# Patient Record
Sex: MALE | Race: WHITE | HISPANIC OR LATINO | Employment: OTHER | URBAN - METROPOLITAN AREA
[De-identification: names, ages, dates, MRNs, and addresses within clinical notes are randomized per-mention and may not be internally consistent; named-entity substitution may affect disease eponyms.]

---

## 2017-01-07 RX ORDER — METFORMIN HYDROCHLORIDE 1000 MG/1
TABLET ORAL
Qty: 180 TABLET | Refills: 2 | Status: SHIPPED | OUTPATIENT
Start: 2017-01-07 | End: 2018-01-16 | Stop reason: SDUPTHER

## 2017-02-01 DIAGNOSIS — Z00.00 ANNUAL PHYSICAL EXAM: ICD-10-CM

## 2017-02-01 DIAGNOSIS — E11.9 TYPE 2 DIABETES MELLITUS WITHOUT COMPLICATION: ICD-10-CM

## 2017-02-01 RX ORDER — LISINOPRIL 2.5 MG/1
TABLET ORAL
Qty: 30 TABLET | Refills: 6 | Status: SHIPPED | OUTPATIENT
Start: 2017-02-01 | End: 2017-10-02 | Stop reason: SDUPTHER

## 2017-06-16 DIAGNOSIS — E11.9 TYPE 2 DIABETES MELLITUS WITHOUT COMPLICATION: ICD-10-CM

## 2017-09-28 ENCOUNTER — TELEPHONE (OUTPATIENT)
Dept: INTERNAL MEDICINE | Facility: CLINIC | Age: 57
End: 2017-09-28

## 2017-09-28 RX ORDER — ATORVASTATIN CALCIUM 10 MG/1
10 TABLET, FILM COATED ORAL NIGHTLY
Qty: 90 TABLET | Refills: 1 | Status: SHIPPED | OUTPATIENT
Start: 2017-09-28 | End: 2018-11-06 | Stop reason: SDUPTHER

## 2017-09-28 RX ORDER — ATORVASTATIN CALCIUM 10 MG/1
10 TABLET, FILM COATED ORAL NIGHTLY
Qty: 30 TABLET | Refills: 1 | Status: SHIPPED | OUTPATIENT
Start: 2017-09-28 | End: 2017-09-28 | Stop reason: SDUPTHER

## 2017-10-02 ENCOUNTER — TELEPHONE (OUTPATIENT)
Dept: INTERNAL MEDICINE | Facility: CLINIC | Age: 57
End: 2017-10-02

## 2017-10-02 DIAGNOSIS — Z00.00 ANNUAL PHYSICAL EXAM: ICD-10-CM

## 2017-10-02 DIAGNOSIS — E11.9 TYPE 2 DIABETES MELLITUS WITHOUT COMPLICATION, UNSPECIFIED LONG TERM INSULIN USE STATUS: ICD-10-CM

## 2017-10-02 RX ORDER — LISINOPRIL 2.5 MG/1
2.5 TABLET ORAL DAILY
Qty: 90 TABLET | Refills: 0 | Status: SHIPPED | OUTPATIENT
Start: 2017-10-02 | End: 2018-01-02 | Stop reason: SDUPTHER

## 2018-01-02 DIAGNOSIS — Z00.00 ANNUAL PHYSICAL EXAM: ICD-10-CM

## 2018-01-02 DIAGNOSIS — E11.9 TYPE 2 DIABETES MELLITUS WITHOUT COMPLICATION, UNSPECIFIED LONG TERM INSULIN USE STATUS: ICD-10-CM

## 2018-01-02 RX ORDER — LISINOPRIL 2.5 MG/1
TABLET ORAL
Qty: 90 TABLET | Refills: 1 | Status: SHIPPED | OUTPATIENT
Start: 2018-01-02 | End: 2018-07-09 | Stop reason: SDUPTHER

## 2018-01-17 RX ORDER — METFORMIN HYDROCHLORIDE 1000 MG/1
TABLET ORAL
Qty: 180 TABLET | Refills: 1 | Status: SHIPPED | OUTPATIENT
Start: 2018-01-17 | End: 2018-07-09 | Stop reason: SDUPTHER

## 2018-01-19 ENCOUNTER — HOSPITAL ENCOUNTER (OUTPATIENT)
Dept: RADIOLOGY | Facility: HOSPITAL | Age: 58
Discharge: HOME | End: 2018-01-19
Attending: INTERNAL MEDICINE
Payer: COMMERCIAL

## 2018-01-19 ENCOUNTER — OFFICE VISIT (OUTPATIENT)
Dept: INTERNAL MEDICINE | Facility: CLINIC | Age: 58
End: 2018-01-19
Payer: COMMERCIAL

## 2018-01-19 VITALS
HEIGHT: 65 IN | SYSTOLIC BLOOD PRESSURE: 129 MMHG | DIASTOLIC BLOOD PRESSURE: 76 MMHG | HEART RATE: 75 BPM | BODY MASS INDEX: 31.19 KG/M2 | WEIGHT: 187.19 LBS | TEMPERATURE: 98 F | RESPIRATION RATE: 18 BRPM

## 2018-01-19 DIAGNOSIS — Z00.00 ANNUAL PHYSICAL EXAM: ICD-10-CM

## 2018-01-19 DIAGNOSIS — M54.12 CERVICAL RADICULOPATHY: ICD-10-CM

## 2018-01-19 DIAGNOSIS — M54.12 CERVICAL RADICULOPATHY: Primary | ICD-10-CM

## 2018-01-19 DIAGNOSIS — R53.83 FATIGUE, UNSPECIFIED TYPE: ICD-10-CM

## 2018-01-19 PROCEDURE — 72040 X-RAY EXAM NECK SPINE 2-3 VW: CPT | Mod: 26,,, | Performed by: RADIOLOGY

## 2018-01-19 PROCEDURE — 72040 X-RAY EXAM NECK SPINE 2-3 VW: CPT | Mod: TC,PO

## 2018-01-19 PROCEDURE — 99999 PR PBB SHADOW E&M-EST. PATIENT-LVL III: CPT | Mod: PBBFAC,,, | Performed by: INTERNAL MEDICINE

## 2018-01-19 PROCEDURE — 99396 PREV VISIT EST AGE 40-64: CPT | Mod: S$GLB,,, | Performed by: INTERNAL MEDICINE

## 2018-01-19 RX ORDER — METHOCARBAMOL 500 MG/1
500 TABLET, FILM COATED ORAL 3 TIMES DAILY PRN
Qty: 30 TABLET | Refills: 0 | Status: SHIPPED | OUTPATIENT
Start: 2018-01-19 | End: 2018-01-29

## 2018-01-19 RX ORDER — LIDOCAINE 50 MG/G
OINTMENT TOPICAL DAILY PRN
Qty: 120 G | Refills: 3 | Status: SHIPPED | OUTPATIENT
Start: 2018-01-19 | End: 2019-09-05

## 2018-01-19 RX ORDER — GLYBURIDE 5 MG/1
TABLET ORAL
COMMUNITY
Start: 2017-12-27 | End: 2019-02-19 | Stop reason: SDUPTHER

## 2018-01-19 RX ORDER — NAPROXEN 500 MG/1
500 TABLET ORAL 2 TIMES DAILY
Qty: 60 TABLET | Refills: 1 | Status: SHIPPED | OUTPATIENT
Start: 2018-01-19 | End: 2018-04-04 | Stop reason: SDUPTHER

## 2018-01-19 NOTE — PROGRESS NOTES
Subjective:       Patient ID: Darrell Hinds Jr. is a 57 y.o. male.    Chief Complaint: Neck Pain (below ear & down to shoulder); Shortness of Breath; and Shoulder Pain (left)    HPI     57-year-old male here for evaluation of neck pain below the ear into the shoulder, shortness of breath, and left shoulder pain.  He reports that for two weeks, he has a pain behind his left ear that goes down the left side of his neck into his upper back.  He felt some numbness of his face and tingling of his left fingers.  He had eaten and felt dizzy the other day.  He felt kind of weak and nauseous.  He feels better this am.  He has had his wife rubbing the pain.  He has tried ibuprofen numbing cream.  It feels like a sharp pain.  The pain is intermittent.  Better after massage.  Better in the am.  He does not feel it when he goes to sleep.  He has had more stress than usual.      Review of Systems    Objective:      Physical Exam   Constitutional: He is oriented to person, place, and time. He appears well-developed and well-nourished.   HENT:   Head: Normocephalic and atraumatic.   Right Ear: Tympanic membrane and ear canal normal.   Left Ear: Tympanic membrane and ear canal normal.   Mouth/Throat: No oropharyngeal exudate.   Eyes: EOM are normal. Pupils are equal, round, and reactive to light. Right eye exhibits no discharge. Left eye exhibits no discharge. No scleral icterus.   Neck: Normal range of motion. Neck supple. No tracheal deviation present. No thyromegaly present.   Cardiovascular: Normal rate, regular rhythm and normal heart sounds.  Exam reveals no gallop and no friction rub.    No murmur heard.  Pulmonary/Chest: Effort normal and breath sounds normal. No respiratory distress. He has no wheezes. He has no rales. He exhibits no tenderness.   Abdominal: Soft. Bowel sounds are normal. He exhibits no distension and no mass. There is no tenderness. There is no rebound and no guarding.   Musculoskeletal: Normal range of  motion. He exhibits no edema or tenderness.   Neurological: He is alert and oriented to person, place, and time.   Skin: Skin is warm and dry. No rash noted. No erythema. No pallor.   Psychiatric: He has a normal mood and affect. His behavior is normal.   Vitals reviewed.      Assessment:       1. Cervical radiculopathy    2. Annual physical exam    3. Fatigue, unspecified type        Plan:       1.  Cervical spine x-ray.  Naproxen 500 mg twice a day.  Lidocaine gel topically remove after 12 hours.  Robaxin 3 times a day as needed.  Advised this can make him drowsy.    2.  Annual - advised to come back for annual exam at his convenience.  Check CBC, CMP, TSH, lipids, A1c, PSA, testosterone.  Urine microalbumin.  3.  Testosterone

## 2018-01-25 ENCOUNTER — TELEPHONE (OUTPATIENT)
Dept: INTERNAL MEDICINE | Facility: CLINIC | Age: 58
End: 2018-01-25

## 2018-01-25 NOTE — TELEPHONE ENCOUNTER
----- Message from Teresa Kulkarni sent at 1/19/2018  4:19 PM CST -----  Contact: Self  Doctor appointment and lab have been scheduled.  Please link lab orders to the lab appointment.  Date of doctor appointment:  02/28  Physical or EP:  physical  Date of lab appointment:  02/21  Comments:

## 2018-04-05 RX ORDER — NAPROXEN 500 MG/1
TABLET ORAL
Qty: 60 TABLET | Refills: 1 | Status: SHIPPED | OUTPATIENT
Start: 2018-04-05 | End: 2018-06-26 | Stop reason: SDUPTHER

## 2018-06-26 RX ORDER — NAPROXEN 500 MG/1
TABLET ORAL
Qty: 60 TABLET | Refills: 1 | Status: SHIPPED | OUTPATIENT
Start: 2018-06-26 | End: 2018-08-19 | Stop reason: SDUPTHER

## 2018-07-09 DIAGNOSIS — E11.9 TYPE 2 DIABETES MELLITUS WITHOUT COMPLICATION: ICD-10-CM

## 2018-07-09 DIAGNOSIS — Z00.00 ANNUAL PHYSICAL EXAM: ICD-10-CM

## 2018-07-09 RX ORDER — LISINOPRIL 2.5 MG/1
TABLET ORAL
Qty: 90 TABLET | Refills: 1 | Status: SHIPPED | OUTPATIENT
Start: 2018-07-09 | End: 2019-01-08 | Stop reason: SDUPTHER

## 2018-07-09 RX ORDER — METFORMIN HYDROCHLORIDE 1000 MG/1
TABLET ORAL
Qty: 180 TABLET | Refills: 1 | Status: SHIPPED | OUTPATIENT
Start: 2018-07-09 | End: 2019-01-08 | Stop reason: SDUPTHER

## 2018-08-20 RX ORDER — NAPROXEN 500 MG/1
TABLET ORAL
Qty: 60 TABLET | Refills: 1 | Status: SHIPPED | OUTPATIENT
Start: 2018-08-20 | End: 2019-09-05

## 2018-09-10 ENCOUNTER — TELEPHONE (OUTPATIENT)
Dept: INTERNAL MEDICINE | Facility: CLINIC | Age: 58
End: 2018-09-10

## 2018-09-10 DIAGNOSIS — Z00.00 ANNUAL PHYSICAL EXAM: Primary | ICD-10-CM

## 2018-09-10 NOTE — TELEPHONE ENCOUNTER
----- Message from Soheila Figueroa sent at 9/10/2018  2:58 PM CDT -----  Doctor appointment and lab have been scheduled.  Please link lab orders to the lab appointment.  Date of doctor appointment:  12/07/18  Physical or EP:  physical  Date of lab appointment:  12/03/17  Comments:

## 2018-11-06 RX ORDER — ATORVASTATIN CALCIUM 10 MG/1
10 TABLET, FILM COATED ORAL NIGHTLY
Qty: 90 TABLET | Refills: 1 | Status: SHIPPED | OUTPATIENT
Start: 2018-11-06 | End: 2019-05-02 | Stop reason: SDUPTHER

## 2018-11-23 NOTE — PROGRESS NOTES
Subjective:       Patient ID: Darrell Hinds Jr. is a 58 y.o. male.    Chief Complaint: Annual Exam    Patient is a 58 y.o.male who presents today for annual    Cholesterol: reviewed  Vaccines: Influenza (yearly); Tetanus (2015) ; Pneumovax (done); Zoster (done)  Eye exam: due now  Colonoscopy: 2015; due in five years    Stopped propecia 2-3 weeks ago.    Diabetes - Patient is on metformin and glyburide. he does not check his BG at home His last eye exam was over one year ago.  He does check their feet on a regular basis.  He does not  have numbness/tingling of his feet.  His last A1c's were   Hemoglobin A1C   Date Value Ref Range Status   12/03/2018 10.6 (H) 4.0 - 5.6 % Final     Comment:     ADA Screening Guidelines:  5.7-6.4%  Consistent with prediabetes  >or=6.5%  Consistent with diabetes  High levels of fetal hemoglobin interfere with the HbA1C  assay. Heterozygous hemoglobin variants (HbS, HgC, etc)do  not significantly interfere with this assay.   However, presence of multiple variants may affect accuracy.     12/05/2016 10.5 (H) 4.5 - 6.2 % Final     Comment:     According to ADA guidelines, hemoglobin A1C <7.0% represents  optimal control in non-pregnant diabetic patients.  Different  metrics may apply to specific populations.   Standards of Medical Care in Diabetes - 2016.  For the purpose of screening for the presence of diabetes:  <5.7%     Consistent with the absence of diabetes  5.7-6.4%  Consistent with increasing risk for diabetes   (prediabetes)  >or=6.5%  Consistent with diabetes  Currently no consensus exists for use of hemoglobin A1C  for diagnosis of diabetes for children.     09/01/2016 11.3 (H) 4.5 - 6.2 % Final     Comment:     According to ADA guidelines, hemoglobin A1C <7.0% represents  optimal control in non-pregnant diabetic patients.  Different  metrics may apply to specific populations.   Standards of Medical Care in Diabetes - 2016.  For the purpose of screening for the presence of  diabetes:  <5.7%     Consistent with the absence of diabetes  5.7-6.4%  Consistent with increasing risk for diabetes   (prediabetes)  >or=6.5%  Consistent with diabetes  Currently no consensus exists for use of hemoglobin A1C  for diagnosis of diabetes for children.     .    Review of Systems   Constitutional: Negative for appetite change, chills, diaphoresis, fatigue and fever.   HENT: Negative for congestion, dental problem, ear discharge, ear pain, hearing loss, postnasal drip, sinus pressure and sore throat.    Eyes: Negative for discharge, redness and itching.   Respiratory: Negative for chest tightness, shortness of breath and wheezing.    Cardiovascular: Negative for chest pain, palpitations and leg swelling.   Gastrointestinal: Negative for abdominal pain, constipation, diarrhea, nausea and vomiting.   Endocrine: Negative for cold intolerance and heat intolerance.   Genitourinary: Negative for difficulty urinating, frequency, hematuria and urgency.   Musculoskeletal: Negative for arthralgias, back pain, gait problem, myalgias and neck pain.   Skin: Negative for color change and rash.   Allergic/Immunologic: Negative for environmental allergies.   Neurological: Negative for dizziness, syncope and headaches.   Hematological: Negative for adenopathy.   Psychiatric/Behavioral: Negative for behavioral problems and sleep disturbance. The patient is not nervous/anxious.        Objective:      Physical Exam   Constitutional: He is oriented to person, place, and time. He appears well-developed and well-nourished. No distress.   HENT:   Head: Normocephalic and atraumatic.   Right Ear: External ear normal.   Left Ear: External ear normal.   Nose: Nose normal.   Mouth/Throat: Oropharynx is clear and moist. No oropharyngeal exudate.   Eyes: Conjunctivae and EOM are normal. Pupils are equal, round, and reactive to light. Right eye exhibits no discharge. Left eye exhibits no discharge. No scleral icterus.   Neck: Normal  range of motion. Neck supple. No JVD present. No tracheal deviation present. No thyromegaly present.   Cardiovascular: Normal rate, regular rhythm, normal heart sounds and intact distal pulses. Exam reveals no gallop and no friction rub.   No murmur heard.  Pulses:       Dorsalis pedis pulses are 2+ on the right side, and 2+ on the left side.        Posterior tibial pulses are 2+ on the right side, and 2+ on the left side.   Pulmonary/Chest: Effort normal and breath sounds normal. No stridor. No respiratory distress. He has no wheezes. He has no rales. He exhibits no tenderness.   Abdominal: Soft. Bowel sounds are normal. He exhibits no distension. There is no tenderness. There is no rebound.   Musculoskeletal: Normal range of motion. He exhibits no edema or tenderness.        Right foot: There is normal range of motion and no deformity.        Left foot: There is normal range of motion and no deformity.   Feet:   Right Foot:   Protective Sensation: 3 sites tested. 3 sites sensed.   Skin Integrity: Negative for ulcer, blister, skin breakdown, erythema, warmth, callus or dry skin.   Left Foot:   Protective Sensation: 3 sites tested. 3 sites sensed.   Skin Integrity: Negative for ulcer, blister, skin breakdown, erythema, warmth, callus or dry skin.   Lymphadenopathy:     He has no cervical adenopathy.   Neurological: He is alert and oriented to person, place, and time. He has normal reflexes. No cranial nerve deficit.   Skin: Skin is warm and dry. No rash noted. He is not diaphoretic. No erythema.   Psychiatric: He has a normal mood and affect. His behavior is normal.   Nursing note and vitals reviewed.      Assessment and Plan:       1. Annual physical exam  - labs reviewed  - varicella-zoster gE-AS01B, PF, (SHINGRIX, PF,) 50 mcg/0.5 mL injection; Inject 0.5 mLs into the muscle once. for 1 dose  Dispense: 0.5 mL; Refill: 1  - Pneumococcal Polysaccharide Vaccine (23 Valent) (SQ/IM)    2. Type 2 diabetes mellitus with  hyperglycemia, without long-term current use of insulin  - on max of metformin and glyburide; add januvia; declines injectables  - varicella-zoster gE-AS01B, PF, (SHINGRIX, PF,) 50 mcg/0.5 mL injection; Inject 0.5 mLs into the muscle once. for 1 dose  Dispense: 0.5 mL; Refill: 1  - Pneumococcal Polysaccharide Vaccine (23 Valent) (SQ/IM)  - Ambulatory Referral to Optometry    3. Other hyperlipidemia  - statin causing muscle pain; will try zetia  - varicella-zoster gE-AS01B, PF, (SHINGRIX, PF,) 50 mcg/0.5 mL injection; Inject 0.5 mLs into the muscle once. for 1 dose  Dispense: 0.5 mL; Refill: 1  - Pneumococcal Polysaccharide Vaccine (23 Valent) (SQ/IM)    4. Essential hypertension  - stable  - varicella-zoster gE-AS01B, PF, (SHINGRIX, PF,) 50 mcg/0.5 mL injection; Inject 0.5 mLs into the muscle once. for 1 dose  Dispense: 0.5 mL; Refill: 1  - Pneumococcal Polysaccharide Vaccine (23 Valent) (SQ/IM)    5. Vitamin D deficiency  - start otc  - varicella-zoster gE-AS01B, PF, (SHINGRIX, PF,) 50 mcg/0.5 mL injection; Inject 0.5 mLs into the muscle once. for 1 dose  Dispense: 0.5 mL; Refill: 1  - Pneumococcal Polysaccharide Vaccine (23 Valent) (SQ/IM)    6. Testosterone deficiency    - Ambulatory Referral to Urology  -        No Follow-up on file.

## 2018-12-02 ENCOUNTER — PATIENT MESSAGE (OUTPATIENT)
Dept: INTERNAL MEDICINE | Facility: CLINIC | Age: 58
End: 2018-12-02

## 2018-12-02 DIAGNOSIS — Z00.00 ANNUAL PHYSICAL EXAM: Primary | ICD-10-CM

## 2018-12-03 ENCOUNTER — LAB VISIT (OUTPATIENT)
Dept: LAB | Facility: HOSPITAL | Age: 58
End: 2018-12-03
Attending: INTERNAL MEDICINE
Payer: COMMERCIAL

## 2018-12-03 DIAGNOSIS — Z00.00 ANNUAL PHYSICAL EXAM: ICD-10-CM

## 2018-12-03 LAB
25(OH)D3+25(OH)D2 SERPL-MCNC: 24 NG/ML
ALBUMIN SERPL BCP-MCNC: 4.1 G/DL
ALP SERPL-CCNC: 67 U/L
ALT SERPL W/O P-5'-P-CCNC: 23 U/L
ANION GAP SERPL CALC-SCNC: 5 MMOL/L
AST SERPL-CCNC: 18 U/L
BASOPHILS # BLD AUTO: 0.02 K/UL
BASOPHILS NFR BLD: 0.4 %
BILIRUB SERPL-MCNC: 0.8 MG/DL
BUN SERPL-MCNC: 15 MG/DL
CALCIUM SERPL-MCNC: 9.3 MG/DL
CHLORIDE SERPL-SCNC: 105 MMOL/L
CHOLEST SERPL-MCNC: 158 MG/DL
CHOLEST/HDLC SERPL: 4.1 {RATIO}
CO2 SERPL-SCNC: 27 MMOL/L
COMPLEXED PSA SERPL-MCNC: 0.49 NG/ML
CREAT SERPL-MCNC: 0.9 MG/DL
DIFFERENTIAL METHOD: ABNORMAL
EOSINOPHIL # BLD AUTO: 0.1 K/UL
EOSINOPHIL NFR BLD: 1.6 %
ERYTHROCYTE [DISTWIDTH] IN BLOOD BY AUTOMATED COUNT: 12.8 %
EST. GFR  (AFRICAN AMERICAN): >60 ML/MIN/1.73 M^2
EST. GFR  (NON AFRICAN AMERICAN): >60 ML/MIN/1.73 M^2
ESTIMATED AVG GLUCOSE: 258 MG/DL
GLUCOSE SERPL-MCNC: 234 MG/DL
HBA1C MFR BLD HPLC: 10.6 %
HCT VFR BLD AUTO: 44.2 %
HDLC SERPL-MCNC: 39 MG/DL
HDLC SERPL: 24.7 %
HGB BLD-MCNC: 15.2 G/DL
IMM GRANULOCYTES # BLD AUTO: 0.03 K/UL
IMM GRANULOCYTES NFR BLD AUTO: 0.6 %
LDLC SERPL CALC-MCNC: 97.6 MG/DL
LYMPHOCYTES # BLD AUTO: 1.4 K/UL
LYMPHOCYTES NFR BLD: 28.7 %
MCH RBC QN AUTO: 31 PG
MCHC RBC AUTO-ENTMCNC: 34.4 G/DL
MCV RBC AUTO: 90 FL
MONOCYTES # BLD AUTO: 0.4 K/UL
MONOCYTES NFR BLD: 8 %
NEUTROPHILS # BLD AUTO: 2.9 K/UL
NEUTROPHILS NFR BLD: 60.7 %
NONHDLC SERPL-MCNC: 119 MG/DL
NRBC BLD-RTO: 0 /100 WBC
PLATELET # BLD AUTO: 196 K/UL
PMV BLD AUTO: 10.9 FL
POTASSIUM SERPL-SCNC: 4.8 MMOL/L
PROT SERPL-MCNC: 6.9 G/DL
RBC # BLD AUTO: 4.9 M/UL
SODIUM SERPL-SCNC: 137 MMOL/L
TESTOST SERPL-MCNC: 455 NG/DL
TRIGL SERPL-MCNC: 107 MG/DL
TSH SERPL DL<=0.005 MIU/L-ACNC: 1.07 UIU/ML
WBC # BLD AUTO: 4.85 K/UL

## 2018-12-03 PROCEDURE — 82306 VITAMIN D 25 HYDROXY: CPT

## 2018-12-03 PROCEDURE — 84153 ASSAY OF PSA TOTAL: CPT

## 2018-12-03 PROCEDURE — 80061 LIPID PANEL: CPT

## 2018-12-03 PROCEDURE — 83036 HEMOGLOBIN GLYCOSYLATED A1C: CPT

## 2018-12-03 PROCEDURE — 85025 COMPLETE CBC W/AUTO DIFF WBC: CPT

## 2018-12-03 PROCEDURE — 84443 ASSAY THYROID STIM HORMONE: CPT

## 2018-12-03 PROCEDURE — 36415 COLL VENOUS BLD VENIPUNCTURE: CPT | Mod: PO

## 2018-12-03 PROCEDURE — 84403 ASSAY OF TOTAL TESTOSTERONE: CPT

## 2018-12-03 PROCEDURE — 80053 COMPREHEN METABOLIC PANEL: CPT

## 2018-12-05 PROBLEM — I10 ESSENTIAL HYPERTENSION: Status: ACTIVE | Noted: 2018-12-05

## 2018-12-05 PROBLEM — E78.49 OTHER HYPERLIPIDEMIA: Status: ACTIVE | Noted: 2018-12-05

## 2018-12-07 ENCOUNTER — OFFICE VISIT (OUTPATIENT)
Dept: INTERNAL MEDICINE | Facility: CLINIC | Age: 58
End: 2018-12-07
Payer: COMMERCIAL

## 2018-12-07 VITALS
TEMPERATURE: 98 F | SYSTOLIC BLOOD PRESSURE: 132 MMHG | HEART RATE: 64 BPM | BODY MASS INDEX: 31.18 KG/M2 | RESPIRATION RATE: 14 BRPM | WEIGHT: 187.38 LBS | DIASTOLIC BLOOD PRESSURE: 80 MMHG

## 2018-12-07 DIAGNOSIS — E78.49 OTHER HYPERLIPIDEMIA: ICD-10-CM

## 2018-12-07 DIAGNOSIS — E34.9 TESTOSTERONE DEFICIENCY: ICD-10-CM

## 2018-12-07 DIAGNOSIS — Z00.00 ANNUAL PHYSICAL EXAM: Primary | ICD-10-CM

## 2018-12-07 DIAGNOSIS — E55.9 VITAMIN D DEFICIENCY: ICD-10-CM

## 2018-12-07 DIAGNOSIS — E11.65 TYPE 2 DIABETES MELLITUS WITH HYPERGLYCEMIA, WITHOUT LONG-TERM CURRENT USE OF INSULIN: Chronic | ICD-10-CM

## 2018-12-07 DIAGNOSIS — I10 ESSENTIAL HYPERTENSION: ICD-10-CM

## 2018-12-07 PROCEDURE — 90732 PPSV23 VACC 2 YRS+ SUBQ/IM: CPT | Mod: S$GLB,,, | Performed by: INTERNAL MEDICINE

## 2018-12-07 PROCEDURE — 3046F HEMOGLOBIN A1C LEVEL >9.0%: CPT | Mod: CPTII,S$GLB,, | Performed by: INTERNAL MEDICINE

## 2018-12-07 PROCEDURE — 90471 IMMUNIZATION ADMIN: CPT | Mod: S$GLB,,, | Performed by: INTERNAL MEDICINE

## 2018-12-07 PROCEDURE — 99999 PR PBB SHADOW E&M-EST. PATIENT-LVL III: CPT | Mod: PBBFAC,,, | Performed by: INTERNAL MEDICINE

## 2018-12-07 PROCEDURE — 3079F DIAST BP 80-89 MM HG: CPT | Mod: CPTII,S$GLB,, | Performed by: INTERNAL MEDICINE

## 2018-12-07 PROCEDURE — 99396 PREV VISIT EST AGE 40-64: CPT | Mod: 25,S$GLB,, | Performed by: INTERNAL MEDICINE

## 2018-12-07 PROCEDURE — 3075F SYST BP GE 130 - 139MM HG: CPT | Mod: CPTII,S$GLB,, | Performed by: INTERNAL MEDICINE

## 2018-12-07 RX ORDER — EZETIMIBE 10 MG/1
10 TABLET ORAL DAILY
Qty: 30 TABLET | Refills: 6 | Status: SHIPPED | OUTPATIENT
Start: 2018-12-07 | End: 2019-08-12 | Stop reason: SDUPTHER

## 2018-12-10 ENCOUNTER — OFFICE VISIT (OUTPATIENT)
Dept: UROLOGY | Facility: CLINIC | Age: 58
End: 2018-12-10
Payer: COMMERCIAL

## 2018-12-10 VITALS
HEIGHT: 65 IN | SYSTOLIC BLOOD PRESSURE: 166 MMHG | HEART RATE: 69 BPM | WEIGHT: 187 LBS | TEMPERATURE: 98 F | DIASTOLIC BLOOD PRESSURE: 89 MMHG | BODY MASS INDEX: 31.16 KG/M2

## 2018-12-10 DIAGNOSIS — I10 ESSENTIAL HYPERTENSION: ICD-10-CM

## 2018-12-10 DIAGNOSIS — E11.65 TYPE 2 DIABETES MELLITUS WITH HYPERGLYCEMIA, WITHOUT LONG-TERM CURRENT USE OF INSULIN: ICD-10-CM

## 2018-12-10 DIAGNOSIS — N52.8 OTHER MALE ERECTILE DYSFUNCTION: Primary | ICD-10-CM

## 2018-12-10 DIAGNOSIS — N20.0 NEPHROLITHIASIS: ICD-10-CM

## 2018-12-10 LAB
BILIRUB SERPL-MCNC: ABNORMAL MG/DL
BLOOD URINE, POC: ABNORMAL
COLOR, POC UA: YELLOW
GLUCOSE UR QL STRIP: 250
KETONES UR QL STRIP: ABNORMAL
LEUKOCYTE ESTERASE URINE, POC: ABNORMAL
NITRITE, POC UA: ABNORMAL
PH, POC UA: 5
PROTEIN, POC: ABNORMAL
SPECIFIC GRAVITY, POC UA: 1.02
UROBILINOGEN, POC UA: ABNORMAL

## 2018-12-10 PROCEDURE — 99999 PR PBB SHADOW E&M-EST. PATIENT-LVL III: CPT | Mod: PBBFAC,,, | Performed by: UROLOGY

## 2018-12-10 PROCEDURE — 3046F HEMOGLOBIN A1C LEVEL >9.0%: CPT | Mod: CPTII,S$GLB,, | Performed by: UROLOGY

## 2018-12-10 PROCEDURE — 3079F DIAST BP 80-89 MM HG: CPT | Mod: CPTII,S$GLB,, | Performed by: UROLOGY

## 2018-12-10 PROCEDURE — 99204 OFFICE O/P NEW MOD 45 MIN: CPT | Mod: 25,S$GLB,, | Performed by: UROLOGY

## 2018-12-10 PROCEDURE — 3008F BODY MASS INDEX DOCD: CPT | Mod: CPTII,S$GLB,, | Performed by: UROLOGY

## 2018-12-10 PROCEDURE — 81002 URINALYSIS NONAUTO W/O SCOPE: CPT | Mod: S$GLB,,, | Performed by: UROLOGY

## 2018-12-10 PROCEDURE — 3077F SYST BP >= 140 MM HG: CPT | Mod: CPTII,S$GLB,, | Performed by: UROLOGY

## 2018-12-10 RX ORDER — SILDENAFIL CITRATE 20 MG/1
TABLET ORAL
Qty: 20 TABLET | Refills: 11 | Status: SHIPPED | OUTPATIENT
Start: 2018-12-10 | End: 2020-01-20

## 2018-12-10 NOTE — LETTER
December 10, 2018      Elissa Navarro,   [address] [facility] [place] [address]           [facility]  [address] [facility] [address]  Phone: [phone]          Patient: Darrell Hinds Jr.   MR Number: [id number]   YOB: 1960   Date of Visit: 12/10/2018       Dear Dr. Elissa Navarro:    Thank you for referring Darrell Hinds to me for evaluation. Attached you will find relevant portions of my assessment and plan of care.    If you have questions, please do not hesitate to call me. I look forward to following Darrell Hinds along with you.    Sincerely,    Samanta Fine MA    Enclosure  CC:  No Recipients    If you would like to receive this communication electronically, please contact [email or web address] or [phone] to request more information on [employer] Link access.    For providers and/or their staff who would like to refer a patient to [facility], please contact us through our one-stop-shop provider referral line, [facility], at [phone].    If you feel you have received this communication in error or would no longer like to receive these types of communications, please e-mail [email or web address]

## 2018-12-10 NOTE — PROGRESS NOTES
HPI:  Darrell Hinds Jr. is a 58 y.o. year old male that  presents with   Chief Complaint   Patient presents with    Low Testosterone   .  This patient referred with concerns about his testosterone.    Patient's chief complaint is rectal dysfunction that has been going on for year.    He states that previously he had tried Viagra but I gave him a headache.  He also has tried Cialis and he cannot remember if it worked.    Patient recent serum testosterone which came back normal at 455.    Patient has baseline somewhat decreased force of stream with occasional nocturia when he drinks lots of fluids.  He has no dysuria and no straining to void    There is no family history of kidney bladder prostate cancer the patient has never had urologic surgery    Chart review shows progress no from PCP from this month being a yearly exam.  No made of diabetes and hypertension.  This month PSA was 0.4 and GFR greater than 60.  In December 2012 patient stone protocol CT showing a small distal left ureteral stone which patient states he spontaneously passed.  Note also made of punctate right upper pole stone.  Images reviewed by me in agree with these findings.  Patient reports no interval problems with stones      Past Medical History:   Diagnosis Date    Diabetes mellitus, type 2     Encounter for cosmetic surgery     GERD (gastroesophageal reflux disease)     Hyperlipidemia     Hypertension     Hypertrophy of nasal turbinates     Kidney stone 2009    Nasal obstruction     Nasal septal deformity     Unspecified disorder of kidney and ureter      Social History     Socioeconomic History    Marital status:      Spouse name: Not on file    Number of children: Not on file    Years of education: Not on file    Highest education level: Not on file   Social Needs    Financial resource strain: Not on file    Food insecurity - worry: Not on file    Food insecurity - inability: Not on file    Transportation needs  - medical: Not on file    Transportation needs - non-medical: Not on file   Occupational History    Occupation: contractor   Tobacco Use    Smoking status: Former Smoker   Substance and Sexual Activity    Alcohol use: Yes     Comment: seldom..maybe once a month    Drug use: No    Sexual activity: Yes     Partners: Female   Other Topics Concern    Not on file   Social History Narrative    Not on file     Past Surgical History:   Procedure Laterality Date    COLONOSCOPY N/A 7/7/2015    Performed by Paddy Lopez MD at [facility] ENDO (4TH FLR)    COSMETIC SURGERY      Rhinoplasty    EXCISION TURBINATE, SUBMUCOUS      HERNIA REPAIR      Umbilical    NASAL SEPTUM SURGERY      REPAIR-HERNIA-UMBILICAL, with ventralex mesh N/A 3/4/2015    [facility]    RESECTION-TURBINATES (SMR) N/A 5/21/2015    Performed by Ranjith Connor III, MD at [facility] OR 2ND FLR    RHINOPLASTY - COMSETIC! NO INSURANCE APPROVAL REQUIRED! N/A 5/21/2015    Performed by Ranjith Connor III, MD at [facility] OR 2ND FLR    SEPTOPLASTY N/A 5/21/2015    Performed by Ranjith Connor III, MD at [facility] OR 2ND FLR     Family History   Problem Relation Age of Onset    Hypertension Mother     Cataracts Mother     Heart disease Father     Diabetes Father     Cataracts Father     Diabetes Brother     Hypertension Brother     Cancer Daughter         leukemia at 11yo    Kidney disease Maternal Grandmother     Heart disease Paternal Grandmother     Heart disease Paternal Grandfather     No Known Problems Maternal Grandfather     No Known Problems Sister     No Known Problems Maternal Aunt     No Known Problems Maternal Uncle     No Known Problems Paternal Aunt     No Known Problems Paternal Uncle     Blindness Neg Hx     Glaucoma Neg Hx     Macular degeneration Neg Hx     Retinal detachment Neg Hx     Acne Neg Hx     Eczema Neg Hx     Lupus Neg Hx     Psoriasis Neg Hx     Melanoma Neg Hx     Anesthesia  "problems Neg Hx     Amblyopia Neg Hx     Strabismus Neg Hx     Stroke Neg Hx     Thyroid disease Neg Hx            Review of Systems  The patient has no chest pains.  The patient has no shortness of breath  Patient wears        glasses.  All other review of systems are negative.      Physical Exam:  BP (!) 166/89 (BP Location: Left arm, Patient Position: Sitting, BP Method: Large (Automatic))   Pulse 69   Temp 98 °F (36.7 °C) (Oral)   Ht 5' 5" (1.651 m)   Wt 84.8 kg (187 lb)   BMI 31.12 kg/m²   General appearance: alert, cooperative, no distress  Constitutional:Oriented to person, place, and time.appears well-developed and well-nourished.   HEENT: Normocephalic, atraumatic, neck symmetrical, no nasal discharge   Eyes: conjunctivae/corneas clear, PERRL, EOM's intact  Lungs: clear to auscultation bilaterally, no dullness to percussion bilaterally  Heart: regular rate and rhythm without rub; no displacement of the PMI   Abdomen: soft, non-tender; bowel sounds normoactive; no organomegaly  :  Penis/perineum without lesions, scrotum without rash/cysts, epididymis nontender bilaterally, urethral meatus in normal location normal size, no penile plaques palpated, prostate:     Smooth minimally enlarged and benign-feeling                  seminal vesicles not palpated.  No rectal masses, sphincter tone normal.  Testes equal in size without masses  Extremities: extremities symmetric; no clubbing, cyanosis, or edema  Integument: Skin color, texture, turgor normal; no rashes; hair distrubution normal  Neurologic: Alert and oriented X 3, normal strength, normal coordination and gait  Psychiatric: no pressured speech; normal affect; no evidence of impaired cognition     LABS:    Complete Blood Count  Lab Results   Component Value Date    RBC 4.90 12/03/2018    HGB 15.2 12/03/2018    HCT 44.2 12/03/2018    MCV 90 12/03/2018    MCH 31.0 12/03/2018    MCHC 34.4 12/03/2018    RDW 12.8 12/03/2018     12/03/2018    " MPV 10.9 12/03/2018    GRAN 2.9 12/03/2018    GRAN 60.7 12/03/2018    LYMPH 1.4 12/03/2018    LYMPH 28.7 12/03/2018    MONO 0.4 12/03/2018    MONO 8.0 12/03/2018    EOS 0.1 12/03/2018    BASO 0.02 12/03/2018    EOSINOPHIL 1.6 12/03/2018    BASOPHIL 0.4 12/03/2018    DIFFMETHOD Automated 12/03/2018       Comprehensive Metabolic Panel  Lab Results   Component Value Date     (H) 12/03/2018    BUN 15 12/03/2018    CREATININE 0.9 12/03/2018     12/03/2018    K 4.8 12/03/2018     12/03/2018    PROT 6.9 12/03/2018    ALBUMIN 4.1 12/03/2018    BILITOT 0.8 12/03/2018    AST 18 12/03/2018    ALKPHOS 67 12/03/2018    CO2 27 12/03/2018    ALT 23 12/03/2018    ANIONGAP 5 (L) 12/03/2018    EGFRNONAA >60.0 12/03/2018    ESTGFRAFRICA >60.0 12/03/2018       PSA  Lab Results   Component Value Date    PSA 0.49 12/03/2018         Assessment:    ICD-10-CM ICD-9-CM    1. Other male erectile dysfunction N52.8 607.84 POCT URINE DIPSTICK WITHOUT MICROSCOPE   2. Nephrolithiasis N20.0 592.0    3. Essential hypertension I10 401.9    4. Type 2 diabetes mellitus with hyperglycemia, without long-term current use of insulin E11.65 250.00      790.29      The primary encounter diagnosis was Other male erectile dysfunction. Diagnoses of Nephrolithiasis, Essential hypertension, and Type 2 diabetes mellitus with hyperglycemia, without long-term current use of insulin were also pertinent to this visit.      Plan:  1.  Erectile dysfunction.  Plan.  I reassured patient that his testosterone level is within normal limits and that current AUA guidelines do not recommend testosterone replacement in men with normal testosterone levels.  I discussed options of management of erectile dysfunction including retrial of oral agents, penile injection, intraurethral pellets, external vacuum device, and penile implantation.  Patient wants to give generic Viagra I retry.  Prescription therefore written for sildenafil 20 mg.    The patient instructed  to start with 1 by mouth 1 hour before sex and increase in a stepwise fashion to maximum 5 by mouth 1 hour before sex.   Risks  of sildenafil including headache, upset stomach, change in the way that colors look, and prolonged erection discussed with the patient.  The need to go to emergency room if he has an erection lasting more than 4 hours discussed.  If the patient is on an alpha-blocker, the need for  a 4 hour time difference between taking the sildenafil and the alpha-blocker discussed.  Patient voices understanding.  Prescription for sildenafil 20 mg given.    2.  Nephrolithiasis, history of.  No current symptoms and no further testing needed    3.   Elevated blood pressure.  Plan.  This finding pointed out to the patient.  I recommend that the patient follow up with the patient's PCP for this.    4.  Diabetes.  I discussed with the patient that his erectile dysfunction related to his diabetes and hypertension    This point follow up with me on an as-needed basis    Orders Placed This Encounter   Procedures    POCT URINE DIPSTICK WITHOUT MICROSCOPE           Benji Griffith MD    PLEASE NOTE:  Please be advised that portions of this note were dictated using voice recognition software and may contain dictation related errors in spelling/grammar/appropriate pronouns/syntax or other errors that might have not been found and or corrected on text review.

## 2018-12-18 ENCOUNTER — TELEPHONE (OUTPATIENT)
Dept: INTERNAL MEDICINE | Facility: CLINIC | Age: 58
End: 2018-12-18

## 2018-12-18 NOTE — TELEPHONE ENCOUNTER
Spoke to pt's friend who stated pt is taking all meds and doesn't feel well. Advised that pt isn't to take lipitor per o/v and start zetia. Pt was also to to add januvia to diabetic medication. Pt was not supposed to start both medications at once, one this week and one next week. She verbalized understanding.

## 2018-12-18 NOTE — TELEPHONE ENCOUNTER
----- Message from Otilia Ohara sent at 12/18/2018  1:57 PM CST -----  Contact: Cristy (Friend) [phone]  Cristy would like a call back with Darrell current medication list. Cristy states Darrell was given new medication but he is still feeling sick and they are think it could be cause from Darrell over medicating himself form not discontinuing his old medications . Please call to advise.

## 2019-01-08 DIAGNOSIS — Z00.00 ANNUAL PHYSICAL EXAM: ICD-10-CM

## 2019-01-08 DIAGNOSIS — E11.9 TYPE 2 DIABETES MELLITUS WITHOUT COMPLICATION: ICD-10-CM

## 2019-01-08 RX ORDER — LISINOPRIL 2.5 MG/1
TABLET ORAL
Qty: 90 TABLET | Refills: 1 | Status: SHIPPED | OUTPATIENT
Start: 2019-01-08 | End: 2019-07-07 | Stop reason: SDUPTHER

## 2019-01-08 RX ORDER — METFORMIN HYDROCHLORIDE 1000 MG/1
TABLET ORAL
Qty: 180 TABLET | Refills: 1 | Status: SHIPPED | OUTPATIENT
Start: 2019-01-08 | End: 2019-07-07 | Stop reason: SDUPTHER

## 2019-02-19 RX ORDER — GLYBURIDE 5 MG/1
10 TABLET ORAL 2 TIMES DAILY WITH MEALS
Qty: 360 TABLET | Refills: 1 | Status: SHIPPED | OUTPATIENT
Start: 2019-02-19 | End: 2019-09-02 | Stop reason: SDUPTHER

## 2019-02-19 NOTE — TELEPHONE ENCOUNTER
"----- Message from Claudia Kulkarni sent at 2/19/2019  4:48 PM CST -----  Contact: PT randi Muniz [phone]  RX request - refill or new RX.  Is this a refill or new RX:  New  RX name and strength: glyBURIDE (DIABETA) 5 MG tablet  Directions:   Is this a 30 day or 90 day RX:    Local pharmacy or mail order pharmacy:  Local  Pharmacy name and phone # (DON'T enter "on file" or "in chart"): [facility]/pharmacy #[id number] - [place], [place] - [address] [phone] (Phone)  [phone] (Fax)  Comments:        "

## 2019-04-22 ENCOUNTER — PATIENT MESSAGE (OUTPATIENT)
Dept: INTERNAL MEDICINE | Facility: CLINIC | Age: 59
End: 2019-04-22

## 2019-04-23 RX ORDER — SCOLOPAMINE TRANSDERMAL SYSTEM 1 MG/1
1 PATCH, EXTENDED RELEASE TRANSDERMAL
Qty: 6 PATCH | Refills: 0 | Status: SHIPPED | OUTPATIENT
Start: 2019-04-23 | End: 2019-05-23

## 2019-04-24 ENCOUNTER — PATIENT MESSAGE (OUTPATIENT)
Dept: INTERNAL MEDICINE | Facility: CLINIC | Age: 59
End: 2019-04-24

## 2019-05-02 RX ORDER — ATORVASTATIN CALCIUM 10 MG/1
10 TABLET, FILM COATED ORAL NIGHTLY
Qty: 90 TABLET | Refills: 1 | Status: SHIPPED | OUTPATIENT
Start: 2019-05-02 | End: 2019-08-12 | Stop reason: SINTOL

## 2019-06-14 DIAGNOSIS — E11.9 TYPE 2 DIABETES MELLITUS WITHOUT COMPLICATION: ICD-10-CM

## 2019-07-07 DIAGNOSIS — E11.9 TYPE 2 DIABETES MELLITUS WITHOUT COMPLICATION: ICD-10-CM

## 2019-07-07 DIAGNOSIS — Z00.00 ANNUAL PHYSICAL EXAM: ICD-10-CM

## 2019-07-07 RX ORDER — LISINOPRIL 2.5 MG/1
TABLET ORAL
Qty: 90 TABLET | Refills: 1 | Status: SHIPPED | OUTPATIENT
Start: 2019-07-07 | End: 2019-08-12

## 2019-07-07 RX ORDER — METFORMIN HYDROCHLORIDE 1000 MG/1
TABLET ORAL
Qty: 180 TABLET | Refills: 1 | Status: SHIPPED | OUTPATIENT
Start: 2019-07-07 | End: 2020-09-14 | Stop reason: SDUPTHER

## 2019-08-08 ENCOUNTER — TELEPHONE (OUTPATIENT)
Dept: INTERNAL MEDICINE | Facility: CLINIC | Age: 59
End: 2019-08-08

## 2019-08-08 ENCOUNTER — PATIENT MESSAGE (OUTPATIENT)
Dept: INTERNAL MEDICINE | Facility: CLINIC | Age: 59
End: 2019-08-08

## 2019-08-08 DIAGNOSIS — E11.65 TYPE 2 DIABETES MELLITUS WITH HYPERGLYCEMIA, WITHOUT LONG-TERM CURRENT USE OF INSULIN: Primary | ICD-10-CM

## 2019-08-08 NOTE — TELEPHONE ENCOUNTER
----- Message from Zeina Helms sent at 8/8/2019  4:30 PM CDT -----  Contact: patient  Called to ask about lab work before next appointment.     He can be reached at [phone]    Thanks  KB

## 2019-08-09 ENCOUNTER — TELEPHONE (OUTPATIENT)
Dept: INTERNAL MEDICINE | Facility: CLINIC | Age: 59
End: 2019-08-09

## 2019-08-09 NOTE — PROGRESS NOTES
Subjective:       Patient ID: Darrell Hinds Jr. is a 59 y.o. male.    Chief Complaint: Annual Exam    Patient is a 59 y.o.male who presents today for follow up.    Cholesterol: reviewed  Vaccines: Influenza (yearly); Tetanus (2015) ; Pneumovax (done); Zoster (done)  Eye exam: due now  Colonoscopy: 2015; due in five years    He hasn't been taking the januvia due to cost. He stopped zetia as well.       Diabetes - Patient is on januvia and metformin and diabeta. He does not check his BG at home. His last eye exam was over a year ago.  He does check their feet on a regular basis.  He does not have numbness/tingling of his feet.  His last A1c's were   Hemoglobin A1C   Date Value Ref Range Status   08/10/2019 11.2 (H) 4.0 - 5.6 % Final     Comment:     ADA Screening Guidelines:  5.7-6.4%  Consistent with prediabetes  >or=6.5%  Consistent with diabetes  High levels of fetal hemoglobin interfere with the HbA1C  assay. Heterozygous hemoglobin variants (HbS, HgC, etc)do  not significantly interfere with this assay.   However, presence of multiple variants may affect accuracy.     12/03/2018 10.6 (H) 4.0 - 5.6 % Final     Comment:     ADA Screening Guidelines:  5.7-6.4%  Consistent with prediabetes  >or=6.5%  Consistent with diabetes  High levels of fetal hemoglobin interfere with the HbA1C  assay. Heterozygous hemoglobin variants (HbS, HgC, etc)do  not significantly interfere with this assay.   However, presence of multiple variants may affect accuracy.     12/05/2016 10.5 (H) 4.5 - 6.2 % Final     Comment:     According to ADA guidelines, hemoglobin A1C <7.0% represents  optimal control in non-pregnant diabetic patients.  Different  metrics may apply to specific populations.   Standards of Medical Care in Diabetes - 2016.  For the purpose of screening for the presence of diabetes:  <5.7%     Consistent with the absence of diabetes  5.7-6.4%  Consistent with increasing risk for diabetes   (prediabetes)  >or=6.5%   Consistent with diabetes  Currently no consensus exists for use of hemoglobin A1C  for diagnosis of diabetes for children.     .  Review of Systems   Constitutional: Negative for appetite change, chills and diaphoresis.   HENT: Negative for congestion, dental problem, drooling, ear discharge, ear pain, hearing loss, mouth sores and tinnitus.    Eyes: Negative for discharge, redness and itching.   Respiratory: Negative for chest tightness, shortness of breath and wheezing.    Cardiovascular: Negative for chest pain, palpitations and leg swelling.   Gastrointestinal: Negative for abdominal pain, constipation, diarrhea, nausea and vomiting.   Endocrine: Negative for cold intolerance and heat intolerance.   Genitourinary: Negative for decreased urine volume, difficulty urinating, flank pain and hematuria.   Musculoskeletal: Negative for arthralgias, gait problem and myalgias.   Skin: Negative for color change and rash.   Allergic/Immunologic: Negative for environmental allergies.   Neurological: Negative for dizziness, syncope and headaches.   Hematological: Negative for adenopathy.   Psychiatric/Behavioral: Negative for agitation, behavioral problems, confusion and sleep disturbance.       Objective:      Physical Exam   Constitutional: He is oriented to person, place, and time. He appears well-developed and well-nourished. No distress.   HENT:   Head: Normocephalic and atraumatic.   Right Ear: External ear normal.   Left Ear: External ear normal.   Nose: Nose normal.   Mouth/Throat: Oropharynx is clear and moist. No oropharyngeal exudate.   Eyes: Pupils are equal, round, and reactive to light. Conjunctivae and EOM are normal. Right eye exhibits no discharge. Left eye exhibits no discharge. No scleral icterus.   Neck: Neck supple. No JVD present. No tracheal deviation present. No thyromegaly present.   Cardiovascular: Normal rate, regular rhythm, normal heart sounds and intact distal pulses. Exam reveals no gallop and  no friction rub.   No murmur heard.  Pulmonary/Chest: Effort normal and breath sounds normal. No stridor. No respiratory distress. He has no wheezes. He has no rales. He exhibits no tenderness.   Abdominal: Soft. Bowel sounds are normal. He exhibits no distension. There is no tenderness. There is no rebound.   Musculoskeletal: He exhibits no edema or tenderness.   Lymphadenopathy:     He has no cervical adenopathy.   Neurological: He is alert and oriented to person, place, and time. He has normal reflexes. No cranial nerve deficit.   Skin: Skin is warm and dry. No rash noted. He is not diaphoretic. No erythema.   Psychiatric: He has a normal mood and affect. His behavior is normal.   Nursing note and vitals reviewed.      Assessment and Plan:       1. Essential hypertension  - stop acei; start losartan; call with bp readings in one week    2. Other hyperlipidemia  - restart zetia    3. Type 2 diabetes mellitus with hyperglycemia, without long-term current use of insulin  - not at goal  - discussed importance of compliance  - again pt refuses injectables at this time  - trial of januvia, glyburide and metformin  - rtc in 3 months  - Ambulatory Referral to Optometry  - Ambulatory referral to Endocrinology          No follow-ups on file.

## 2019-08-09 NOTE — TELEPHONE ENCOUNTER
He booked an annual for Monday and is not due till December.  I called and spoke to wife and moved him to fol up spot Monday because diabets not doing well and doesn't look good per wife.    We set up labs for tomorrow.  We also set up annual for phys and [facility]

## 2019-08-10 ENCOUNTER — LAB VISIT (OUTPATIENT)
Dept: LAB | Facility: HOSPITAL | Age: 59
End: 2019-08-10
Attending: INTERNAL MEDICINE
Payer: COMMERCIAL

## 2019-08-10 DIAGNOSIS — E11.65 TYPE 2 DIABETES MELLITUS WITH HYPERGLYCEMIA, WITHOUT LONG-TERM CURRENT USE OF INSULIN: ICD-10-CM

## 2019-08-10 LAB
ALBUMIN SERPL BCP-MCNC: 4.2 G/DL (ref 3.5–5.2)
ALP SERPL-CCNC: 81 U/L (ref 55–135)
ALT SERPL W/O P-5'-P-CCNC: 18 U/L (ref 10–44)
ANION GAP SERPL CALC-SCNC: 10 MMOL/L (ref 8–16)
AST SERPL-CCNC: 17 U/L (ref 10–40)
BILIRUB SERPL-MCNC: 0.6 MG/DL (ref 0.1–1)
BUN SERPL-MCNC: 12 MG/DL (ref 8–23)
CALCIUM SERPL-MCNC: 9.2 MG/DL (ref 8.7–10.5)
CHLORIDE SERPL-SCNC: 102 MMOL/L (ref 95–110)
CHOLEST SERPL-MCNC: 184 MG/DL (ref 120–199)
CHOLEST/HDLC SERPL: 5.4 {RATIO} (ref 2–5)
CO2 SERPL-SCNC: 24 MMOL/L (ref 23–29)
CREAT SERPL-MCNC: 1 MG/DL (ref 0.5–1.4)
EST. GFR  (AFRICAN AMERICAN): >60 ML/MIN/1.73 M^2
EST. GFR  (NON AFRICAN AMERICAN): >60 ML/MIN/1.73 M^2
ESTIMATED AVG GLUCOSE: 275 MG/DL (ref 68–131)
GLUCOSE SERPL-MCNC: 293 MG/DL (ref 70–110)
HBA1C MFR BLD HPLC: 11.2 % (ref 4–5.6)
HDLC SERPL-MCNC: 34 MG/DL (ref 40–75)
HDLC SERPL: 18.5 % (ref 20–50)
LDLC SERPL CALC-MCNC: 98 MG/DL (ref 63–159)
NONHDLC SERPL-MCNC: 150 MG/DL
POTASSIUM SERPL-SCNC: 5.1 MMOL/L (ref 3.5–5.1)
PROT SERPL-MCNC: 6.9 G/DL (ref 6–8.4)
SODIUM SERPL-SCNC: 136 MMOL/L (ref 136–145)
TRIGL SERPL-MCNC: 260 MG/DL (ref 30–150)

## 2019-08-10 PROCEDURE — 80053 COMPREHEN METABOLIC PANEL: CPT

## 2019-08-10 PROCEDURE — 80061 LIPID PANEL: CPT

## 2019-08-10 PROCEDURE — 36415 COLL VENOUS BLD VENIPUNCTURE: CPT

## 2019-08-10 PROCEDURE — 83036 HEMOGLOBIN GLYCOSYLATED A1C: CPT

## 2019-08-12 ENCOUNTER — OFFICE VISIT (OUTPATIENT)
Dept: INTERNAL MEDICINE | Facility: CLINIC | Age: 59
End: 2019-08-12
Payer: COMMERCIAL

## 2019-08-12 ENCOUNTER — TELEPHONE (OUTPATIENT)
Dept: INTERNAL MEDICINE | Facility: CLINIC | Age: 59
End: 2019-08-12

## 2019-08-12 VITALS
RESPIRATION RATE: 16 BRPM | DIASTOLIC BLOOD PRESSURE: 84 MMHG | HEIGHT: 69 IN | WEIGHT: 182.13 LBS | BODY MASS INDEX: 26.97 KG/M2 | TEMPERATURE: 97 F | SYSTOLIC BLOOD PRESSURE: 136 MMHG | HEART RATE: 64 BPM

## 2019-08-12 DIAGNOSIS — Z12.5 PROSTATE CANCER SCREENING: ICD-10-CM

## 2019-08-12 DIAGNOSIS — E11.65 TYPE 2 DIABETES MELLITUS WITH HYPERGLYCEMIA, WITHOUT LONG-TERM CURRENT USE OF INSULIN: ICD-10-CM

## 2019-08-12 DIAGNOSIS — I10 ESSENTIAL HYPERTENSION: Primary | ICD-10-CM

## 2019-08-12 DIAGNOSIS — E78.49 OTHER HYPERLIPIDEMIA: ICD-10-CM

## 2019-08-12 DIAGNOSIS — E55.9 VITAMIN D DEFICIENCY: ICD-10-CM

## 2019-08-12 DIAGNOSIS — E11.65 TYPE 2 DIABETES MELLITUS WITH HYPERGLYCEMIA, WITHOUT LONG-TERM CURRENT USE OF INSULIN: Chronic | ICD-10-CM

## 2019-08-12 DIAGNOSIS — Z00.00 ANNUAL PHYSICAL EXAM: ICD-10-CM

## 2019-08-12 PROCEDURE — 99214 OFFICE O/P EST MOD 30 MIN: CPT | Mod: S$GLB,,, | Performed by: INTERNAL MEDICINE

## 2019-08-12 PROCEDURE — 99999 PR PBB SHADOW E&M-EST. PATIENT-LVL IV: CPT | Mod: PBBFAC,,, | Performed by: INTERNAL MEDICINE

## 2019-08-12 PROCEDURE — 99214 PR OFFICE/OUTPT VISIT, EST, LEVL IV, 30-39 MIN: ICD-10-PCS | Mod: S$GLB,,, | Performed by: INTERNAL MEDICINE

## 2019-08-12 PROCEDURE — 99999 PR PBB SHADOW E&M-EST. PATIENT-LVL IV: ICD-10-PCS | Mod: PBBFAC,,, | Performed by: INTERNAL MEDICINE

## 2019-08-12 RX ORDER — EZETIMIBE 10 MG/1
10 TABLET ORAL DAILY
Qty: 90 TABLET | Refills: 3 | Status: SHIPPED | OUTPATIENT
Start: 2019-08-12 | End: 2020-01-20 | Stop reason: SDUPTHER

## 2019-08-12 RX ORDER — LANCETS
EACH MISCELLANEOUS
Qty: 100 EACH | Refills: 3 | Status: SHIPPED | OUTPATIENT
Start: 2019-08-12 | End: 2021-09-09

## 2019-08-12 RX ORDER — DEXTROSE 4 G
TABLET,CHEWABLE ORAL
Qty: 1 EACH | Refills: 0 | Status: SHIPPED | OUTPATIENT
Start: 2019-08-12 | End: 2021-09-09

## 2019-08-12 RX ORDER — LOSARTAN POTASSIUM 25 MG/1
12.5 TABLET ORAL DAILY
Qty: 45 TABLET | Refills: 3 | Status: SHIPPED | OUTPATIENT
Start: 2019-08-12 | End: 2020-01-20 | Stop reason: SDUPTHER

## 2019-08-12 RX ORDER — DEXTROSE 4 G
TABLET,CHEWABLE ORAL
Qty: 1 EACH | Refills: 0 | Status: SHIPPED | OUTPATIENT
Start: 2019-08-12 | End: 2019-08-12 | Stop reason: SDUPTHER

## 2019-08-12 RX ORDER — LANCETS
EACH MISCELLANEOUS
Qty: 100 EACH | Refills: 3 | Status: SHIPPED | OUTPATIENT
Start: 2019-08-12 | End: 2019-08-12 | Stop reason: SDUPTHER

## 2019-08-12 NOTE — TELEPHONE ENCOUNTER
----- Message from Stefanie Maldonado sent at 8/12/2019 12:01 PM CDT -----  Doctor appointment and lab have been scheduled.  Please link lab orders to the lab appointment.  Date of doctor appointment:  12/30  Date of lab appointment:  12/20  Physical or EP: Physical  Comments:

## 2019-08-27 ENCOUNTER — OFFICE VISIT (OUTPATIENT)
Dept: OPTOMETRY | Facility: CLINIC | Age: 59
End: 2019-08-27
Payer: COMMERCIAL

## 2019-08-27 DIAGNOSIS — E11.3393 MODERATE NONPROLIFERATIVE DIABETIC RETINOPATHY OF BOTH EYES WITHOUT MACULAR EDEMA ASSOCIATED WITH TYPE 2 DIABETES MELLITUS: Primary | ICD-10-CM

## 2019-08-27 PROCEDURE — 92014 COMPRE OPH EXAM EST PT 1/>: CPT | Mod: S$GLB,,, | Performed by: OPTOMETRIST

## 2019-08-27 PROCEDURE — 99999 PR PBB SHADOW E&M-EST. PATIENT-LVL II: ICD-10-PCS | Mod: PBBFAC,,, | Performed by: OPTOMETRIST

## 2019-08-27 PROCEDURE — 99999 PR PBB SHADOW E&M-EST. PATIENT-LVL II: CPT | Mod: PBBFAC,,, | Performed by: OPTOMETRIST

## 2019-08-27 PROCEDURE — 92014 PR EYE EXAM, EST PATIENT,COMPREHESV: ICD-10-PCS | Mod: S$GLB,,, | Performed by: OPTOMETRIST

## 2019-08-27 NOTE — PROGRESS NOTES
HPI     NITISH 09/2016 Diabetic  this afternoon.   Wears OTC +1.50 readers,   seems to work fine.  Distance seems fine without glasses.  Patient hasn't   noticed any vision changes. Not using any drops.    Hemoglobin A1C       Date                     Value               Ref Range             Status                08/10/2019               11.2 (H)            4.0 - 5.6 %           Final              12/03/2018               10.6 (H)            4.0 - 5.6 %           Final                 12/05/2016               10.5 (H)            4.5 - 6.2 %           Final         Last edited by Cris Krueger on 8/27/2019  2:00 PM. (History)            Assessment /Plan     For exam results, see Encounter Report.    Moderate nonproliferative diabetic retinopathy of both eyes without macular edema associated with type 2 diabetes mellitus      1. A1C elevated, moderate ret with some exudates right eye near macula, educated pt to importance of blood sugar control to prevent vision loss. RTc with Dilan gaspar.

## 2019-08-27 NOTE — Clinical Note
Saw pt for diabetic eye exam. I am concerned about level of retinopathy and A1C. I referred to retina for long term monitoring. Educated pt to importance of blood sugar control to prevent vision loss. Thanks for sending Flakito

## 2019-08-27 NOTE — LETTER
August 27, 2019      Elissa Navarro DO  [address]  [place] [address]           [place] [facility]  [address]  [place] [address]  Phone: [phone]  Fax: [phone]          Patient: Darrell Hinds Jr.   MR Number: [id number]   YOB: 1960   Date of Visit: 8/27/2019       Dear Dr. Elissa Navarro:    Thank you for referring Darrell Hinds to me for evaluation. Attached you will find relevant portions of my assessment and plan of care.    If you have questions, please do not hesitate to call me. I look forward to following Darrell Hinds along with you.    Sincerely,    Jagjit Pickett, OD    Enclosure  CC:  No Recipients    If you would like to receive this communication electronically, please contact [email or web address] or [phone] to request more information on [employer] Link access.    For providers and/or their staff who would like to refer a patient to [facility], please contact us through our one-stop-shop provider referral line, [facility] [place], at [phone].    If you feel you have received this communication in error or would no longer like to receive these types of communications, please e-mail [email or web address]

## 2019-09-02 RX ORDER — GLYBURIDE 5 MG/1
10 TABLET ORAL 2 TIMES DAILY WITH MEALS
Qty: 360 TABLET | Refills: 1 | Status: SHIPPED | OUTPATIENT
Start: 2019-09-02 | End: 2020-01-20 | Stop reason: SDUPTHER

## 2019-09-05 ENCOUNTER — OFFICE VISIT (OUTPATIENT)
Dept: OPHTHALMOLOGY | Facility: CLINIC | Age: 59
End: 2019-09-05
Payer: COMMERCIAL

## 2019-09-05 VITALS — SYSTOLIC BLOOD PRESSURE: 135 MMHG | DIASTOLIC BLOOD PRESSURE: 87 MMHG | HEART RATE: 76 BPM

## 2019-09-05 DIAGNOSIS — H35.033 HYPERTENSIVE RETINOPATHY, BILATERAL: ICD-10-CM

## 2019-09-05 DIAGNOSIS — H25.13 NS (NUCLEAR SCLEROSIS), BILATERAL: ICD-10-CM

## 2019-09-05 PROCEDURE — 92225 PR SPECIAL EYE EXAM, INITIAL: ICD-10-PCS | Mod: RT,S$GLB,, | Performed by: OPHTHALMOLOGY

## 2019-09-05 PROCEDURE — 99999 PR PBB SHADOW E&M-EST. PATIENT-LVL III: ICD-10-PCS | Mod: PBBFAC,,, | Performed by: OPHTHALMOLOGY

## 2019-09-05 PROCEDURE — 99999 PR PBB SHADOW E&M-EST. PATIENT-LVL III: CPT | Mod: PBBFAC,,, | Performed by: OPHTHALMOLOGY

## 2019-09-05 PROCEDURE — 92014 PR EYE EXAM, EST PATIENT,COMPREHESV: ICD-10-PCS | Mod: S$GLB,,, | Performed by: OPHTHALMOLOGY

## 2019-09-05 PROCEDURE — 92225 PR SPECIAL EYE EXAM, INITIAL: CPT | Mod: RT,S$GLB,, | Performed by: OPHTHALMOLOGY

## 2019-09-05 PROCEDURE — 92014 COMPRE OPH EXAM EST PT 1/>: CPT | Mod: S$GLB,,, | Performed by: OPHTHALMOLOGY

## 2019-09-05 RX ORDER — FLUORESCEIN 500 MG/ML
5 INJECTION INTRAVENOUS ONCE
Status: COMPLETED | OUTPATIENT
Start: 2019-09-05 | End: 2019-12-05

## 2019-09-05 NOTE — LETTER
September 5, 2019      Jagjit Pickett, OD  [address]  [place] [address]           [place] [facility]  [address] [facility].  [place] [address]  Phone: [phone]  Fax: [phone]          Patient: Darrell Hinds Jr.   MR Number: [id number]   YOB: 1960   Date of Visit: 9/5/2019       Dear Dr. Jagjit Pickett:    Thank you for referring Darrell Hinds to me for evaluation. Attached you will find relevant portions of my assessment and plan of care.    If you have questions, please do not hesitate to call me. I look forward to following Darrell Hinds along with you.    Sincerely,    GABRIELLE Kong MD    Enclosure  CC:  No Recipients    If you would like to receive this communication electronically, please contact [email or web address] or [phone] to request more information on [employer] Link access.    For providers and/or their staff who would like to refer a patient to [facility], please contact us through our one-stop-shop provider referral line, [facility], at [phone].    If you feel you have received this communication in error or would no longer like to receive these types of communications, please e-mail [email or web address]

## 2019-09-05 NOTE — PROGRESS NOTES
HPI     Diabetic Retinopathy Consult Per Dr. Pickett DLS-08/27/2019 Dr. Pickett     Pt sts the only change noticed in vision is reading vision and needing OTC readers more often.  Denies pain     (-)Flashes (-)Floaters  (-)Photophobia  (-)Glare    No gtts     Last edited by Anabell Almaguer on 9/5/2019  9:12 AM. (History)        OCT - non central DME OU      A/P    1. Mod NPDR OU  T2 uncontrolled without insulin    2. DME OU  Not foveal involving today    3. HTN Ret OU  BS/BP/chol control    4. Early NS OU      3 months OCT/FA OS

## 2019-11-30 ENCOUNTER — PATIENT OUTREACH (OUTPATIENT)
Dept: ADMINISTRATIVE | Facility: OTHER | Age: 59
End: 2019-11-30

## 2019-12-05 ENCOUNTER — OFFICE VISIT (OUTPATIENT)
Dept: OPHTHALMOLOGY | Facility: CLINIC | Age: 59
End: 2019-12-05
Payer: COMMERCIAL

## 2019-12-05 VITALS — HEART RATE: 77 BPM | SYSTOLIC BLOOD PRESSURE: 138 MMHG | DIASTOLIC BLOOD PRESSURE: 77 MMHG

## 2019-12-05 DIAGNOSIS — H35.033 HYPERTENSIVE RETINOPATHY, BILATERAL: ICD-10-CM

## 2019-12-05 DIAGNOSIS — H25.13 NS (NUCLEAR SCLEROSIS), BILATERAL: ICD-10-CM

## 2019-12-05 PROCEDURE — 92014 PR EYE EXAM, EST PATIENT,COMPREHESV: ICD-10-PCS | Mod: S$GLB,,, | Performed by: OPHTHALMOLOGY

## 2019-12-05 PROCEDURE — 92226 PR SPECIAL EYE EXAM, SUBSEQUENT: CPT | Mod: RT,S$GLB,, | Performed by: OPHTHALMOLOGY

## 2019-12-05 PROCEDURE — 92134 CPTRZ OPH DX IMG PST SGM RTA: CPT | Mod: S$GLB,,, | Performed by: OPHTHALMOLOGY

## 2019-12-05 PROCEDURE — 99999 PR PBB SHADOW E&M-EST. PATIENT-LVL III: CPT | Mod: PBBFAC,,, | Performed by: OPHTHALMOLOGY

## 2019-12-05 PROCEDURE — 92235 FLUORESCEIN ANGIOGRAPHY - OU - BOTH EYES: ICD-10-PCS | Mod: S$GLB,,, | Performed by: OPHTHALMOLOGY

## 2019-12-05 PROCEDURE — 92226 PR SPECIAL EYE EXAM, SUBSEQUENT: ICD-10-PCS | Mod: LT,S$GLB,, | Performed by: OPHTHALMOLOGY

## 2019-12-05 PROCEDURE — 92014 COMPRE OPH EXAM EST PT 1/>: CPT | Mod: S$GLB,,, | Performed by: OPHTHALMOLOGY

## 2019-12-05 PROCEDURE — 92235 FLUORESCEIN ANGRPH MLTIFRAME: CPT | Mod: S$GLB,,, | Performed by: OPHTHALMOLOGY

## 2019-12-05 PROCEDURE — 92134 POSTERIOR SEGMENT OCT RETINA (OCULAR COHERENCE TOMOGRAPHY)-BOTH EYES: ICD-10-PCS | Mod: S$GLB,,, | Performed by: OPHTHALMOLOGY

## 2019-12-05 PROCEDURE — 99999 PR PBB SHADOW E&M-EST. PATIENT-LVL III: ICD-10-PCS | Mod: PBBFAC,,, | Performed by: OPHTHALMOLOGY

## 2019-12-05 RX ADMIN — FLUORESCEIN 500 MG: 500 INJECTION INTRAVENOUS at 09:12

## 2019-12-05 NOTE — PROGRESS NOTES
HPI     DLS: 09/05/2019 Dr. Kong    Patient states his vision has been stable since his last visit.     No gtts      HPI     Diabetic Retinopathy Consult Per Dr. Pickett DLS-08/27/2019 Dr. Pickett     Pt sts the only change noticed in vision is reading vision and needing OTC readers more often.  Denies pain     (-)Flashes (-)Floaters  (-)Photophobia  (-)Glare    No gtts     Last edited by Anabell Almaguer on 9/5/2019  9:12 AM. (History)        OCT - non central DME OU    FA - MA's , minimal late leakage, peripheral NP without  NV, early SHERWIN      A/P    1. Severe NPDR OU  T2 uncontrolled without insulin    2. DME OU  Not foveal involving today    3. HTN Ret OU  BS/BP/chol control    4. Early NS OU      4 months OCT/repeat FA OS

## 2019-12-05 NOTE — PATIENT INSTRUCTIONS
Fluorescein Angiography     A fluorescein angiogram of the retina   Fluorescein angiography is an eye test. It is done to look at the back of your eye, including:  · The blood vessels in your eye  · The layer of tissue at the back of your eye (the retina)  · The center of your retina (the macula)  · The optic nerve  This test can diagnose diseases found in these areas. It can also diagnose other conditions that affect these areas. To do this test, a dye called fluorescein is shot (injected) into your arm. The dye goes into your bloodstream and up into the blood vessels in your eyes. A special camera is then used to take images (angiograms) of your eyes.  Getting ready for your test  Tell your healthcare provider if you:  · Are pregnant or think you may be pregnant  · Are breastfeeding  · Have a history of severe allergic reactions, including to X-ray dye or other medicines  · Have kidney problems  Tell your provider about any medicines you are taking. You may need to stop taking all or some of these before the test. This includes:  · All prescription medicines  · Over-the-counter medicines such as aspirin or ibuprofen  · Street drugs  · Herbs, vitamins, and other supplements  You should arrange for an adult family member or friend to drive you home after your test. Your vision will be blurry for up to 12 hours.  Follow any other instructions from your healthcare provider.  During your test  · You are given eye drops to enlarge (dilate) your pupils.  · You then sit in front of a special camera. You place your chin on the chin rest and look into the camera.  · Images are taken of your eyes, one eye at a time.  · Fluorescein dye is then injected into your arm. The lights in the room are turned off. You may have mild nausea. You may have a warm feeling in your arm or upper body. Tell your provider if your skin feels itchy or if you are having trouble breathing. If so, you could be having an allergic reaction to the  dye.  · More pictures of your eyes are taken over 15 to 30 minutes. The camera shines a bright light into your eyes. Try to keep your head still and your eyes open.  · When enough images have been taken, the test is over.  After your test  Your vision will be blurry for up to 4 to 12 hours. This is because of your dilated pupils. Your eye will be more sensitive to light for up to 12 hours. You may want to wear sunglasses during this time. Do not drive if your vision is very blurry. You may also find it uncomfortable to read. Your skin may look yellow for a few hours. This is from the dye. Your urine will be bright yellow or orange for 24 to 48 hours after the test.     Risks and possible complications  All procedures have some risks. Possible risks of fluorescein angiography include:  · Upset stomach (nausea) and vomiting  · Leaking dye around the injection site that causes pain and swelling  · Metallic taste in your mouth  · Infection at injection site  · Allergic reaction to the dye  · Dry mouth or too much saliva  · Faster heart rate  · Sweating  · Lower back pain   Date Last Reviewed: 5/30/2015  © 6342-6181 [employer]. [address] [place] [address], [place] [address]. All rights reserved. This information is not intended as a substitute for professional medical care. Always follow your healthcare professional's instructions.

## 2019-12-16 NOTE — PROGRESS NOTES
Subjective:       Patient ID: Darrell Hinds Jr. is a 59 y.o. male.    Chief Complaint: Annual Exam    Patient is a 59 y.o.male who presents today for annual    SOB UPON EXERTION:  - does have some pain in his arm as well.   - ongoing for a few months  - pain does shoot down his left arm at times.      He had a lifeline screening; he was told there was a blockage in his left lower extremity       He thinks he tore his left bicep tendon; when he flexes, the muscle moves strangely; wants to see ortho    Cholesterol: due now' pending  Vaccines: Influenza (yearly); Tetanus (2015) ; Pneumovax (done); Zoster (done)  Eye exam: done  Colonoscopy: 2015; due in five years  Review of Systems   Constitutional: Negative for appetite change, chills and diaphoresis.   HENT: Negative for congestion, dental problem, drooling, ear discharge, ear pain, hearing loss, mouth sores and tinnitus.    Eyes: Negative for discharge, redness and itching.   Respiratory: Positive for shortness of breath. Negative for chest tightness and wheezing.    Cardiovascular: Negative for chest pain, palpitations and leg swelling.   Gastrointestinal: Negative for abdominal pain, constipation, diarrhea, nausea and vomiting.   Endocrine: Negative for cold intolerance and heat intolerance.   Genitourinary: Negative for decreased urine volume, difficulty urinating, flank pain and hematuria.   Musculoskeletal: Negative for arthralgias, gait problem and myalgias.   Skin: Negative for color change and rash.   Allergic/Immunologic: Negative for environmental allergies.   Neurological: Negative for dizziness, syncope and headaches.   Hematological: Negative for adenopathy.   Psychiatric/Behavioral: Negative for agitation, behavioral problems, confusion and sleep disturbance.       Objective:      Physical Exam   Constitutional: He is oriented to person, place, and time. He appears well-developed and well-nourished. No distress.   HENT:   Head: Normocephalic and  atraumatic.   Right Ear: External ear normal.   Left Ear: External ear normal.   Nose: Nose normal.   Mouth/Throat: Oropharynx is clear and moist. No oropharyngeal exudate.   Eyes: Pupils are equal, round, and reactive to light. Conjunctivae and EOM are normal. Right eye exhibits no discharge. Left eye exhibits no discharge. No scleral icterus.   Neck: Neck supple. No JVD present. No tracheal deviation present. No thyromegaly present.   Cardiovascular: Normal rate, regular rhythm, normal heart sounds and intact distal pulses. Exam reveals no gallop and no friction rub.   No murmur heard.  Pulses:       Dorsalis pedis pulses are 2+ on the right side, and 2+ on the left side.        Posterior tibial pulses are 2+ on the right side, and 2+ on the left side.   Pulmonary/Chest: Effort normal and breath sounds normal. No stridor. No respiratory distress. He has no wheezes. He has no rales. He exhibits no tenderness.   Abdominal: Soft. Bowel sounds are normal. He exhibits no distension. There is no tenderness. There is no rebound.   Musculoskeletal: He exhibits no edema or tenderness.        Right foot: There is normal range of motion and no deformity.        Left foot: There is normal range of motion and no deformity.   Feet:   Right Foot:   Protective Sensation: 3 sites tested. 3 sites sensed.   Skin Integrity: Negative for ulcer, blister, skin breakdown, erythema, warmth, callus or dry skin.   Left Foot:   Protective Sensation: 3 sites tested. 3 sites sensed.   Skin Integrity: Negative for ulcer, blister, skin breakdown, erythema, warmth, callus or dry skin.   Lymphadenopathy:     He has no cervical adenopathy.   Neurological: He is alert and oriented to person, place, and time. He has normal reflexes. No cranial nerve deficit.   Skin: Skin is warm and dry. No rash noted. He is not diaphoretic. No erythema.   Psychiatric: He has a normal mood and affect. His behavior is normal.   Nursing note and vitals reviewed.       Assessment and Plan:       1. Annual physical exam      2. Type 2 diabetes mellitus with hyperglycemia, without long-term current use of insulin      3. Essential hypertension      4. Other hyperlipidemia      5. Uncontrolled type 2 diabetes mellitus with both eyes affected by moderate nonproliferative retinopathy and macular edema, without long-term current use of insulin  - labs pending    6. CESPEDES (dyspnea on exertion)    - CV Ultrasound Bilateral Doppler Carotid; Future  - CV Ultrasound doppler arterial legs bilat; Future  - CV US Lower Extremity Veins Bilateral Insufficiency; Future  - EKG 12-lead  - Stress Echo Which stress agent will be used? Treadmill Exercise; Color Flow Doppler? No; Future    7. Rupture of left biceps tendon, initial encounter    - Ambulatory referral/consult to Orthopedics; Future          No follow-ups on file.

## 2019-12-30 ENCOUNTER — CLINICAL SUPPORT (OUTPATIENT)
Dept: CARDIOLOGY | Facility: CLINIC | Age: 59
End: 2019-12-30
Attending: INTERNAL MEDICINE

## 2019-12-30 ENCOUNTER — LAB VISIT (OUTPATIENT)
Dept: LAB | Facility: HOSPITAL | Age: 59
End: 2019-12-30
Attending: INTERNAL MEDICINE

## 2019-12-30 ENCOUNTER — TELEPHONE (OUTPATIENT)
Dept: INTERNAL MEDICINE | Facility: CLINIC | Age: 59
End: 2019-12-30

## 2019-12-30 ENCOUNTER — OFFICE VISIT (OUTPATIENT)
Dept: INTERNAL MEDICINE | Facility: CLINIC | Age: 59
End: 2019-12-30

## 2019-12-30 VITALS
BODY MASS INDEX: 28.11 KG/M2 | HEIGHT: 69 IN | DIASTOLIC BLOOD PRESSURE: 62 MMHG | SYSTOLIC BLOOD PRESSURE: 134 MMHG | WEIGHT: 189.81 LBS | RESPIRATION RATE: 16 BRPM | HEART RATE: 78 BPM | TEMPERATURE: 98 F

## 2019-12-30 DIAGNOSIS — S46.212A RUPTURE OF LEFT BICEPS TENDON, INITIAL ENCOUNTER: ICD-10-CM

## 2019-12-30 DIAGNOSIS — E11.9 TYPE 2 DIABETES MELLITUS WITHOUT COMPLICATION: ICD-10-CM

## 2019-12-30 DIAGNOSIS — R06.09 DOE (DYSPNEA ON EXERTION): ICD-10-CM

## 2019-12-30 DIAGNOSIS — E78.49 OTHER HYPERLIPIDEMIA: ICD-10-CM

## 2019-12-30 DIAGNOSIS — I10 ESSENTIAL HYPERTENSION: ICD-10-CM

## 2019-12-30 DIAGNOSIS — Z12.5 PROSTATE CANCER SCREENING: ICD-10-CM

## 2019-12-30 DIAGNOSIS — Z78.9 STATIN INTOLERANCE: ICD-10-CM

## 2019-12-30 DIAGNOSIS — E11.65 TYPE 2 DIABETES MELLITUS WITH HYPERGLYCEMIA, WITHOUT LONG-TERM CURRENT USE OF INSULIN: ICD-10-CM

## 2019-12-30 DIAGNOSIS — E55.9 VITAMIN D DEFICIENCY: ICD-10-CM

## 2019-12-30 DIAGNOSIS — Z00.00 ANNUAL PHYSICAL EXAM: Primary | ICD-10-CM

## 2019-12-30 DIAGNOSIS — E11.65 TYPE 2 DIABETES MELLITUS WITH HYPERGLYCEMIA, WITHOUT LONG-TERM CURRENT USE OF INSULIN: Chronic | ICD-10-CM

## 2019-12-30 DIAGNOSIS — Z00.00 ANNUAL PHYSICAL EXAM: ICD-10-CM

## 2019-12-30 LAB
25(OH)D3+25(OH)D2 SERPL-MCNC: 23 NG/ML (ref 30–96)
ALBUMIN SERPL BCP-MCNC: 4.3 G/DL (ref 3.5–5.2)
ALP SERPL-CCNC: 69 U/L (ref 55–135)
ALT SERPL W/O P-5'-P-CCNC: 21 U/L (ref 10–44)
ANION GAP SERPL CALC-SCNC: 10 MMOL/L (ref 8–16)
AST SERPL-CCNC: 17 U/L (ref 10–40)
BASOPHILS # BLD AUTO: 0.02 K/UL (ref 0–0.2)
BASOPHILS NFR BLD: 0.3 % (ref 0–1.9)
BILIRUB SERPL-MCNC: 0.7 MG/DL (ref 0.1–1)
BUN SERPL-MCNC: 13 MG/DL (ref 8–23)
CALCIUM SERPL-MCNC: 9.3 MG/DL (ref 8.7–10.5)
CHLORIDE SERPL-SCNC: 105 MMOL/L (ref 95–110)
CHOLEST SERPL-MCNC: 171 MG/DL (ref 120–199)
CHOLEST/HDLC SERPL: 4.2 {RATIO} (ref 2–5)
CO2 SERPL-SCNC: 23 MMOL/L (ref 23–29)
COMPLEXED PSA SERPL-MCNC: 0.51 NG/ML (ref 0–4)
CREAT SERPL-MCNC: 0.9 MG/DL (ref 0.5–1.4)
DIFFERENTIAL METHOD: ABNORMAL
EOSINOPHIL # BLD AUTO: 0 K/UL (ref 0–0.5)
EOSINOPHIL NFR BLD: 0.6 % (ref 0–8)
ERYTHROCYTE [DISTWIDTH] IN BLOOD BY AUTOMATED COUNT: 12.6 % (ref 11.5–14.5)
EST. GFR  (AFRICAN AMERICAN): >60 ML/MIN/1.73 M^2
EST. GFR  (NON AFRICAN AMERICAN): >60 ML/MIN/1.73 M^2
ESTIMATED AVG GLUCOSE: 235 MG/DL (ref 68–131)
ESTIMATED AVG GLUCOSE: 235 MG/DL (ref 68–131)
GLUCOSE SERPL-MCNC: 216 MG/DL (ref 70–110)
HBA1C MFR BLD HPLC: 9.8 % (ref 4–5.6)
HBA1C MFR BLD HPLC: 9.8 % (ref 4–5.6)
HCT VFR BLD AUTO: 46.4 % (ref 40–54)
HDLC SERPL-MCNC: 41 MG/DL (ref 40–75)
HDLC SERPL: 24 % (ref 20–50)
HGB BLD-MCNC: 15.3 G/DL (ref 14–18)
IMM GRANULOCYTES # BLD AUTO: 0.05 K/UL (ref 0–0.04)
IMM GRANULOCYTES NFR BLD AUTO: 0.8 % (ref 0–0.5)
LDLC SERPL CALC-MCNC: 97.8 MG/DL (ref 63–159)
LEFT ANT TIBIAL SYS PSV: 84 CM/S
LEFT CFA PSV: 78 CM/S
LEFT EXTERNAL ILIAC PSV: 78 CM/S
LEFT PERONEAL SYS PSV: 38 CM/S
LEFT POPLITEAL PSV: 107 CM/S
LEFT POST TIBIAL SYS PSV: 68 CM/S
LEFT PROFUNDA SYS PSV: 68 CM/S
LEFT SUPER FEMORAL DIST SYS PSV: 98 CM/S
LEFT SUPER FEMORAL MID SYS PSV: 104 CM/S
LEFT SUPER FEMORAL OSTIAL SYS PSV: 80 CM/S
LEFT SUPER FEMORAL PROX SYS PSV: 80 CM/S
LEFT TIB/PER TRUNK SYS PSV: 111 CM/S
LYMPHOCYTES # BLD AUTO: 1.8 K/UL (ref 1–4.8)
LYMPHOCYTES NFR BLD: 27.6 % (ref 18–48)
MCH RBC QN AUTO: 30.7 PG (ref 27–31)
MCHC RBC AUTO-ENTMCNC: 33 G/DL (ref 32–36)
MCV RBC AUTO: 93 FL (ref 82–98)
MONOCYTES # BLD AUTO: 0.4 K/UL (ref 0.3–1)
MONOCYTES NFR BLD: 5.5 % (ref 4–15)
NEUTROPHILS # BLD AUTO: 4.1 K/UL (ref 1.8–7.7)
NEUTROPHILS NFR BLD: 65.2 % (ref 38–73)
NONHDLC SERPL-MCNC: 130 MG/DL
NRBC BLD-RTO: 0 /100 WBC
OHS CV LEFT COMMON ILIAC ARTERY PSV: 109 CM/S
OHS CV LEFT LOWER EXTREMITY ABI (NO CALC): 1.43
OHS CV RIGHT ABI LOWER EXTREMITY (NO CALC): 1.21
OHS CV US RIGHT COMMON ILIAC PSV: 128 CM/S
PLATELET # BLD AUTO: 219 K/UL (ref 150–350)
PMV BLD AUTO: 10.4 FL (ref 9.2–12.9)
POTASSIUM SERPL-SCNC: 4.6 MMOL/L (ref 3.5–5.1)
PROT SERPL-MCNC: 7 G/DL (ref 6–8.4)
RBC # BLD AUTO: 4.98 M/UL (ref 4.6–6.2)
RIGHT ANT TIBIAL SYS PSV: 46 CM/S
RIGHT CFA PSV: 85 CM/S
RIGHT EXTERNAL ILLIAC PSV: 115 CM/S
RIGHT PERONEAL SYS PSV: 56 CM/S
RIGHT POPLITEAL PSV: 79 CM/S
RIGHT POST TIBIAL SYS PSV: 85 CM/S
RIGHT PROFUNDA SYS PSV: 80 CM/S
RIGHT SUPER FEMORAL DIST SYS PSV: 103 CM/S
RIGHT SUPER FEMORAL MID SYS PSV: 130 CM/S
RIGHT SUPER FEMORAL OSTIAL SYS PSV: 77 CM/S
RIGHT SUPER FEMORAL PROX SYS PSV: 107 CM/S
RIGHT TIB/PER TRUNK SYS PSV: 105 CM/S
SODIUM SERPL-SCNC: 138 MMOL/L (ref 136–145)
TESTOST SERPL-MCNC: 453 NG/DL (ref 304–1227)
TRIGL SERPL-MCNC: 161 MG/DL (ref 30–150)
TSH SERPL DL<=0.005 MIU/L-ACNC: 0.77 UIU/ML (ref 0.4–4)
WBC # BLD AUTO: 6.35 K/UL (ref 3.9–12.7)

## 2019-12-30 PROCEDURE — 93925 LOWER EXTREMITY STUDY: CPT | Mod: PBBFAC,PO | Performed by: INTERNAL MEDICINE

## 2019-12-30 PROCEDURE — 84153 ASSAY OF PSA TOTAL: CPT

## 2019-12-30 PROCEDURE — 99999 PR PBB SHADOW E&M-EST. PATIENT-LVL III: CPT | Mod: PBBFAC,,, | Performed by: INTERNAL MEDICINE

## 2019-12-30 PROCEDURE — 93925 CV US DOPPLER ARTERIAL LEGS BILATERAL (CUPID ONLY): ICD-10-PCS | Mod: 26,S$PBB,, | Performed by: INTERNAL MEDICINE

## 2019-12-30 PROCEDURE — 99213 OFFICE O/P EST LOW 20 MIN: CPT | Mod: PBBFAC,25,PO | Performed by: INTERNAL MEDICINE

## 2019-12-30 PROCEDURE — 85025 COMPLETE CBC W/AUTO DIFF WBC: CPT

## 2019-12-30 PROCEDURE — 80053 COMPREHEN METABOLIC PANEL: CPT

## 2019-12-30 PROCEDURE — 83036 HEMOGLOBIN GLYCOSYLATED A1C: CPT

## 2019-12-30 PROCEDURE — 93010 EKG 12-LEAD: ICD-10-PCS | Mod: S$PBB,,, | Performed by: INTERNAL MEDICINE

## 2019-12-30 PROCEDURE — 82306 VITAMIN D 25 HYDROXY: CPT

## 2019-12-30 PROCEDURE — 93005 ELECTROCARDIOGRAM TRACING: CPT | Mod: PBBFAC,PO | Performed by: INTERNAL MEDICINE

## 2019-12-30 PROCEDURE — 99396 PR PREVENTIVE VISIT,EST,40-64: ICD-10-PCS | Mod: S$PBB,,, | Performed by: INTERNAL MEDICINE

## 2019-12-30 PROCEDURE — 99999 PR PBB SHADOW E&M-EST. PATIENT-LVL III: ICD-10-PCS | Mod: PBBFAC,,, | Performed by: INTERNAL MEDICINE

## 2019-12-30 PROCEDURE — 84403 ASSAY OF TOTAL TESTOSTERONE: CPT

## 2019-12-30 PROCEDURE — 80061 LIPID PANEL: CPT

## 2019-12-30 PROCEDURE — 93010 ELECTROCARDIOGRAM REPORT: CPT | Mod: S$PBB,,, | Performed by: INTERNAL MEDICINE

## 2019-12-30 PROCEDURE — 99396 PREV VISIT EST AGE 40-64: CPT | Mod: S$PBB,,, | Performed by: INTERNAL MEDICINE

## 2019-12-30 PROCEDURE — 84443 ASSAY THYROID STIM HORMONE: CPT

## 2019-12-30 PROCEDURE — 36415 COLL VENOUS BLD VENIPUNCTURE: CPT | Mod: PO

## 2019-12-31 NOTE — TELEPHONE ENCOUNTER
Notify pt:    Arterial US negative for significant blockage to the legs      -Blood counts are normal  - prostate is normal  - vitamin D is low; start taking 2000 units over the counter daily  -Electrolytes, kidney and liver function are normal  -Thyroid function is normal  -Cholesterol panel is not at goal for a diabetic; recommend lcontinue zetia and strict low cholesterol diet  - diabetic marker improved to 9.8 but still not at goal; would recommend increase of januvia from 50 to 100 mg daily and continue glburide and metformin.

## 2020-01-02 NOTE — TELEPHONE ENCOUNTER
Pt returned call. Explained US and lab results. Pt verbalized understanding. He is requesting new rx for januvia with increased dosage to be sent to [facility] on [address].     Diets mailed to pt.

## 2020-01-20 ENCOUNTER — OFFICE VISIT (OUTPATIENT)
Dept: ORTHOPEDICS | Facility: CLINIC | Age: 60
End: 2020-01-20
Payer: COMMERCIAL

## 2020-01-20 ENCOUNTER — HOSPITAL ENCOUNTER (OUTPATIENT)
Dept: RADIOLOGY | Facility: HOSPITAL | Age: 60
Discharge: HOME | End: 2020-01-20
Attending: ORTHOPAEDIC SURGERY
Payer: COMMERCIAL

## 2020-01-20 VITALS — HEIGHT: 69 IN | BODY MASS INDEX: 27.99 KG/M2 | WEIGHT: 189 LBS

## 2020-01-20 DIAGNOSIS — S46.212A RUPTURE OF LEFT BICEPS TENDON, INITIAL ENCOUNTER: ICD-10-CM

## 2020-01-20 DIAGNOSIS — G89.29 CHRONIC LEFT SHOULDER PAIN: ICD-10-CM

## 2020-01-20 DIAGNOSIS — M25.512 CHRONIC LEFT SHOULDER PAIN: ICD-10-CM

## 2020-01-20 PROCEDURE — 99203 OFFICE O/P NEW LOW 30 MIN: CPT | Mod: S$GLB,,, | Performed by: ORTHOPAEDIC SURGERY

## 2020-01-20 PROCEDURE — 99203 PR OFFICE/OUTPT VISIT, NEW, LEVL III, 30-44 MIN: ICD-10-PCS | Mod: S$GLB,,, | Performed by: ORTHOPAEDIC SURGERY

## 2020-01-20 PROCEDURE — 73030 X-RAY EXAM OF SHOULDER: CPT | Mod: 26,LT,, | Performed by: RADIOLOGY

## 2020-01-20 PROCEDURE — 99999 PR PBB SHADOW E&M-EST. PATIENT-LVL III: ICD-10-PCS | Mod: PBBFAC,,, | Performed by: ORTHOPAEDIC SURGERY

## 2020-01-20 PROCEDURE — 99999 PR PBB SHADOW E&M-EST. PATIENT-LVL III: CPT | Mod: PBBFAC,,, | Performed by: ORTHOPAEDIC SURGERY

## 2020-01-20 PROCEDURE — 73030 X-RAY EXAM OF SHOULDER: CPT | Mod: TC,PN,LT

## 2020-01-20 PROCEDURE — 73030 XR SHOULDER COMPLETE 2 OR MORE VIEWS LEFT: ICD-10-PCS | Mod: 26,LT,, | Performed by: RADIOLOGY

## 2020-01-20 RX ORDER — DICLOFENAC SODIUM 10 MG/G
2 GEL TOPICAL 3 TIMES DAILY
Qty: 1 TUBE | Refills: 3 | Status: SHIPPED | OUTPATIENT
Start: 2020-01-20 | End: 2021-05-31

## 2020-01-20 RX ORDER — LOSARTAN POTASSIUM 25 MG/1
12.5 TABLET ORAL DAILY
Qty: 45 TABLET | Refills: 3 | Status: SHIPPED | OUTPATIENT
Start: 2020-01-20 | End: 2021-05-14 | Stop reason: SDUPTHER

## 2020-01-20 RX ORDER — GLYBURIDE 5 MG/1
10 TABLET ORAL 2 TIMES DAILY WITH MEALS
Qty: 360 TABLET | Refills: 1 | Status: SHIPPED | OUTPATIENT
Start: 2020-01-20 | End: 2020-03-30

## 2020-01-20 RX ORDER — EZETIMIBE 10 MG/1
10 TABLET ORAL DAILY
Qty: 90 TABLET | Refills: 3 | Status: SHIPPED | OUTPATIENT
Start: 2020-01-20 | End: 2021-07-29 | Stop reason: SDUPTHER

## 2020-01-20 RX ORDER — DICLOFENAC SODIUM 10 MG/G
2 GEL TOPICAL 3 TIMES DAILY
Qty: 1 TUBE | Refills: 3 | Status: SHIPPED | OUTPATIENT
Start: 2020-01-20 | End: 2022-06-13

## 2020-01-20 NOTE — TELEPHONE ENCOUNTER
----- Message from Kari Melgar sent at 1/20/2020  2:17 PM CST -----  Contact: self   Patient is calling for an RX refill or new RX.  Is this a refill or new RX:  new  RX name and strength: glyBURIDE (DIABETA) 5 MG tablet  Directions (copy/paste from chart):  TAKE 2 TABLETS (10 MG TOTAL) BY MOUTH 2 (TWO) TIMES DAILY WITH MEALS  Is this a 30 day or 90 day RX:    Local pharmacy or mail order pharmacy:  local  Pharmacy name and phone # (copy/paste from chart):   [facility] [address] [place], [place] [address] [facility] [phone] (Phone)  [phone] (Fax)  Comments:      Patient is calling for an RX refill or new RX.  Is this a refill or new RX:  new  RX name and strength: SITagliptin (JANUVIA) 100 MG Tab  Directions (copy/paste from chart):   Take 1 tablet (100 mg total) by mouth once daily  Is this a 30 day or 90 day RX:    Local pharmacy or mail order pharmacy:  local  Pharmacy name and phone # (copy/paste from chart):   [facility] [address] [place], [place] [address] [facility] [phone] (Phone)  [phone] (Fax)  Comments:      Patient is calling for an RX refill or new RX.  Is this a refill or new RX:  new  RX name and strength: ezetimibe (ZETIA) 10 mg tablet  Directions (copy/paste from chart):  Take 1 tablet (10 mg total) by mouth once daily  Is this a 30 day or 90 day RX:    Local pharmacy or mail order pharmacy:  local  Pharmacy name and phone # (copy/paste from chart):   [facility] [address] [place] [address] [facility] [phone] (Phone)  [phone] (Fax)    Patient is calling for an RX refill or new RX.  Is this a refill or new RX:  new  RX name and strength: losartan (COZAAR) 25 MG tablet  Directions (copy/paste from chart): Take 0.5 tablets (12.5 mg total) by mouth once daily    Is this a 30 day or 90 day RX:    Local pharmacy or mail order pharmacy:  local  Pharmacy name and phone # (copy/paste from chart):       [id number] -  [facility] FAJARDO - [address] [facility] [phone] (Phone)  [phone] (Fax)  Comments:

## 2020-01-20 NOTE — PROGRESS NOTES
Subjective:      Patient ID: Darrell Hinds Jr. is a 59 y.o. male.    Chief Complaint: Pain of the Left Arm      HPI  Darrell Hinds Jr. is a  59 y.o. male presenting today for left shoulder and elbow symptoms.  There was not a history of trauma.  Onset of symptoms began several years ago when he sustained a rupture of the long head of the biceps in the left shoulder  At that time he was treated non operatively and seemed to do well for a few years but recently his shoulder symptoms have gotten a little bit worse  He is reporting some pain and muscle spasms in the biceps muscle from time to time  He also has some weakness in the left arm which is got a little bit worse over the past year to no numbness or tingling is reported.      Review of patient's allergies indicates:   Allergen Reactions    Lipitor [atorvastatin]      Muscle pain         Current Outpatient Medications   Medication Sig Dispense Refill    blood sugar diagnostic Strp Check fasting glucose daily 100 strip 3    blood-glucose meter Misc Check glucose daily 1 each 0    lancets Misc Check glucose daily 100 each 3    metFORMIN (GLUCOPHAGE) 1000 MG tablet TAKE 1 TABLET BY MOUTH TWICE A  tablet 1    diclofenac sodium (VOLTAREN) 1 % Gel Apply 2 g topically 3 (three) times daily. 1 Tube 3    ezetimibe (ZETIA) 10 mg tablet Take 1 tablet (10 mg total) by mouth once daily. 90 tablet 3    glyBURIDE (DIABETA) 5 MG tablet Take 2 tablets (10 mg total) by mouth 2 (two) times daily with meals. 360 tablet 1    losartan (COZAAR) 25 MG tablet Take 0.5 tablets (12.5 mg total) by mouth once daily. 45 tablet 3    SITagliptin (JANUVIA) 100 MG Tab Take 1 tablet (100 mg total) by mouth once daily. 30 tablet 6     No current facility-administered medications for this visit.        Past Medical History:   Diagnosis Date    Cataract     Diabetes mellitus, type 2     Diabetic retinopathy     Encounter for cosmetic surgery     GERD (gastroesophageal reflux  "disease)     Hyperlipidemia     Hypertension     Hypertrophy of nasal turbinates     Kidney stone 2009    Nasal obstruction     Nasal septal deformity     Unspecified disorder of kidney and ureter        Past Surgical History:   Procedure Laterality Date    COSMETIC SURGERY      Rhinoplasty    EXCISION TURBINATE, SUBMUCOUS      HERNIA REPAIR      Umbilical    NASAL SEPTUM SURGERY         Review of Systems:  ROS    OBJECTIVE:     PHYSICAL EXAM:  Height: 5' 9" (175.3 cm) Weight: 85.7 kg (189 lb)  Vitals:    01/20/20 1542 01/20/20 1546   Weight: 85.7 kg (189 lb)    Height: 5' 9" (1.753 m)    PainSc:   9   9     Well developed, well nourished male in no acute distress  Alert and oriented x 3  HEENT- Normal exam  Lungs- Clear to auscultation  Heart- Regular rate and rhythm  Abdomen- Soft nontender  Extremity exam- examination left shoulder no tenderness no swelling he does have a little bit of tenderness over the AC joint left shoulder range of motion left shoulder full no impingement sign mildly positive  Left biceps demonstrates distal migration which is slightly tender although he has pretty good strength in the left biceps no tenderness around the distal insertion on the elbow    RADIOGRAPHS:  AP lateral x-ray left shoulder demonstrates some degenerative changes AC joint left shoulder  Comments: I have personally reviewed the imaging and I agree with the above radiologist's report.    ASSESSMENT/PLAN:     IMPRESSION:  1.  Left arm pain related to chronic biceps tear long head.  2.  Left shoulder AC arthritis 3.  Possible underlying rotator cuff pathology left shoulder    PLAN:  I explained the nature of the problem to the patient I also explained that there was really not possible to repair the biceps tendon this many years out from injury  However I think physical therapy could help in terms of symptoms and even strength in the left arm and shoulder  I prescribed some Voltaren gel for the AC joint and " biceps muscle and Advil or Motrin by mouth  If symptoms do not improve we may need to get an MRI scan of the left shoulder  Follow-up 4-6 weeks and physical therapy ordered in [facility]       - We talked at length about the anatomy and pathophysiology of   Encounter Diagnosis   Name Primary?    Rupture of left biceps tendon, initial encounter            Disclaimer: This note has been generated using voice-recognition software. There may be typographical errors that have been missed during proof-reading.

## 2020-01-20 NOTE — TELEPHONE ENCOUNTER
----- Message from Rubi Montes De Oca sent at 2020  1:31 PM CST -----  Contact: Patient [phone]  Insurance will not fill Rx SITagliptin (JANUVIA) 100 MG Tab.Please call insurance at [phone] to justify why the the patient is on this Rx. Insurance has  as 1960    Please call and advise.    Thank You

## 2020-02-28 ENCOUNTER — PATIENT MESSAGE (OUTPATIENT)
Dept: INTERNAL MEDICINE | Facility: CLINIC | Age: 60
End: 2020-02-28

## 2020-03-04 ENCOUNTER — PATIENT MESSAGE (OUTPATIENT)
Dept: ADMINISTRATIVE | Facility: OTHER | Age: 60
End: 2020-03-04

## 2020-03-10 ENCOUNTER — CLINICAL SUPPORT (OUTPATIENT)
Dept: CARDIOLOGY | Facility: CLINIC | Age: 60
End: 2020-03-10
Attending: INTERNAL MEDICINE
Payer: COMMERCIAL

## 2020-03-10 DIAGNOSIS — R06.09 DOE (DYSPNEA ON EXERTION): ICD-10-CM

## 2020-03-10 PROCEDURE — 93880 CV US DOPPLER CAROTID (CUPID ONLY): ICD-10-PCS | Mod: S$GLB,,, | Performed by: INTERNAL MEDICINE

## 2020-03-10 PROCEDURE — 93880 EXTRACRANIAL BILAT STUDY: CPT | Mod: S$GLB,,, | Performed by: INTERNAL MEDICINE

## 2020-03-11 LAB
LEFT ARM DIASTOLIC BLOOD PRESSURE: 80 MMHG
LEFT ARM SYSTOLIC BLOOD PRESSURE: 140 MMHG
LEFT CBA DIAS: 8 CM/S
LEFT CBA SYS: 41 CM/S
LEFT CCA DIST DIAS: 9 CM/S
LEFT CCA DIST SYS: 77 CM/S
LEFT CCA MID DIAS: 12 CM/S
LEFT CCA MID SYS: 86 CM/S
LEFT CCA PROX DIAS: 8 CM/S
LEFT CCA PROX SYS: 118 CM/S
LEFT ECA DIAS: 6 CM/S
LEFT ECA SYS: 177 CM/S
LEFT ICA DIST DIAS: 21 CM/S
LEFT ICA DIST SYS: 91 CM/S
LEFT ICA MID DIAS: 21 CM/S
LEFT ICA MID SYS: 76 CM/S
LEFT ICA PROX DIAS: 14 CM/S
LEFT ICA PROX SYS: 63 CM/S
LEFT VERTEBRAL DIAS: 11 CM/S
LEFT VERTEBRAL SYS: 47 CM/S
OHS CV CAROTID RIGHT ICA EDV HIGHEST: 26
OHS CV CAROTID ULTRASOUND LEFT ICA/CCA RATIO: 0.77
OHS CV CAROTID ULTRASOUND RIGHT ICA/CCA RATIO: 0.87
OHS CV PV CAROTID LEFT HIGHEST CCA: 118
OHS CV PV CAROTID LEFT HIGHEST ICA: 91
OHS CV PV CAROTID RIGHT HIGHEST CCA: 113
OHS CV PV CAROTID RIGHT HIGHEST ICA: 98
OHS CV US CAROTID LEFT HIGHEST EDV: 21
RIGHT ARM DIASTOLIC BLOOD PRESSURE: 80 MMHG
RIGHT ARM SYSTOLIC BLOOD PRESSURE: 140 MMHG
RIGHT CBA DIAS: 7 CM/S
RIGHT CBA SYS: 69 CM/S
RIGHT CCA DIST DIAS: 12 CM/S
RIGHT CCA DIST SYS: 97 CM/S
RIGHT CCA MID DIAS: 12 CM/S
RIGHT CCA MID SYS: 113 CM/S
RIGHT CCA PROX DIAS: 7 CM/S
RIGHT CCA PROX SYS: 104 CM/S
RIGHT ECA DIAS: 4 CM/S
RIGHT ECA SYS: 167 CM/S
RIGHT ICA DIST DIAS: 26 CM/S
RIGHT ICA DIST SYS: 98 CM/S
RIGHT ICA MID DIAS: 18 CM/S
RIGHT ICA MID SYS: 70 CM/S
RIGHT ICA PROX DIAS: 10 CM/S
RIGHT ICA PROX SYS: 47 CM/S
RIGHT VERTEBRAL DIAS: 11 CM/S
RIGHT VERTEBRAL SYS: 56 CM/S

## 2020-03-18 ENCOUNTER — TELEPHONE (OUTPATIENT)
Dept: INTERNAL MEDICINE | Facility: CLINIC | Age: 60
End: 2020-03-18

## 2020-03-18 DIAGNOSIS — E11.65 TYPE 2 DIABETES MELLITUS WITH HYPERGLYCEMIA, WITHOUT LONG-TERM CURRENT USE OF INSULIN: Primary | ICD-10-CM

## 2020-03-18 NOTE — TELEPHONE ENCOUNTER
----- Message from Franky Bee sent at 3/18/2020  1:08 PM CDT -----  Contact: Self [phone]  Patient has an upcoming lab appointment at the [facility] on 03/23    Please enter lab orders and link them to this appointment.    Thanks,  [facility] 5th floor reception desk

## 2020-03-23 ENCOUNTER — LAB VISIT (OUTPATIENT)
Dept: LAB | Facility: HOSPITAL | Age: 60
End: 2020-03-23
Attending: INTERNAL MEDICINE

## 2020-03-23 DIAGNOSIS — E11.65 TYPE 2 DIABETES MELLITUS WITH HYPERGLYCEMIA, WITHOUT LONG-TERM CURRENT USE OF INSULIN: ICD-10-CM

## 2020-03-23 LAB
ALBUMIN SERPL BCP-MCNC: 4.1 G/DL (ref 3.5–5.2)
ALP SERPL-CCNC: 68 U/L (ref 55–135)
ALT SERPL W/O P-5'-P-CCNC: 18 U/L (ref 10–44)
ANION GAP SERPL CALC-SCNC: 7 MMOL/L (ref 8–16)
AST SERPL-CCNC: 14 U/L (ref 10–40)
BILIRUB SERPL-MCNC: 0.4 MG/DL (ref 0.1–1)
BUN SERPL-MCNC: 13 MG/DL (ref 8–23)
CALCIUM SERPL-MCNC: 8.5 MG/DL (ref 8.7–10.5)
CHLORIDE SERPL-SCNC: 104 MMOL/L (ref 95–110)
CHOLEST SERPL-MCNC: 157 MG/DL (ref 120–199)
CHOLEST/HDLC SERPL: 5.1 {RATIO} (ref 2–5)
CO2 SERPL-SCNC: 25 MMOL/L (ref 23–29)
CREAT SERPL-MCNC: 0.9 MG/DL (ref 0.5–1.4)
EST. GFR  (AFRICAN AMERICAN): >60 ML/MIN/1.73 M^2
EST. GFR  (NON AFRICAN AMERICAN): >60 ML/MIN/1.73 M^2
ESTIMATED AVG GLUCOSE: 232 MG/DL (ref 68–131)
GLUCOSE SERPL-MCNC: 258 MG/DL (ref 70–110)
HBA1C MFR BLD HPLC: 9.7 % (ref 4–5.6)
HDLC SERPL-MCNC: 31 MG/DL (ref 40–75)
HDLC SERPL: 19.7 % (ref 20–50)
LDLC SERPL CALC-MCNC: 61.8 MG/DL (ref 63–159)
NONHDLC SERPL-MCNC: 126 MG/DL
POTASSIUM SERPL-SCNC: 4.4 MMOL/L (ref 3.5–5.1)
PROT SERPL-MCNC: 6.6 G/DL (ref 6–8.4)
SODIUM SERPL-SCNC: 136 MMOL/L (ref 136–145)
TRIGL SERPL-MCNC: 321 MG/DL (ref 30–150)

## 2020-03-23 PROCEDURE — 80053 COMPREHEN METABOLIC PANEL: CPT

## 2020-03-23 PROCEDURE — 36415 COLL VENOUS BLD VENIPUNCTURE: CPT | Mod: PO

## 2020-03-23 PROCEDURE — 83036 HEMOGLOBIN GLYCOSYLATED A1C: CPT

## 2020-03-23 PROCEDURE — 80061 LIPID PANEL: CPT

## 2020-03-27 NOTE — PROGRESS NOTES
Subjective:       Patient ID: Darrell Hinds Jr. is a 59 y.o. male.    Chief Complaint: No chief complaint on file.    Patient is a 59 y.o.male who presents today for follow up    The patient location is: home in [place]  The chief complaint leading to consultation is: lab review and med fill  Visit type: Virtual visit with synchronous audio and video  Total time spent with patient: 15 minutes  Each patient to whom he or she provides medical services by telemedicine is:  (1) informed of the relationship between the physician and patient and the respective role of any other health care provider with respect to management of the patient; and (2) notified that he or she may decline to receive medical services by telemedicine and may withdraw from such care at any time.    Notes:       Cholesterol: reviewed  Vaccines: Influenza (yearly); Tetanus (2015) ; Pneumovax (done); Zoster (done)  Eye exam: done  Colonoscopy: 2015; due in five years      Not following a strict low fat or diabetic diet  Review of Systems   Constitutional: Negative for appetite change, chills and diaphoresis.   HENT: Negative for congestion, dental problem, drooling, ear discharge, ear pain, hearing loss, mouth sores and tinnitus.    Eyes: Negative for discharge, redness and itching.   Respiratory: Negative for chest tightness, shortness of breath and wheezing.    Cardiovascular: Negative for chest pain, palpitations and leg swelling.   Gastrointestinal: Negative for abdominal pain, constipation, diarrhea, nausea and vomiting.   Endocrine: Negative for cold intolerance and heat intolerance.   Genitourinary: Negative for decreased urine volume, difficulty urinating, flank pain and hematuria.   Musculoskeletal: Negative for arthralgias, gait problem and myalgias.   Skin: Negative for color change and rash.   Allergic/Immunologic: Negative for environmental allergies.   Neurological: Negative for dizziness, syncope and headaches.   Hematological: Negative  for adenopathy.   Psychiatric/Behavioral: Negative for agitation, behavioral problems, confusion and sleep disturbance.       Objective:      Physical Exam   Constitutional: He is oriented to person, place, and time. He appears well-developed and well-nourished. No distress.   HENT:   Head: Normocephalic and atraumatic.   Right Ear: External ear normal.   Left Ear: External ear normal.   Eyes: Pupils are equal, round, and reactive to light. Conjunctivae are normal. Right eye exhibits no discharge. Left eye exhibits no discharge. No scleral icterus.   Neck: Normal range of motion. Neck supple.   Pulmonary/Chest: Effort normal. No stridor.   Neurological: He is alert and oriented to person, place, and time.   Skin: He is not diaphoretic.   Psychiatric: He has a normal mood and affect. His behavior is normal. Judgment and thought content normal.       Assessment and Plan:       1. Type 2 diabetes mellitus with hyperglycemia, without long-term current use of insulin  - no improvement in last three months  - stop glyburide  - start jardiance, continue metformin and januvia  - declines endo referral and injectable meds for now  - strict diabetic diet  - f/u in three months for DM    2. Statin intolerance      3. Essential hypertension  - stable at home on meds    4. Vitamin D deficiency  - otc supplements    5. Other hyperlipidemia  - strict low fat diet; declines fenofibrate for now; start exercising; continue zetia; statin intolerance          No follow-ups on file.

## 2020-03-30 ENCOUNTER — OFFICE VISIT (OUTPATIENT)
Dept: INTERNAL MEDICINE | Facility: CLINIC | Age: 60
End: 2020-03-30
Payer: COMMERCIAL

## 2020-03-30 DIAGNOSIS — E78.49 OTHER HYPERLIPIDEMIA: ICD-10-CM

## 2020-03-30 DIAGNOSIS — Z78.9 STATIN INTOLERANCE: ICD-10-CM

## 2020-03-30 DIAGNOSIS — I10 ESSENTIAL HYPERTENSION: ICD-10-CM

## 2020-03-30 DIAGNOSIS — E11.65 TYPE 2 DIABETES MELLITUS WITH HYPERGLYCEMIA, WITHOUT LONG-TERM CURRENT USE OF INSULIN: Primary | ICD-10-CM

## 2020-03-30 DIAGNOSIS — E55.9 VITAMIN D DEFICIENCY: ICD-10-CM

## 2020-03-30 PROCEDURE — 99214 PR OFFICE/OUTPT VISIT, EST, LEVL IV, 30-39 MIN: ICD-10-PCS | Mod: 95,,, | Performed by: INTERNAL MEDICINE

## 2020-03-30 PROCEDURE — 99214 OFFICE O/P EST MOD 30 MIN: CPT | Mod: 95,,, | Performed by: INTERNAL MEDICINE

## 2020-07-02 DIAGNOSIS — E11.9 TYPE 2 DIABETES MELLITUS WITHOUT COMPLICATION: ICD-10-CM

## 2020-07-06 RX ORDER — FLUORESCEIN 500 MG/ML
5 INJECTION INTRAVENOUS ONCE
Status: DISCONTINUED | OUTPATIENT
Start: 2020-07-06 | End: 2021-08-25

## 2020-07-08 ENCOUNTER — PATIENT OUTREACH (OUTPATIENT)
Dept: ADMINISTRATIVE | Facility: OTHER | Age: 60
End: 2020-07-08

## 2020-07-08 DIAGNOSIS — Z12.11 ENCOUNTER FOR FIT (FECAL IMMUNOCHEMICAL TEST) SCREENING: Primary | ICD-10-CM

## 2020-09-14 ENCOUNTER — PATIENT MESSAGE (OUTPATIENT)
Dept: INTERNAL MEDICINE | Facility: CLINIC | Age: 60
End: 2020-09-14

## 2020-09-14 RX ORDER — METFORMIN HYDROCHLORIDE 1000 MG/1
1000 TABLET ORAL 2 TIMES DAILY
Qty: 180 TABLET | Refills: 1 | Status: SHIPPED | OUTPATIENT
Start: 2020-09-14 | End: 2021-03-08

## 2020-10-05 ENCOUNTER — PATIENT MESSAGE (OUTPATIENT)
Dept: ADMINISTRATIVE | Facility: HOSPITAL | Age: 60
End: 2020-10-05

## 2020-10-14 ENCOUNTER — PATIENT MESSAGE (OUTPATIENT)
Dept: INTERNAL MEDICINE | Facility: CLINIC | Age: 60
End: 2020-10-14

## 2020-12-31 ENCOUNTER — PATIENT OUTREACH (OUTPATIENT)
Dept: ADMINISTRATIVE | Facility: HOSPITAL | Age: 60
End: 2020-12-31

## 2021-01-04 ENCOUNTER — PATIENT MESSAGE (OUTPATIENT)
Dept: ADMINISTRATIVE | Facility: HOSPITAL | Age: 61
End: 2021-01-04

## 2021-02-25 ENCOUNTER — TELEPHONE (OUTPATIENT)
Dept: INTERNAL MEDICINE | Facility: CLINIC | Age: 61
End: 2021-02-25

## 2021-02-25 DIAGNOSIS — E55.9 VITAMIN D DEFICIENCY: ICD-10-CM

## 2021-02-25 DIAGNOSIS — E78.49 OTHER HYPERLIPIDEMIA: ICD-10-CM

## 2021-02-25 DIAGNOSIS — E11.65 TYPE 2 DIABETES MELLITUS WITH HYPERGLYCEMIA, WITHOUT LONG-TERM CURRENT USE OF INSULIN: Primary | ICD-10-CM

## 2021-02-25 DIAGNOSIS — I10 ESSENTIAL HYPERTENSION: ICD-10-CM

## 2021-03-04 ENCOUNTER — PATIENT MESSAGE (OUTPATIENT)
Dept: INTERNAL MEDICINE | Facility: CLINIC | Age: 61
End: 2021-03-04

## 2021-03-16 ENCOUNTER — PATIENT MESSAGE (OUTPATIENT)
Dept: INTERNAL MEDICINE | Facility: CLINIC | Age: 61
End: 2021-03-16

## 2021-03-28 ENCOUNTER — PATIENT OUTREACH (OUTPATIENT)
Dept: ADMINISTRATIVE | Facility: OTHER | Age: 61
End: 2021-03-28

## 2021-03-29 ENCOUNTER — OFFICE VISIT (OUTPATIENT)
Dept: OPTOMETRY | Facility: CLINIC | Age: 61
End: 2021-03-29
Payer: COMMERCIAL

## 2021-03-29 DIAGNOSIS — H52.7 REFRACTIVE ERROR: ICD-10-CM

## 2021-03-29 DIAGNOSIS — E11.3393 MODERATE NONPROLIFERATIVE DIABETIC RETINOPATHY OF BOTH EYES WITHOUT MACULAR EDEMA ASSOCIATED WITH TYPE 2 DIABETES MELLITUS: Primary | ICD-10-CM

## 2021-03-29 PROCEDURE — 99999 PR PBB SHADOW E&M-EST. PATIENT-LVL III: ICD-10-PCS | Mod: PBBFAC,,, | Performed by: OPTOMETRIST

## 2021-03-29 PROCEDURE — 99999 PR PBB SHADOW E&M-EST. PATIENT-LVL III: CPT | Mod: PBBFAC,,, | Performed by: OPTOMETRIST

## 2021-03-29 PROCEDURE — 92015 DETERMINE REFRACTIVE STATE: CPT | Mod: S$GLB,,, | Performed by: OPTOMETRIST

## 2021-03-29 PROCEDURE — 92012 INTRM OPH EXAM EST PATIENT: CPT | Mod: S$GLB,,, | Performed by: OPTOMETRIST

## 2021-03-29 PROCEDURE — 92012 PR EYE EXAM, EST PATIENT,INTERMED: ICD-10-PCS | Mod: S$GLB,,, | Performed by: OPTOMETRIST

## 2021-03-29 PROCEDURE — 92015 PR REFRACTION: ICD-10-PCS | Mod: S$GLB,,, | Performed by: OPTOMETRIST

## 2021-04-05 ENCOUNTER — PATIENT MESSAGE (OUTPATIENT)
Dept: ADMINISTRATIVE | Facility: HOSPITAL | Age: 61
End: 2021-04-05

## 2021-04-21 RX ORDER — FLUORESCEIN 500 MG/ML
5 INJECTION INTRAVENOUS ONCE
Status: DISCONTINUED | OUTPATIENT
Start: 2021-04-21 | End: 2021-08-25

## 2021-05-04 ENCOUNTER — PATIENT OUTREACH (OUTPATIENT)
Dept: ADMINISTRATIVE | Facility: HOSPITAL | Age: 61
End: 2021-05-04

## 2021-05-04 ENCOUNTER — PATIENT MESSAGE (OUTPATIENT)
Dept: ADMINISTRATIVE | Facility: HOSPITAL | Age: 61
End: 2021-05-04

## 2021-05-04 ENCOUNTER — PATIENT MESSAGE (OUTPATIENT)
Dept: RESEARCH | Facility: HOSPITAL | Age: 61
End: 2021-05-04

## 2021-05-11 RX ORDER — EMPAGLIFLOZIN 25 MG/1
TABLET, FILM COATED ORAL
Qty: 90 TABLET | Refills: 0 | Status: SHIPPED | OUTPATIENT
Start: 2021-05-11 | End: 2021-06-14 | Stop reason: SDUPTHER

## 2021-05-14 RX ORDER — LOSARTAN POTASSIUM 25 MG/1
12.5 TABLET ORAL DAILY
Qty: 45 TABLET | Refills: 0 | Status: SHIPPED | OUTPATIENT
Start: 2021-05-14 | End: 2021-06-14 | Stop reason: SDUPTHER

## 2021-05-30 ENCOUNTER — PATIENT OUTREACH (OUTPATIENT)
Dept: ADMINISTRATIVE | Facility: OTHER | Age: 61
End: 2021-05-30

## 2021-05-31 ENCOUNTER — OFFICE VISIT (OUTPATIENT)
Dept: OPTOMETRY | Facility: CLINIC | Age: 61
End: 2021-05-31
Payer: COMMERCIAL

## 2021-05-31 DIAGNOSIS — H52.7 REFRACTIVE ERROR: Primary | ICD-10-CM

## 2021-05-31 PROCEDURE — 99999 PR PBB SHADOW E&M-EST. PATIENT-LVL III: ICD-10-PCS | Mod: PBBFAC,,, | Performed by: OPTOMETRIST

## 2021-05-31 PROCEDURE — 99499 UNLISTED E&M SERVICE: CPT | Mod: S$GLB,,, | Performed by: OPTOMETRIST

## 2021-05-31 PROCEDURE — 99499 NO LOS: ICD-10-PCS | Mod: S$GLB,,, | Performed by: OPTOMETRIST

## 2021-05-31 PROCEDURE — 99999 PR PBB SHADOW E&M-EST. PATIENT-LVL III: CPT | Mod: PBBFAC,,, | Performed by: OPTOMETRIST

## 2021-06-14 ENCOUNTER — PATIENT MESSAGE (OUTPATIENT)
Dept: INTERNAL MEDICINE | Facility: CLINIC | Age: 61
End: 2021-06-14

## 2021-06-14 DIAGNOSIS — E11.65 TYPE 2 DIABETES MELLITUS WITH HYPERGLYCEMIA, WITHOUT LONG-TERM CURRENT USE OF INSULIN: Primary | ICD-10-CM

## 2021-06-14 RX ORDER — EMPAGLIFLOZIN 25 MG/1
25 TABLET, FILM COATED ORAL DAILY
Qty: 90 TABLET | Refills: 0 | Status: SHIPPED | OUTPATIENT
Start: 2021-06-14 | End: 2021-07-29 | Stop reason: SDUPTHER

## 2021-06-14 RX ORDER — LOSARTAN POTASSIUM 25 MG/1
12.5 TABLET ORAL DAILY
Qty: 45 TABLET | Refills: 0 | Status: SHIPPED | OUTPATIENT
Start: 2021-06-14 | End: 2021-07-29 | Stop reason: SDUPTHER

## 2021-07-22 ENCOUNTER — LAB VISIT (OUTPATIENT)
Dept: LAB | Facility: HOSPITAL | Age: 61
End: 2021-07-22
Attending: INTERNAL MEDICINE
Payer: COMMERCIAL

## 2021-07-22 DIAGNOSIS — E11.9 TYPE 2 DIABETES MELLITUS WITHOUT COMPLICATION: ICD-10-CM

## 2021-07-22 DIAGNOSIS — I10 ESSENTIAL HYPERTENSION: ICD-10-CM

## 2021-07-22 DIAGNOSIS — E55.9 VITAMIN D DEFICIENCY: ICD-10-CM

## 2021-07-22 DIAGNOSIS — E78.49 OTHER HYPERLIPIDEMIA: ICD-10-CM

## 2021-07-22 DIAGNOSIS — E11.65 TYPE 2 DIABETES MELLITUS WITH HYPERGLYCEMIA, WITHOUT LONG-TERM CURRENT USE OF INSULIN: ICD-10-CM

## 2021-07-22 LAB
25(OH)D3+25(OH)D2 SERPL-MCNC: 23 NG/ML (ref 30–96)
ALBUMIN SERPL BCP-MCNC: 4.1 G/DL (ref 3.5–5.2)
ALP SERPL-CCNC: 67 U/L (ref 55–135)
ALT SERPL W/O P-5'-P-CCNC: 13 U/L (ref 10–44)
ANION GAP SERPL CALC-SCNC: 11 MMOL/L (ref 8–16)
AST SERPL-CCNC: 14 U/L (ref 10–40)
BASOPHILS # BLD AUTO: 0.03 K/UL (ref 0–0.2)
BASOPHILS NFR BLD: 0.6 % (ref 0–1.9)
BILIRUB SERPL-MCNC: 0.7 MG/DL (ref 0.1–1)
BUN SERPL-MCNC: 15 MG/DL (ref 8–23)
CALCIUM SERPL-MCNC: 9 MG/DL (ref 8.7–10.5)
CHLORIDE SERPL-SCNC: 108 MMOL/L (ref 95–110)
CHOLEST SERPL-MCNC: 184 MG/DL (ref 120–199)
CHOLEST/HDLC SERPL: 4.8 {RATIO} (ref 2–5)
CO2 SERPL-SCNC: 20 MMOL/L (ref 23–29)
COMPLEXED PSA SERPL-MCNC: 0.45 NG/ML (ref 0–4)
CREAT SERPL-MCNC: 0.9 MG/DL (ref 0.5–1.4)
DIFFERENTIAL METHOD: ABNORMAL
EOSINOPHIL # BLD AUTO: 0.1 K/UL (ref 0–0.5)
EOSINOPHIL NFR BLD: 1.4 % (ref 0–8)
ERYTHROCYTE [DISTWIDTH] IN BLOOD BY AUTOMATED COUNT: 13.2 % (ref 11.5–14.5)
EST. GFR  (AFRICAN AMERICAN): >60 ML/MIN/1.73 M^2
EST. GFR  (NON AFRICAN AMERICAN): >60 ML/MIN/1.73 M^2
GLUCOSE SERPL-MCNC: 172 MG/DL (ref 70–110)
HCT VFR BLD AUTO: 45.6 % (ref 40–54)
HDLC SERPL-MCNC: 38 MG/DL (ref 40–75)
HDLC SERPL: 20.7 % (ref 20–50)
HGB BLD-MCNC: 15 G/DL (ref 14–18)
IMM GRANULOCYTES # BLD AUTO: 0.02 K/UL (ref 0–0.04)
IMM GRANULOCYTES NFR BLD AUTO: 0.4 % (ref 0–0.5)
LDLC SERPL CALC-MCNC: 109.8 MG/DL (ref 63–159)
LYMPHOCYTES # BLD AUTO: 1.7 K/UL (ref 1–4.8)
LYMPHOCYTES NFR BLD: 32.7 % (ref 18–48)
MCH RBC QN AUTO: 31.6 PG (ref 27–31)
MCHC RBC AUTO-ENTMCNC: 32.9 G/DL (ref 32–36)
MCV RBC AUTO: 96 FL (ref 82–98)
MONOCYTES # BLD AUTO: 0.3 K/UL (ref 0.3–1)
MONOCYTES NFR BLD: 6 % (ref 4–15)
NEUTROPHILS # BLD AUTO: 3.1 K/UL (ref 1.8–7.7)
NEUTROPHILS NFR BLD: 58.9 % (ref 38–73)
NONHDLC SERPL-MCNC: 146 MG/DL
NRBC BLD-RTO: 0 /100 WBC
PLATELET # BLD AUTO: 208 K/UL (ref 150–450)
PMV BLD AUTO: 10 FL (ref 9.2–12.9)
POTASSIUM SERPL-SCNC: 4.5 MMOL/L (ref 3.5–5.1)
PROT SERPL-MCNC: 6.7 G/DL (ref 6–8.4)
RBC # BLD AUTO: 4.75 M/UL (ref 4.6–6.2)
SODIUM SERPL-SCNC: 139 MMOL/L (ref 136–145)
TRIGL SERPL-MCNC: 181 MG/DL (ref 30–150)
TSH SERPL DL<=0.005 MIU/L-ACNC: 1.31 UIU/ML (ref 0.4–4)
WBC # BLD AUTO: 5.17 K/UL (ref 3.9–12.7)

## 2021-07-22 PROCEDURE — 85025 COMPLETE CBC W/AUTO DIFF WBC: CPT | Performed by: INTERNAL MEDICINE

## 2021-07-22 PROCEDURE — 80053 COMPREHEN METABOLIC PANEL: CPT | Performed by: INTERNAL MEDICINE

## 2021-07-22 PROCEDURE — 84443 ASSAY THYROID STIM HORMONE: CPT | Performed by: INTERNAL MEDICINE

## 2021-07-22 PROCEDURE — 80061 LIPID PANEL: CPT | Performed by: INTERNAL MEDICINE

## 2021-07-22 PROCEDURE — 36415 COLL VENOUS BLD VENIPUNCTURE: CPT | Mod: PO | Performed by: INTERNAL MEDICINE

## 2021-07-22 PROCEDURE — 84153 ASSAY OF PSA TOTAL: CPT | Performed by: INTERNAL MEDICINE

## 2021-07-22 PROCEDURE — 83036 HEMOGLOBIN GLYCOSYLATED A1C: CPT | Performed by: INTERNAL MEDICINE

## 2021-07-22 PROCEDURE — 82306 VITAMIN D 25 HYDROXY: CPT | Performed by: INTERNAL MEDICINE

## 2021-07-23 LAB
ESTIMATED AVG GLUCOSE: 249 MG/DL (ref 68–131)
HBA1C MFR BLD: 10.3 % (ref 4–5.6)

## 2021-07-27 ENCOUNTER — LAB VISIT (OUTPATIENT)
Dept: LAB | Facility: HOSPITAL | Age: 61
End: 2021-07-27
Attending: INTERNAL MEDICINE
Payer: COMMERCIAL

## 2021-07-27 ENCOUNTER — OFFICE VISIT (OUTPATIENT)
Dept: INTERNAL MEDICINE | Facility: CLINIC | Age: 61
End: 2021-07-27
Payer: COMMERCIAL

## 2021-07-27 VITALS
TEMPERATURE: 97 F | BODY MASS INDEX: 24.88 KG/M2 | RESPIRATION RATE: 16 BRPM | WEIGHT: 168 LBS | HEIGHT: 69 IN | OXYGEN SATURATION: 98 % | DIASTOLIC BLOOD PRESSURE: 80 MMHG | HEART RATE: 78 BPM | SYSTOLIC BLOOD PRESSURE: 130 MMHG

## 2021-07-27 DIAGNOSIS — E11.65 TYPE 2 DIABETES MELLITUS WITH HYPERGLYCEMIA, WITHOUT LONG-TERM CURRENT USE OF INSULIN: ICD-10-CM

## 2021-07-27 DIAGNOSIS — I10 ESSENTIAL HYPERTENSION: ICD-10-CM

## 2021-07-27 DIAGNOSIS — E55.9 VITAMIN D DEFICIENCY: ICD-10-CM

## 2021-07-27 DIAGNOSIS — N52.9 ERECTILE DYSFUNCTION, UNSPECIFIED ERECTILE DYSFUNCTION TYPE: ICD-10-CM

## 2021-07-27 DIAGNOSIS — E78.49 OTHER HYPERLIPIDEMIA: ICD-10-CM

## 2021-07-27 DIAGNOSIS — Z00.00 ANNUAL PHYSICAL EXAM: Primary | ICD-10-CM

## 2021-07-27 DIAGNOSIS — Z12.11 SCREEN FOR COLON CANCER: ICD-10-CM

## 2021-07-27 DIAGNOSIS — R63.4 WEIGHT LOSS: ICD-10-CM

## 2021-07-27 DIAGNOSIS — R10.9 ABDOMINAL PAIN, UNSPECIFIED ABDOMINAL LOCATION: ICD-10-CM

## 2021-07-27 PROCEDURE — 36415 COLL VENOUS BLD VENIPUNCTURE: CPT | Mod: PO | Performed by: INTERNAL MEDICINE

## 2021-07-27 PROCEDURE — 99396 PR PREVENTIVE VISIT,EST,40-64: ICD-10-PCS | Mod: S$GLB,,, | Performed by: INTERNAL MEDICINE

## 2021-07-27 PROCEDURE — 84402 ASSAY OF FREE TESTOSTERONE: CPT | Performed by: INTERNAL MEDICINE

## 2021-07-27 PROCEDURE — 99999 PR PBB SHADOW E&M-EST. PATIENT-LVL V: CPT | Mod: PBBFAC,,, | Performed by: INTERNAL MEDICINE

## 2021-07-27 PROCEDURE — 99396 PREV VISIT EST AGE 40-64: CPT | Mod: S$GLB,,, | Performed by: INTERNAL MEDICINE

## 2021-07-27 PROCEDURE — 99999 PR PBB SHADOW E&M-EST. PATIENT-LVL V: ICD-10-PCS | Mod: PBBFAC,,, | Performed by: INTERNAL MEDICINE

## 2021-07-27 RX ORDER — ORAL SEMAGLUTIDE 3 MG/1
3 TABLET ORAL DAILY
Qty: 30 TABLET | Refills: 11 | Status: SHIPPED | OUTPATIENT
Start: 2021-07-27 | End: 2021-07-29 | Stop reason: SDUPTHER

## 2021-07-28 LAB
TESTOST FREE SERPL-MCNC: 5.3 PG/ML
TESTOST FREE SERPL-MCNC: 5.3 PG/ML

## 2021-07-29 ENCOUNTER — TELEPHONE (OUTPATIENT)
Dept: INTERNAL MEDICINE | Facility: CLINIC | Age: 61
End: 2021-07-29

## 2021-07-29 ENCOUNTER — OFFICE VISIT (OUTPATIENT)
Dept: INTERNAL MEDICINE | Facility: CLINIC | Age: 61
End: 2021-07-29
Payer: COMMERCIAL

## 2021-07-29 DIAGNOSIS — Z78.9 STATIN INTOLERANCE: ICD-10-CM

## 2021-07-29 DIAGNOSIS — I10 ESSENTIAL HYPERTENSION: ICD-10-CM

## 2021-07-29 DIAGNOSIS — R53.83 FATIGUE, UNSPECIFIED TYPE: Primary | ICD-10-CM

## 2021-07-29 DIAGNOSIS — E11.65 TYPE 2 DIABETES MELLITUS WITH HYPERGLYCEMIA, WITHOUT LONG-TERM CURRENT USE OF INSULIN: Primary | ICD-10-CM

## 2021-07-29 DIAGNOSIS — E78.49 OTHER HYPERLIPIDEMIA: ICD-10-CM

## 2021-07-29 DIAGNOSIS — H35.033 HYPERTENSIVE RETINOPATHY, BILATERAL: ICD-10-CM

## 2021-07-29 LAB — GLUCOSE SERPL-MCNC: 316 MG/DL (ref 70–110)

## 2021-07-29 PROCEDURE — 99999 PR PBB SHADOW E&M-EST. PATIENT-LVL V: ICD-10-PCS | Mod: PBBFAC,,, | Performed by: NURSE PRACTITIONER

## 2021-07-29 PROCEDURE — 99999 PR PBB SHADOW E&M-EST. PATIENT-LVL V: CPT | Mod: PBBFAC,,, | Performed by: NURSE PRACTITIONER

## 2021-07-29 PROCEDURE — 82962 GLUCOSE BLOOD TEST: CPT | Mod: S$GLB,,, | Performed by: NURSE PRACTITIONER

## 2021-07-29 PROCEDURE — 99215 PR OFFICE/OUTPT VISIT, EST, LEVL V, 40-54 MIN: ICD-10-PCS | Mod: S$GLB,,, | Performed by: NURSE PRACTITIONER

## 2021-07-29 PROCEDURE — 99215 OFFICE O/P EST HI 40 MIN: CPT | Mod: S$GLB,,, | Performed by: NURSE PRACTITIONER

## 2021-07-29 PROCEDURE — 82962 POCT GLUCOSE, HAND-HELD DEVICE: ICD-10-PCS | Mod: S$GLB,,, | Performed by: NURSE PRACTITIONER

## 2021-07-29 RX ORDER — ORAL SEMAGLUTIDE 3 MG/1
3 TABLET ORAL DAILY
Qty: 30 TABLET | Refills: 0 | Status: SHIPPED | OUTPATIENT
Start: 2021-07-29 | End: 2021-09-02 | Stop reason: SDUPTHER

## 2021-07-29 RX ORDER — FLASH GLUCOSE SENSOR
1 KIT MISCELLANEOUS
Qty: 2 KIT | Refills: 11 | Status: SHIPPED | OUTPATIENT
Start: 2021-07-29 | End: 2021-08-26

## 2021-07-29 RX ORDER — EMPAGLIFLOZIN 25 MG/1
25 TABLET, FILM COATED ORAL DAILY
Qty: 90 TABLET | Refills: 1 | Status: SHIPPED | OUTPATIENT
Start: 2021-07-29 | End: 2021-10-02

## 2021-07-29 RX ORDER — FLASH GLUCOSE SENSOR
1 KIT MISCELLANEOUS
Qty: 2 KIT | Refills: 11 | Status: SHIPPED | OUTPATIENT
Start: 2021-07-29 | End: 2021-07-29 | Stop reason: SDUPTHER

## 2021-07-29 RX ORDER — LOSARTAN POTASSIUM 25 MG/1
12.5 TABLET ORAL DAILY
Qty: 45 TABLET | Refills: 0 | Status: SHIPPED | OUTPATIENT
Start: 2021-07-29 | End: 2021-08-25 | Stop reason: DRUGHIGH

## 2021-07-29 RX ORDER — EZETIMIBE 10 MG/1
10 TABLET ORAL DAILY
Qty: 90 TABLET | Refills: 3 | Status: ON HOLD | OUTPATIENT
Start: 2021-07-29 | End: 2022-02-04 | Stop reason: CLARIF

## 2021-07-29 NOTE — TELEPHONE ENCOUNTER
Notify pt that only his free testosterone was checked; we also need his total testosterone; lab may have forgotten to add it. Can we reschedule this lab . Ok to do next time he does lab unless he wants to go ahead and do it now

## 2021-07-30 ENCOUNTER — HOSPITAL ENCOUNTER (OUTPATIENT)
Dept: RADIOLOGY | Facility: HOSPITAL | Age: 61
Discharge: HOME | End: 2021-07-30
Attending: INTERNAL MEDICINE
Payer: COMMERCIAL

## 2021-07-30 ENCOUNTER — TELEPHONE (OUTPATIENT)
Dept: INTERNAL MEDICINE | Facility: CLINIC | Age: 61
End: 2021-07-30

## 2021-07-30 ENCOUNTER — TELEPHONE (OUTPATIENT)
Dept: PHARMACY | Facility: CLINIC | Age: 61
End: 2021-07-30

## 2021-07-30 VITALS
HEIGHT: 69 IN | TEMPERATURE: 98 F | HEART RATE: 90 BPM | RESPIRATION RATE: 16 BRPM | OXYGEN SATURATION: 97 % | SYSTOLIC BLOOD PRESSURE: 132 MMHG | BODY MASS INDEX: 25.27 KG/M2 | WEIGHT: 170.63 LBS | DIASTOLIC BLOOD PRESSURE: 78 MMHG

## 2021-07-30 DIAGNOSIS — I70.0 AORTIC ATHEROSCLEROSIS: Primary | ICD-10-CM

## 2021-07-30 DIAGNOSIS — R10.9 ABDOMINAL PAIN, UNSPECIFIED ABDOMINAL LOCATION: ICD-10-CM

## 2021-07-30 PROCEDURE — 74176 CT ABD & PELVIS W/O CONTRAST: CPT | Mod: 26,,, | Performed by: RADIOLOGY

## 2021-07-30 PROCEDURE — 25500020 PHARM REV CODE 255: Performed by: INTERNAL MEDICINE

## 2021-07-30 PROCEDURE — 71250 CT THORAX DX C-: CPT | Mod: 26,,, | Performed by: RADIOLOGY

## 2021-07-30 PROCEDURE — 74176 CT ABDOMEN PELVIS WITHOUT CONTRAST: ICD-10-PCS | Mod: 26,,, | Performed by: RADIOLOGY

## 2021-07-30 PROCEDURE — A9698 NON-RAD CONTRAST MATERIALNOC: HCPCS | Performed by: INTERNAL MEDICINE

## 2021-07-30 PROCEDURE — 71250 CT THORAX DX C-: CPT | Mod: TC

## 2021-07-30 PROCEDURE — 71250 CT CHEST WITHOUT CONTRAST: ICD-10-PCS | Mod: 26,,, | Performed by: RADIOLOGY

## 2021-07-30 PROCEDURE — 74176 CT ABD & PELVIS W/O CONTRAST: CPT | Mod: TC

## 2021-07-30 RX ADMIN — IOHEXOL 1000 ML: 9 SOLUTION ORAL at 07:07

## 2021-08-02 ENCOUNTER — PATIENT OUTREACH (OUTPATIENT)
Dept: ADMINISTRATIVE | Facility: OTHER | Age: 61
End: 2021-08-02

## 2021-08-02 ENCOUNTER — TELEPHONE (OUTPATIENT)
Dept: PHARMACY | Facility: CLINIC | Age: 61
End: 2021-08-02

## 2021-08-04 ENCOUNTER — PATIENT OUTREACH (OUTPATIENT)
Dept: DIABETES | Facility: CLINIC | Age: 61
End: 2021-08-04

## 2021-08-04 ENCOUNTER — PATIENT MESSAGE (OUTPATIENT)
Dept: INTERNAL MEDICINE | Facility: CLINIC | Age: 61
End: 2021-08-04

## 2021-08-04 ENCOUNTER — TELEPHONE (OUTPATIENT)
Dept: INTERNAL MEDICINE | Facility: CLINIC | Age: 61
End: 2021-08-04

## 2021-08-16 ENCOUNTER — OFFICE VISIT (OUTPATIENT)
Dept: GASTROENTEROLOGY | Facility: CLINIC | Age: 61
End: 2021-08-16
Payer: COMMERCIAL

## 2021-08-16 VITALS — BODY MASS INDEX: 24.23 KG/M2 | HEIGHT: 69 IN | WEIGHT: 163.56 LBS

## 2021-08-16 DIAGNOSIS — Z12.11 SCREEN FOR COLON CANCER: ICD-10-CM

## 2021-08-16 DIAGNOSIS — R63.4 WEIGHT LOSS: Primary | ICD-10-CM

## 2021-08-16 DIAGNOSIS — Z01.812 PRE-PROCEDURE LAB EXAM: ICD-10-CM

## 2021-08-16 PROCEDURE — 99999 PR PBB SHADOW E&M-EST. PATIENT-LVL III: ICD-10-PCS | Mod: PBBFAC,,, | Performed by: INTERNAL MEDICINE

## 2021-08-16 PROCEDURE — 99204 OFFICE O/P NEW MOD 45 MIN: CPT | Mod: S$GLB,,, | Performed by: INTERNAL MEDICINE

## 2021-08-16 PROCEDURE — 99999 PR PBB SHADOW E&M-EST. PATIENT-LVL III: CPT | Mod: PBBFAC,,, | Performed by: INTERNAL MEDICINE

## 2021-08-16 PROCEDURE — 99204 PR OFFICE/OUTPT VISIT, NEW, LEVL IV, 45-59 MIN: ICD-10-PCS | Mod: S$GLB,,, | Performed by: INTERNAL MEDICINE

## 2021-08-16 RX ORDER — POLYETHYLENE GLYCOL 3350, SODIUM SULFATE ANHYDROUS, SODIUM BICARBONATE, SODIUM CHLORIDE, POTASSIUM CHLORIDE 236; 22.74; 6.74; 5.86; 2.97 G/4L; G/4L; G/4L; G/4L; G/4L
4 POWDER, FOR SOLUTION ORAL ONCE
Qty: 1 BOTTLE | Refills: 0 | Status: SHIPPED | OUTPATIENT
Start: 2021-08-16 | End: 2021-08-16

## 2021-08-16 RX ORDER — SODIUM, POTASSIUM,MAG SULFATES 17.5-3.13G
1 SOLUTION, RECONSTITUTED, ORAL ORAL DAILY
Qty: 1 KIT | Refills: 0 | Status: SHIPPED | OUTPATIENT
Start: 2021-08-16 | End: 2021-08-18

## 2021-08-23 ENCOUNTER — PATIENT MESSAGE (OUTPATIENT)
Dept: ENDOSCOPY | Facility: HOSPITAL | Age: 61
End: 2021-08-23

## 2021-08-23 ENCOUNTER — TELEPHONE (OUTPATIENT)
Dept: ENDOSCOPY | Facility: HOSPITAL | Age: 61
End: 2021-08-23

## 2021-08-25 ENCOUNTER — OFFICE VISIT (OUTPATIENT)
Dept: CARDIOLOGY | Facility: CLINIC | Age: 61
End: 2021-08-25
Payer: COMMERCIAL

## 2021-08-25 ENCOUNTER — TELEPHONE (OUTPATIENT)
Dept: CARDIOLOGY | Facility: CLINIC | Age: 61
End: 2021-08-25

## 2021-08-25 VITALS
HEART RATE: 85 BPM | BODY MASS INDEX: 24.54 KG/M2 | WEIGHT: 165.69 LBS | DIASTOLIC BLOOD PRESSURE: 74 MMHG | SYSTOLIC BLOOD PRESSURE: 141 MMHG | HEIGHT: 69 IN

## 2021-08-25 DIAGNOSIS — I70.0 AORTIC ATHEROSCLEROSIS: ICD-10-CM

## 2021-08-25 DIAGNOSIS — E78.49 OTHER HYPERLIPIDEMIA: Primary | ICD-10-CM

## 2021-08-25 DIAGNOSIS — I10 ESSENTIAL HYPERTENSION: Primary | ICD-10-CM

## 2021-08-25 DIAGNOSIS — E11.65 TYPE 2 DIABETES MELLITUS WITH HYPERGLYCEMIA, WITHOUT LONG-TERM CURRENT USE OF INSULIN: Chronic | ICD-10-CM

## 2021-08-25 DIAGNOSIS — Z78.9 STATIN INTOLERANCE: ICD-10-CM

## 2021-08-25 DIAGNOSIS — I10 ESSENTIAL HYPERTENSION: ICD-10-CM

## 2021-08-25 DIAGNOSIS — E78.49 OTHER HYPERLIPIDEMIA: ICD-10-CM

## 2021-08-25 PROCEDURE — 99999 PR PBB SHADOW E&M-EST. PATIENT-LVL IV: ICD-10-PCS | Mod: PBBFAC,,, | Performed by: INTERNAL MEDICINE

## 2021-08-25 PROCEDURE — 93000 ELECTROCARDIOGRAM COMPLETE: CPT | Mod: S$GLB,,, | Performed by: INTERNAL MEDICINE

## 2021-08-25 PROCEDURE — 99999 PR PBB SHADOW E&M-EST. PATIENT-LVL IV: CPT | Mod: PBBFAC,,, | Performed by: INTERNAL MEDICINE

## 2021-08-25 PROCEDURE — 93000 EKG 12-LEAD: ICD-10-PCS | Mod: S$GLB,,, | Performed by: INTERNAL MEDICINE

## 2021-08-25 PROCEDURE — 99204 OFFICE O/P NEW MOD 45 MIN: CPT | Mod: S$GLB,,, | Performed by: INTERNAL MEDICINE

## 2021-08-25 PROCEDURE — 99204 PR OFFICE/OUTPT VISIT, NEW, LEVL IV, 45-59 MIN: ICD-10-PCS | Mod: S$GLB,,, | Performed by: INTERNAL MEDICINE

## 2021-08-25 RX ORDER — LOSARTAN POTASSIUM 25 MG/1
25 TABLET ORAL DAILY
COMMUNITY
End: 2021-08-25 | Stop reason: SDUPTHER

## 2021-08-25 RX ORDER — EVOLOCUMAB 140 MG/ML
140 INJECTION, SOLUTION SUBCUTANEOUS
Qty: 6 SYRINGE | Refills: 3 | OUTPATIENT
Start: 2021-08-25 | End: 2021-09-02

## 2021-08-25 RX ORDER — EVOLOCUMAB 140 MG/ML
140 INJECTION, SOLUTION SUBCUTANEOUS
COMMUNITY
End: 2021-08-25 | Stop reason: SDUPTHER

## 2021-08-25 RX ORDER — ASPIRIN 81 MG/1
81 TABLET ORAL DAILY
COMMUNITY

## 2021-08-25 RX ORDER — LOSARTAN POTASSIUM 25 MG/1
25 TABLET ORAL DAILY
Qty: 90 TABLET | Refills: 3 | Status: SHIPPED | OUTPATIENT
Start: 2021-08-25 | End: 2022-07-26

## 2021-08-26 ENCOUNTER — SPECIALTY PHARMACY (OUTPATIENT)
Dept: PHARMACY | Facility: CLINIC | Age: 61
End: 2021-08-26

## 2021-09-02 RX ORDER — ALIROCUMAB 150 MG/ML
150 INJECTION, SOLUTION SUBCUTANEOUS
Qty: 8 ML | Refills: 6 | Status: SHIPPED | OUTPATIENT
Start: 2021-09-02 | End: 2022-02-15 | Stop reason: ALTCHOICE

## 2021-09-03 ENCOUNTER — TELEPHONE (OUTPATIENT)
Dept: ENDOSCOPY | Facility: HOSPITAL | Age: 61
End: 2021-09-03

## 2021-09-03 ENCOUNTER — SPECIALTY PHARMACY (OUTPATIENT)
Dept: PHARMACY | Facility: CLINIC | Age: 61
End: 2021-09-03

## 2021-09-03 RX ORDER — ORAL SEMAGLUTIDE 3 MG/1
3 TABLET ORAL DAILY
Qty: 30 TABLET | Refills: 0 | Status: SHIPPED | OUTPATIENT
Start: 2021-09-03 | End: 2021-09-09

## 2021-09-09 ENCOUNTER — OFFICE VISIT (OUTPATIENT)
Dept: INTERNAL MEDICINE | Facility: CLINIC | Age: 61
End: 2021-09-09
Payer: COMMERCIAL

## 2021-09-09 DIAGNOSIS — I10 ESSENTIAL HYPERTENSION: ICD-10-CM

## 2021-09-09 DIAGNOSIS — Z78.9 STATIN INTOLERANCE: ICD-10-CM

## 2021-09-09 DIAGNOSIS — E78.5 HYPERLIPIDEMIA ASSOCIATED WITH TYPE 2 DIABETES MELLITUS: ICD-10-CM

## 2021-09-09 DIAGNOSIS — E11.69 HYPERLIPIDEMIA ASSOCIATED WITH TYPE 2 DIABETES MELLITUS: ICD-10-CM

## 2021-09-09 DIAGNOSIS — E11.65 TYPE 2 DIABETES MELLITUS WITH HYPERGLYCEMIA, WITHOUT LONG-TERM CURRENT USE OF INSULIN: Primary | ICD-10-CM

## 2021-09-09 PROBLEM — E78.49 OTHER HYPERLIPIDEMIA: Status: RESOLVED | Noted: 2018-12-05 | Resolved: 2021-09-09

## 2021-09-09 PROCEDURE — 99214 OFFICE O/P EST MOD 30 MIN: CPT | Mod: 95,,, | Performed by: NURSE PRACTITIONER

## 2021-09-09 PROCEDURE — 99214 PR OFFICE/OUTPT VISIT, EST, LEVL IV, 30-39 MIN: ICD-10-PCS | Mod: 95,,, | Performed by: NURSE PRACTITIONER

## 2021-09-09 RX ORDER — ORAL SEMAGLUTIDE 7 MG/1
7 TABLET ORAL DAILY
Qty: 30 TABLET | Refills: 3 | Status: SHIPPED | OUTPATIENT
Start: 2021-09-09 | End: 2022-02-01 | Stop reason: SDUPTHER

## 2021-09-10 ENCOUNTER — TELEPHONE (OUTPATIENT)
Dept: PRIMARY CARE CLINIC | Facility: CLINIC | Age: 61
End: 2021-09-10

## 2021-09-10 ENCOUNTER — TELEPHONE (OUTPATIENT)
Dept: ENDOSCOPY | Facility: HOSPITAL | Age: 61
End: 2021-09-10

## 2021-09-15 ENCOUNTER — HOSPITAL ENCOUNTER (OUTPATIENT)
Dept: CARDIOLOGY | Facility: HOSPITAL | Age: 61
Discharge: HOME | End: 2021-09-15
Attending: INTERNAL MEDICINE
Payer: COMMERCIAL

## 2021-09-15 VITALS — WEIGHT: 165 LBS | HEIGHT: 69 IN | BODY MASS INDEX: 24.44 KG/M2

## 2021-09-15 DIAGNOSIS — E78.49 OTHER HYPERLIPIDEMIA: ICD-10-CM

## 2021-09-15 DIAGNOSIS — Z78.9 STATIN INTOLERANCE: ICD-10-CM

## 2021-09-15 DIAGNOSIS — I70.0 AORTIC ATHEROSCLEROSIS: ICD-10-CM

## 2021-09-15 DIAGNOSIS — I10 ESSENTIAL HYPERTENSION: ICD-10-CM

## 2021-09-15 LAB
ASCENDING AORTA: 3.72 CM
BSA FOR ECHO PROCEDURE: 1.91 M2
CV ECHO LV RWT: 0.4 CM
CV STRESS BASE HR: 78 BPM
DIASTOLIC BLOOD PRESSURE: 66 MMHG
DOP CALC LVOT AREA: 5.4 CM2
DOP CALC LVOT DIAMETER: 2.62 CM
DOP CALC LVOT PEAK VEL: 0.92 M/S
DOP CALC LVOT STROKE VOLUME: 99.26 CM3
DOP CALCLVOT PEAK VEL VTI: 18.42 CM
E WAVE DECELERATION TIME: 348.68 MSEC
E/A RATIO: 0.91
E/E' RATIO: 7.38 M/S
ECHO LV POSTERIOR WALL: 0.9 CM (ref 0.6–1.1)
EJECTION FRACTION: 60 %
FRACTIONAL SHORTENING: 29 % (ref 28–44)
INTERVENTRICULAR SEPTUM: 1.14 CM (ref 0.6–1.1)
IVRT: 114.18 MSEC
LA MAJOR: 5.29 CM
LA MINOR: 5.42 CM
LA WIDTH: 3.37 CM
LEFT ATRIUM SIZE: 3.92 CM
LEFT ATRIUM VOLUME INDEX: 31.6 ML/M2
LEFT ATRIUM VOLUME: 60.12 CM3
LEFT INTERNAL DIMENSION IN SYSTOLE: 3.22 CM (ref 2.1–4)
LEFT VENTRICLE DIASTOLIC VOLUME INDEX: 49.31 ML/M2
LEFT VENTRICLE DIASTOLIC VOLUME: 93.68 ML
LEFT VENTRICLE MASS INDEX: 84 G/M2
LEFT VENTRICLE SYSTOLIC VOLUME INDEX: 21.8 ML/M2
LEFT VENTRICLE SYSTOLIC VOLUME: 41.49 ML
LEFT VENTRICULAR INTERNAL DIMENSION IN DIASTOLE: 4.53 CM (ref 3.5–6)
LEFT VENTRICULAR MASS: 159.21 G
LV LATERAL E/E' RATIO: 5.33 M/S
LV SEPTAL E/E' RATIO: 12 M/S
MV A" WAVE DURATION": 9.99 MSEC
MV PEAK A VEL: 0.53 M/S
MV PEAK E VEL: 0.48 M/S
MV STENOSIS PRESSURE HALF TIME: 101.12 MS
MV VALVE AREA P 1/2 METHOD: 2.18 CM2
OHS CV CPX 1 MINUTE RECOVERY HEART RATE: 133 BPM
OHS CV CPX 85 PERCENT MAX PREDICTED HEART RATE MALE: 133
OHS CV CPX ESTIMATED METS: 11
OHS CV CPX MAX PREDICTED HEART RATE: 157
OHS CV CPX PATIENT IS FEMALE: 0
OHS CV CPX PATIENT IS MALE: 1
OHS CV CPX PEAK DIASTOLIC BLOOD PRESSURE: 82 MMHG
OHS CV CPX PEAK HEAR RATE: 153 BPM
OHS CV CPX PEAK RATE PRESSURE PRODUCT: ABNORMAL
OHS CV CPX PEAK SYSTOLIC BLOOD PRESSURE: 194 MMHG
OHS CV CPX PERCENT MAX PREDICTED HEART RATE ACHIEVED: 97
OHS CV CPX RATE PRESSURE PRODUCT PRESENTING: 8892
PISA TR MAX VEL: 2.12 M/S
PULM VEIN S/D RATIO: 2.09
PV PEAK D VEL: 0.32 M/S
PV PEAK S VEL: 0.67 M/S
RA MAJOR: 5.33 CM
RA PRESSURE: 3 MMHG
RA WIDTH: 3.26 CM
RIGHT VENTRICULAR END-DIASTOLIC DIMENSION: 3.35 CM
RV TISSUE DOPPLER FREE WALL SYSTOLIC VELOCITY 1 (APICAL 4 CHAMBER VIEW): 11.83 CM/S
SINUS: 3.8 CM
STJ: 3.07 CM
STRESS ECHO POST EXERCISE DUR MIN: 7 MINUTES
STRESS ECHO POST EXERCISE DUR SEC: 1 SECONDS
STRESS ST DEPRESSION: 1.5 MM
SYSTOLIC BLOOD PRESSURE: 114 MMHG
TDI LATERAL: 0.09 M/S
TDI SEPTAL: 0.04 M/S
TDI: 0.07 M/S
TR MAX PG: 18 MMHG
TRICUSPID ANNULAR PLANE SYSTOLIC EXCURSION: 2.02 CM
TV REST PULMONARY ARTERY PRESSURE: 21 MMHG

## 2021-09-15 PROCEDURE — 93351 STRESS TTE COMPLETE: CPT

## 2021-09-15 PROCEDURE — 93351 STRESS ECHO (CUPID ONLY): ICD-10-PCS | Mod: 26,,, | Performed by: INTERNAL MEDICINE

## 2021-09-15 PROCEDURE — 93351 STRESS TTE COMPLETE: CPT | Mod: 26,,, | Performed by: INTERNAL MEDICINE

## 2021-09-16 ENCOUNTER — TELEPHONE (OUTPATIENT)
Dept: CARDIOLOGY | Facility: CLINIC | Age: 61
End: 2021-09-16

## 2021-09-16 DIAGNOSIS — E78.49 OTHER HYPERLIPIDEMIA: Primary | ICD-10-CM

## 2021-09-16 DIAGNOSIS — I20.89 STABLE ANGINA PECTORIS: Primary | ICD-10-CM

## 2021-09-16 DIAGNOSIS — I10 ESSENTIAL HYPERTENSION: ICD-10-CM

## 2021-09-16 RX ORDER — METOPROLOL SUCCINATE 25 MG/1
25 TABLET, EXTENDED RELEASE ORAL DAILY
COMMUNITY
End: 2021-09-16 | Stop reason: SDUPTHER

## 2021-09-16 RX ORDER — METOPROLOL SUCCINATE 25 MG/1
25 TABLET, EXTENDED RELEASE ORAL DAILY
Qty: 30 TABLET | Refills: 6 | Status: SHIPPED | OUTPATIENT
Start: 2021-09-16 | End: 2022-05-04

## 2021-09-20 ENCOUNTER — PATIENT MESSAGE (OUTPATIENT)
Dept: ENDOSCOPY | Facility: HOSPITAL | Age: 61
End: 2021-09-20

## 2021-09-21 DIAGNOSIS — Z01.818 PRE-OP TESTING: Primary | ICD-10-CM

## 2021-09-22 ENCOUNTER — LAB VISIT (OUTPATIENT)
Dept: LAB | Facility: HOSPITAL | Age: 61
End: 2021-09-22
Attending: INTERNAL MEDICINE
Payer: COMMERCIAL

## 2021-09-22 ENCOUNTER — SPECIALTY PHARMACY (OUTPATIENT)
Dept: PHARMACY | Facility: CLINIC | Age: 61
End: 2021-09-22

## 2021-09-22 ENCOUNTER — PATIENT OUTREACH (OUTPATIENT)
Dept: ADMINISTRATIVE | Facility: OTHER | Age: 61
End: 2021-09-22

## 2021-09-22 ENCOUNTER — OFFICE VISIT (OUTPATIENT)
Dept: CARDIOLOGY | Facility: CLINIC | Age: 61
End: 2021-09-22
Payer: COMMERCIAL

## 2021-09-22 ENCOUNTER — DOCUMENTATION ONLY (OUTPATIENT)
Dept: CARDIOLOGY | Facility: CLINIC | Age: 61
End: 2021-09-22

## 2021-09-22 VITALS
HEART RATE: 77 BPM | OXYGEN SATURATION: 99 % | WEIGHT: 163.81 LBS | DIASTOLIC BLOOD PRESSURE: 80 MMHG | SYSTOLIC BLOOD PRESSURE: 151 MMHG | HEIGHT: 69 IN | BODY MASS INDEX: 24.26 KG/M2

## 2021-09-22 DIAGNOSIS — I25.10 CORONARY ARTERY DISEASE INVOLVING NATIVE CORONARY ARTERY OF NATIVE HEART WITHOUT ANGINA PECTORIS: Primary | ICD-10-CM

## 2021-09-22 DIAGNOSIS — R94.39 POSITIVE CARDIAC STRESS TEST: ICD-10-CM

## 2021-09-22 DIAGNOSIS — I20.89 STABLE ANGINA PECTORIS: ICD-10-CM

## 2021-09-22 DIAGNOSIS — R63.4 UNEXPLAINED WEIGHT LOSS: ICD-10-CM

## 2021-09-22 DIAGNOSIS — Z01.818 PRE-OP TESTING: ICD-10-CM

## 2021-09-22 LAB
ABO + RH BLD: NORMAL
ANION GAP SERPL CALC-SCNC: 13 MMOL/L (ref 8–16)
BASOPHILS # BLD AUTO: 0.03 K/UL (ref 0–0.2)
BASOPHILS NFR BLD: 0.6 % (ref 0–1.9)
BLD GP AB SCN CELLS X3 SERPL QL: NORMAL
BUN SERPL-MCNC: 15 MG/DL (ref 8–23)
CALCIUM SERPL-MCNC: 9.8 MG/DL (ref 8.7–10.5)
CHLORIDE SERPL-SCNC: 105 MMOL/L (ref 95–110)
CO2 SERPL-SCNC: 21 MMOL/L (ref 23–29)
CREAT SERPL-MCNC: 0.8 MG/DL (ref 0.5–1.4)
DIFFERENTIAL METHOD: ABNORMAL
EOSINOPHIL # BLD AUTO: 0.1 K/UL (ref 0–0.5)
EOSINOPHIL NFR BLD: 1.2 % (ref 0–8)
ERYTHROCYTE [DISTWIDTH] IN BLOOD BY AUTOMATED COUNT: 12.8 % (ref 11.5–14.5)
EST. GFR  (AFRICAN AMERICAN): >60 ML/MIN/1.73 M^2
EST. GFR  (NON AFRICAN AMERICAN): >60 ML/MIN/1.73 M^2
GLUCOSE SERPL-MCNC: 144 MG/DL (ref 70–110)
HCT VFR BLD AUTO: 45.3 % (ref 40–54)
HGB BLD-MCNC: 15.2 G/DL (ref 14–18)
IMM GRANULOCYTES # BLD AUTO: 0.03 K/UL (ref 0–0.04)
IMM GRANULOCYTES NFR BLD AUTO: 0.6 % (ref 0–0.5)
LYMPHOCYTES # BLD AUTO: 1.4 K/UL (ref 1–4.8)
LYMPHOCYTES NFR BLD: 26.2 % (ref 18–48)
MCH RBC QN AUTO: 31.1 PG (ref 27–31)
MCHC RBC AUTO-ENTMCNC: 33.6 G/DL (ref 32–36)
MCV RBC AUTO: 93 FL (ref 82–98)
MONOCYTES # BLD AUTO: 0.3 K/UL (ref 0.3–1)
MONOCYTES NFR BLD: 6.2 % (ref 4–15)
NEUTROPHILS # BLD AUTO: 3.4 K/UL (ref 1.8–7.7)
NEUTROPHILS NFR BLD: 65.2 % (ref 38–73)
NRBC BLD-RTO: 0 /100 WBC
PLATELET # BLD AUTO: 217 K/UL (ref 150–450)
PMV BLD AUTO: 9.5 FL (ref 9.2–12.9)
POTASSIUM SERPL-SCNC: 4.9 MMOL/L (ref 3.5–5.1)
RBC # BLD AUTO: 4.89 M/UL (ref 4.6–6.2)
SODIUM SERPL-SCNC: 139 MMOL/L (ref 136–145)
WBC # BLD AUTO: 5.19 K/UL (ref 3.9–12.7)

## 2021-09-22 PROCEDURE — 99999 PR PBB SHADOW E&M-EST. PATIENT-LVL IV: CPT | Mod: PBBFAC,,, | Performed by: INTERNAL MEDICINE

## 2021-09-22 PROCEDURE — 86900 BLOOD TYPING SEROLOGIC ABO: CPT | Performed by: INTERNAL MEDICINE

## 2021-09-22 PROCEDURE — 80048 BASIC METABOLIC PNL TOTAL CA: CPT | Performed by: INTERNAL MEDICINE

## 2021-09-22 PROCEDURE — 85025 COMPLETE CBC W/AUTO DIFF WBC: CPT | Performed by: INTERNAL MEDICINE

## 2021-09-22 PROCEDURE — 99999 PR PBB SHADOW E&M-EST. PATIENT-LVL IV: ICD-10-PCS | Mod: PBBFAC,,, | Performed by: INTERNAL MEDICINE

## 2021-09-22 PROCEDURE — 99204 PR OFFICE/OUTPT VISIT, NEW, LEVL IV, 45-59 MIN: ICD-10-PCS | Mod: S$GLB,,, | Performed by: INTERNAL MEDICINE

## 2021-09-22 PROCEDURE — 36415 COLL VENOUS BLD VENIPUNCTURE: CPT | Performed by: INTERNAL MEDICINE

## 2021-09-22 PROCEDURE — 99204 OFFICE O/P NEW MOD 45 MIN: CPT | Mod: S$GLB,,, | Performed by: INTERNAL MEDICINE

## 2021-09-22 RX ORDER — SODIUM CHLORIDE 9 MG/ML
INJECTION, SOLUTION INTRAVENOUS CONTINUOUS
Status: CANCELLED | OUTPATIENT
Start: 2021-09-22 | End: 2021-09-22

## 2021-09-22 RX ORDER — DIPHENHYDRAMINE HCL 50 MG
50 CAPSULE ORAL ONCE
Status: CANCELLED | OUTPATIENT
Start: 2021-09-22 | End: 2021-09-22

## 2021-09-22 RX ORDER — CLOPIDOGREL 300 MG/1
300 TABLET, FILM COATED ORAL ONCE
Qty: 1 TABLET | Refills: 0 | Status: ON HOLD | OUTPATIENT
Start: 2021-10-04 | End: 2021-10-05 | Stop reason: HOSPADM

## 2021-09-24 ENCOUNTER — TELEPHONE (OUTPATIENT)
Dept: GASTROENTEROLOGY | Facility: CLINIC | Age: 61
End: 2021-09-24

## 2021-09-27 ENCOUNTER — ANESTHESIA EVENT (OUTPATIENT)
Dept: ENDOSCOPY | Facility: HOSPITAL | Age: 61
End: 2021-09-27
Payer: COMMERCIAL

## 2021-09-27 ENCOUNTER — TELEPHONE (OUTPATIENT)
Dept: GASTROENTEROLOGY | Facility: CLINIC | Age: 61
End: 2021-09-27

## 2021-09-28 ENCOUNTER — TELEPHONE (OUTPATIENT)
Dept: GASTROENTEROLOGY | Facility: CLINIC | Age: 61
End: 2021-09-28

## 2021-09-29 ENCOUNTER — ANESTHESIA (OUTPATIENT)
Dept: ENDOSCOPY | Facility: HOSPITAL | Age: 61
End: 2021-09-29
Payer: COMMERCIAL

## 2021-10-03 ENCOUNTER — HOSPITAL ENCOUNTER (EMERGENCY)
Facility: HOSPITAL | Age: 61
Discharge: HOME | End: 2021-10-03
Payer: COMMERCIAL

## 2021-10-03 VITALS
RESPIRATION RATE: 18 BRPM | WEIGHT: 160 LBS | BODY MASS INDEX: 23.63 KG/M2 | SYSTOLIC BLOOD PRESSURE: 136 MMHG | TEMPERATURE: 97 F | DIASTOLIC BLOOD PRESSURE: 75 MMHG | HEART RATE: 81 BPM | OXYGEN SATURATION: 98 %

## 2021-10-03 LAB
CTP QC/QA: YES
SARS-COV-2 RDRP RESP QL NAA+PROBE: NEGATIVE

## 2021-10-03 PROCEDURE — U0002 COVID-19 LAB TEST NON-CDC: HCPCS | Performed by: NURSE PRACTITIONER

## 2021-10-03 PROCEDURE — 99900041 HC LEFT WITHOUT BEING SEEN- EMERGENCY

## 2021-10-05 ENCOUNTER — HOSPITAL ENCOUNTER (OUTPATIENT)
Facility: HOSPITAL | Age: 61
Discharge: HOME | End: 2021-10-05
Attending: INTERNAL MEDICINE | Admitting: INTERNAL MEDICINE
Payer: COMMERCIAL

## 2021-10-05 VITALS
OXYGEN SATURATION: 100 % | HEART RATE: 87 BPM | WEIGHT: 155 LBS | TEMPERATURE: 98 F | SYSTOLIC BLOOD PRESSURE: 130 MMHG | HEIGHT: 69 IN | RESPIRATION RATE: 20 BRPM | DIASTOLIC BLOOD PRESSURE: 71 MMHG | BODY MASS INDEX: 22.96 KG/M2

## 2021-10-05 DIAGNOSIS — Z98.61 POSTSURGICAL PERCUTANEOUS TRANSLUMINAL CORONARY ANGIOPLASTY STATUS: Primary | ICD-10-CM

## 2021-10-05 DIAGNOSIS — I25.10 CORONARY ARTERY DISEASE: ICD-10-CM

## 2021-10-05 DIAGNOSIS — R94.39 POSITIVE CARDIAC STRESS TEST: ICD-10-CM

## 2021-10-05 DIAGNOSIS — I25.10 CORONARY ARTERY DISEASE INVOLVING NATIVE CORONARY ARTERY OF NATIVE HEART WITHOUT ANGINA PECTORIS: ICD-10-CM

## 2021-10-05 LAB
ABO + RH BLD: NORMAL
ANION GAP SERPL CALC-SCNC: 10 MMOL/L (ref 8–16)
BASOPHILS # BLD AUTO: 0.03 K/UL (ref 0–0.2)
BASOPHILS NFR BLD: 0.6 % (ref 0–1.9)
BLD GP AB SCN CELLS X3 SERPL QL: NORMAL
BUN SERPL-MCNC: 18 MG/DL (ref 8–23)
CALCIUM SERPL-MCNC: 8.9 MG/DL (ref 8.7–10.5)
CHLORIDE SERPL-SCNC: 107 MMOL/L (ref 95–110)
CO2 SERPL-SCNC: 20 MMOL/L (ref 23–29)
CREAT SERPL-MCNC: 0.8 MG/DL (ref 0.5–1.4)
DIFFERENTIAL METHOD: ABNORMAL
EOSINOPHIL # BLD AUTO: 0.1 K/UL (ref 0–0.5)
EOSINOPHIL NFR BLD: 1.2 % (ref 0–8)
ERYTHROCYTE [DISTWIDTH] IN BLOOD BY AUTOMATED COUNT: 12.7 % (ref 11.5–14.5)
EST. GFR  (AFRICAN AMERICAN): >60 ML/MIN/1.73 M^2
EST. GFR  (NON AFRICAN AMERICAN): >60 ML/MIN/1.73 M^2
GLUCOSE SERPL-MCNC: 182 MG/DL (ref 70–110)
HCT VFR BLD AUTO: 40.7 % (ref 40–54)
HGB BLD-MCNC: 14.1 G/DL (ref 14–18)
IMM GRANULOCYTES # BLD AUTO: 0.01 K/UL (ref 0–0.04)
IMM GRANULOCYTES NFR BLD AUTO: 0.2 % (ref 0–0.5)
LYMPHOCYTES # BLD AUTO: 1.7 K/UL (ref 1–4.8)
LYMPHOCYTES NFR BLD: 33.5 % (ref 18–48)
MCH RBC QN AUTO: 32 PG (ref 27–31)
MCHC RBC AUTO-ENTMCNC: 34.6 G/DL (ref 32–36)
MCV RBC AUTO: 92 FL (ref 82–98)
MONOCYTES # BLD AUTO: 0.3 K/UL (ref 0.3–1)
MONOCYTES NFR BLD: 6.9 % (ref 4–15)
NEUTROPHILS # BLD AUTO: 2.9 K/UL (ref 1.8–7.7)
NEUTROPHILS NFR BLD: 57.6 % (ref 38–73)
NRBC BLD-RTO: 0 /100 WBC
PLATELET # BLD AUTO: 178 K/UL (ref 150–450)
PMV BLD AUTO: 9.5 FL (ref 9.2–12.9)
POCT GLUCOSE: 180 MG/DL (ref 70–110)
POTASSIUM SERPL-SCNC: 4.3 MMOL/L (ref 3.5–5.1)
RBC # BLD AUTO: 4.41 M/UL (ref 4.6–6.2)
SODIUM SERPL-SCNC: 137 MMOL/L (ref 136–145)
WBC # BLD AUTO: 4.96 K/UL (ref 3.9–12.7)

## 2021-10-05 PROCEDURE — 93010 ELECTROCARDIOGRAM REPORT: CPT | Mod: ,,, | Performed by: INTERNAL MEDICINE

## 2021-10-05 PROCEDURE — 86900 BLOOD TYPING SEROLOGIC ABO: CPT

## 2021-10-05 PROCEDURE — C1725 CATH, TRANSLUMIN NON-LASER: HCPCS | Performed by: INTERNAL MEDICINE

## 2021-10-05 PROCEDURE — 25500020 PHARM REV CODE 255: Performed by: INTERNAL MEDICINE

## 2021-10-05 PROCEDURE — 82962 GLUCOSE BLOOD TEST: CPT | Performed by: INTERNAL MEDICINE

## 2021-10-05 PROCEDURE — 92928 PRQ TCAT PLMT NTRAC ST 1 LES: CPT | Mod: LD,,, | Performed by: INTERNAL MEDICINE

## 2021-10-05 PROCEDURE — 25000003 PHARM REV CODE 250: Performed by: INTERNAL MEDICINE

## 2021-10-05 PROCEDURE — 93010 ELECTROCARDIOGRAM REPORT: CPT | Mod: 76,,, | Performed by: INTERNAL MEDICINE

## 2021-10-05 PROCEDURE — 99153 MOD SED SAME PHYS/QHP EA: CPT | Performed by: INTERNAL MEDICINE

## 2021-10-05 PROCEDURE — 92978 ENDOLUMINL IVUS OCT C 1ST: CPT | Mod: LD | Performed by: INTERNAL MEDICINE

## 2021-10-05 PROCEDURE — 25000003 PHARM REV CODE 250

## 2021-10-05 PROCEDURE — 93458 PR CATH PLACE/CORON ANGIO, IMG SUPER/INTERP,W LEFT HEART VENTRICULOGRAPHY: ICD-10-PCS | Mod: 26,59,51, | Performed by: INTERNAL MEDICINE

## 2021-10-05 PROCEDURE — 85347 COAGULATION TIME ACTIVATED: CPT | Performed by: INTERNAL MEDICINE

## 2021-10-05 PROCEDURE — 85025 COMPLETE CBC W/AUTO DIFF WBC: CPT

## 2021-10-05 PROCEDURE — 93005 ELECTROCARDIOGRAM TRACING: CPT

## 2021-10-05 PROCEDURE — C1894 INTRO/SHEATH, NON-LASER: HCPCS | Performed by: INTERNAL MEDICINE

## 2021-10-05 PROCEDURE — C1887 CATHETER, GUIDING: HCPCS | Performed by: INTERNAL MEDICINE

## 2021-10-05 PROCEDURE — C1769 GUIDE WIRE: HCPCS | Performed by: INTERNAL MEDICINE

## 2021-10-05 PROCEDURE — 36415 COLL VENOUS BLD VENIPUNCTURE: CPT

## 2021-10-05 PROCEDURE — 93458 L HRT ARTERY/VENTRICLE ANGIO: CPT | Mod: 26,59,51, | Performed by: INTERNAL MEDICINE

## 2021-10-05 PROCEDURE — 99152 MOD SED SAME PHYS/QHP 5/>YRS: CPT | Mod: ,,, | Performed by: INTERNAL MEDICINE

## 2021-10-05 PROCEDURE — 99152 PR MOD CONSCIOUS SEDATION, SAME PHYS, 5+ YRS, FIRST 15 MIN: ICD-10-PCS | Mod: ,,, | Performed by: INTERNAL MEDICINE

## 2021-10-05 PROCEDURE — C1874 STENT, COATED/COV W/DEL SYS: HCPCS | Performed by: INTERNAL MEDICINE

## 2021-10-05 PROCEDURE — C9600 PERC DRUG-EL COR STENT SING: HCPCS | Mod: LD | Performed by: INTERNAL MEDICINE

## 2021-10-05 PROCEDURE — 80048 BASIC METABOLIC PNL TOTAL CA: CPT

## 2021-10-05 PROCEDURE — 99152 MOD SED SAME PHYS/QHP 5/>YRS: CPT | Performed by: INTERNAL MEDICINE

## 2021-10-05 PROCEDURE — 93458 L HRT ARTERY/VENTRICLE ANGIO: CPT | Mod: 59 | Performed by: INTERNAL MEDICINE

## 2021-10-05 PROCEDURE — 92978 ENDOLUMINL IVUS OCT C 1ST: CPT | Mod: 26,LD,, | Performed by: INTERNAL MEDICINE

## 2021-10-05 PROCEDURE — 92928 PR STENT: ICD-10-PCS | Mod: LD,,, | Performed by: INTERNAL MEDICINE

## 2021-10-05 PROCEDURE — 93010 EKG 12-LEAD: ICD-10-PCS | Mod: 76,,, | Performed by: INTERNAL MEDICINE

## 2021-10-05 PROCEDURE — 27201423 OPTIME MED/SURG SUP & DEVICES STERILE SUPPLY: Performed by: INTERNAL MEDICINE

## 2021-10-05 PROCEDURE — C1753 CATH, INTRAVAS ULTRASOUND: HCPCS | Performed by: INTERNAL MEDICINE

## 2021-10-05 PROCEDURE — C1760 CLOSURE DEV, VASC: HCPCS | Performed by: INTERNAL MEDICINE

## 2021-10-05 PROCEDURE — 92978 PR IVUS, CORONARY, 1ST VESSEL: ICD-10-PCS | Mod: 26,LD,, | Performed by: INTERNAL MEDICINE

## 2021-10-05 PROCEDURE — 63600175 PHARM REV CODE 636 W HCPCS: Performed by: INTERNAL MEDICINE

## 2021-10-05 DEVICE — STENT RONYX30038UX RESOLUTE ONYX 3.00X38
Type: IMPLANTABLE DEVICE | Site: CORONARY | Status: FUNCTIONAL
Brand: RESOLUTE ONYX™

## 2021-10-05 DEVICE — ANGIO-SEAL VIP VASCULAR CLOSURE DEVICE
Type: IMPLANTABLE DEVICE | Site: GROIN | Status: FUNCTIONAL
Brand: ANGIO-SEAL

## 2021-10-05 RX ORDER — CLOPIDOGREL BISULFATE 75 MG/1
75 TABLET ORAL DAILY
Status: DISCONTINUED | OUTPATIENT
Start: 2021-10-06 | End: 2021-10-05

## 2021-10-05 RX ORDER — HEPARIN SODIUM 1000 [USP'U]/ML
INJECTION, SOLUTION INTRAVENOUS; SUBCUTANEOUS
Status: DISCONTINUED | OUTPATIENT
Start: 2021-10-05 | End: 2021-10-05 | Stop reason: HOSPADM

## 2021-10-05 RX ORDER — NITROGLYCERIN 5 MG/ML
INJECTION, SOLUTION INTRAVENOUS
Status: DISCONTINUED | OUTPATIENT
Start: 2021-10-05 | End: 2021-10-05 | Stop reason: HOSPADM

## 2021-10-05 RX ORDER — CLOPIDOGREL BISULFATE 75 MG/1
75 TABLET ORAL DAILY
Status: DISCONTINUED | OUTPATIENT
Start: 2021-10-06 | End: 2021-10-05 | Stop reason: HOSPADM

## 2021-10-05 RX ORDER — HEPARIN SOD,PORCINE/0.9 % NACL 1000/500ML
INTRAVENOUS SOLUTION INTRAVENOUS
Status: DISCONTINUED | OUTPATIENT
Start: 2021-10-05 | End: 2021-10-05 | Stop reason: HOSPADM

## 2021-10-05 RX ORDER — ASPIRIN 81 MG/1
81 TABLET ORAL ONCE
Status: COMPLETED | OUTPATIENT
Start: 2021-10-05 | End: 2021-10-05

## 2021-10-05 RX ORDER — DIPHENHYDRAMINE HCL 50 MG
50 CAPSULE ORAL ONCE
Status: COMPLETED | OUTPATIENT
Start: 2021-10-05 | End: 2021-10-05

## 2021-10-05 RX ORDER — FENTANYL CITRATE 50 UG/ML
INJECTION, SOLUTION INTRAMUSCULAR; INTRAVENOUS
Status: DISCONTINUED | OUTPATIENT
Start: 2021-10-05 | End: 2021-10-05 | Stop reason: HOSPADM

## 2021-10-05 RX ORDER — CLOPIDOGREL BISULFATE 75 MG/1
75 TABLET ORAL DAILY
Qty: 90 TABLET | Refills: 2 | Status: SHIPPED | OUTPATIENT
Start: 2021-10-05 | End: 2022-10-31

## 2021-10-05 RX ORDER — CLOPIDOGREL 300 MG/1
300 TABLET, FILM COATED ORAL ONCE
Status: COMPLETED | OUTPATIENT
Start: 2021-10-05 | End: 2021-10-05

## 2021-10-05 RX ORDER — SODIUM CHLORIDE 9 MG/ML
INJECTION, SOLUTION INTRAVENOUS CONTINUOUS
Status: ACTIVE | OUTPATIENT
Start: 2021-10-05 | End: 2021-10-05

## 2021-10-05 RX ORDER — ACETAMINOPHEN 325 MG/1
650 TABLET ORAL EVERY 4 HOURS PRN
Status: DISCONTINUED | OUTPATIENT
Start: 2021-10-05 | End: 2021-10-05 | Stop reason: HOSPADM

## 2021-10-05 RX ORDER — ONDANSETRON 8 MG/1
8 TABLET, ORALLY DISINTEGRATING ORAL EVERY 8 HOURS PRN
Status: DISCONTINUED | OUTPATIENT
Start: 2021-10-05 | End: 2021-10-05 | Stop reason: HOSPADM

## 2021-10-05 RX ORDER — MIDAZOLAM HYDROCHLORIDE 1 MG/ML
INJECTION, SOLUTION INTRAMUSCULAR; INTRAVENOUS
Status: DISCONTINUED | OUTPATIENT
Start: 2021-10-05 | End: 2021-10-05 | Stop reason: HOSPADM

## 2021-10-05 RX ADMIN — CLOPIDOGREL BISULFATE 300 MG: 300 TABLET, FILM COATED ORAL at 07:10

## 2021-10-05 RX ADMIN — ACETAMINOPHEN 650 MG: 325 TABLET ORAL at 10:10

## 2021-10-05 RX ADMIN — ASPIRIN 81 MG: 81 TABLET, COATED ORAL at 07:10

## 2021-10-05 RX ADMIN — CLOPIDOGREL BISULFATE 300 MG: 300 TABLET, FILM COATED ORAL at 10:10

## 2021-10-05 RX ADMIN — SODIUM CHLORIDE: 0.9 INJECTION, SOLUTION INTRAVENOUS at 07:10

## 2021-10-05 RX ADMIN — DIPHENHYDRAMINE HYDROCHLORIDE 50 MG: 50 CAPSULE ORAL at 07:10

## 2021-10-06 LAB
POC ACTIVATED CLOTTING TIME K: 224 SEC (ref 74–137)
POC ACTIVATED CLOTTING TIME K: 224 SEC (ref 74–137)
SAMPLE: ABNORMAL
SAMPLE: ABNORMAL

## 2021-10-12 ENCOUNTER — SPECIALTY PHARMACY (OUTPATIENT)
Dept: PHARMACY | Facility: CLINIC | Age: 61
End: 2021-10-12

## 2021-10-15 ENCOUNTER — TELEPHONE (OUTPATIENT)
Dept: CARDIAC REHAB | Facility: CLINIC | Age: 61
End: 2021-10-15

## 2021-10-18 ENCOUNTER — TELEPHONE (OUTPATIENT)
Dept: GASTROENTEROLOGY | Facility: CLINIC | Age: 61
End: 2021-10-18
Payer: COMMERCIAL

## 2021-10-18 ENCOUNTER — LAB VISIT (OUTPATIENT)
Dept: FAMILY MEDICINE | Facility: CLINIC | Age: 61
End: 2021-10-18
Payer: COMMERCIAL

## 2021-10-18 DIAGNOSIS — Z01.812 PRE-PROCEDURE LAB EXAM: ICD-10-CM

## 2021-10-18 PROCEDURE — U0005 INFEC AGEN DETEC AMPLI PROBE: HCPCS | Performed by: INTERNAL MEDICINE

## 2021-10-18 PROCEDURE — U0003 INFECTIOUS AGENT DETECTION BY NUCLEIC ACID (DNA OR RNA); SEVERE ACUTE RESPIRATORY SYNDROME CORONAVIRUS 2 (SARS-COV-2) (CORONAVIRUS DISEASE [COVID-19]), AMPLIFIED PROBE TECHNIQUE, MAKING USE OF HIGH THROUGHPUT TECHNOLOGIES AS DESCRIBED BY CMS-2020-01-R: HCPCS | Performed by: INTERNAL MEDICINE

## 2021-10-19 ENCOUNTER — PATIENT MESSAGE (OUTPATIENT)
Dept: INTERNAL MEDICINE | Facility: CLINIC | Age: 61
End: 2021-10-19

## 2021-10-19 ENCOUNTER — OFFICE VISIT (OUTPATIENT)
Dept: UROLOGY | Facility: CLINIC | Age: 61
End: 2021-10-19
Payer: COMMERCIAL

## 2021-10-19 VITALS
DIASTOLIC BLOOD PRESSURE: 80 MMHG | HEIGHT: 68 IN | BODY MASS INDEX: 24.19 KG/M2 | WEIGHT: 159.63 LBS | HEART RATE: 78 BPM | SYSTOLIC BLOOD PRESSURE: 129 MMHG

## 2021-10-19 DIAGNOSIS — N52.9 ED (ERECTILE DYSFUNCTION) OF ORGANIC ORIGIN: ICD-10-CM

## 2021-10-19 DIAGNOSIS — N52.9 ERECTILE DYSFUNCTION, UNSPECIFIED ERECTILE DYSFUNCTION TYPE: ICD-10-CM

## 2021-10-19 LAB
SARS-COV-2 RNA RESP QL NAA+PROBE: NOT DETECTED
SARS-COV-2- CYCLE NUMBER: NORMAL

## 2021-10-19 PROCEDURE — 99999 PR PBB SHADOW E&M-EST. PATIENT-LVL IV: CPT | Mod: PBBFAC,,, | Performed by: UROLOGY

## 2021-10-19 PROCEDURE — 99999 PR PBB SHADOW E&M-EST. PATIENT-LVL IV: ICD-10-PCS | Mod: PBBFAC,,, | Performed by: UROLOGY

## 2021-10-19 PROCEDURE — 99244 OFF/OP CNSLTJ NEW/EST MOD 40: CPT | Mod: S$GLB,,, | Performed by: UROLOGY

## 2021-10-19 PROCEDURE — 99244 PR OFFICE CONSULTATION,LEVEL IV: ICD-10-PCS | Mod: S$GLB,,, | Performed by: UROLOGY

## 2021-10-19 RX ORDER — VARDENAFIL 11.85 MG/1
10 TABLET ORAL DAILY
Qty: 8 TABLET | Refills: 11 | Status: SHIPPED | OUTPATIENT
Start: 2021-10-19 | End: 2022-10-31

## 2021-10-19 RX ORDER — VARDENAFIL HYDROCHLORIDE 20 MG/1
20 TABLET ORAL DAILY PRN
Qty: 8 TABLET | Refills: 11 | Status: SHIPPED | OUTPATIENT
Start: 2021-10-19 | End: 2022-10-31

## 2021-10-19 RX ORDER — AVANAFIL 200 MG/1
200 TABLET ORAL
Qty: 8 TABLET | Refills: 11 | Status: SHIPPED | OUTPATIENT
Start: 2021-10-19

## 2021-10-20 ENCOUNTER — PATIENT MESSAGE (OUTPATIENT)
Dept: INTERNAL MEDICINE | Facility: CLINIC | Age: 61
End: 2021-10-20
Payer: COMMERCIAL

## 2021-10-21 ENCOUNTER — TELEPHONE (OUTPATIENT)
Dept: CARDIAC REHAB | Facility: CLINIC | Age: 61
End: 2021-10-21

## 2021-11-02 ENCOUNTER — TELEPHONE (OUTPATIENT)
Dept: CARDIOLOGY | Facility: CLINIC | Age: 61
End: 2021-11-02
Payer: COMMERCIAL

## 2021-11-02 ENCOUNTER — LAB VISIT (OUTPATIENT)
Dept: LAB | Facility: HOSPITAL | Age: 61
End: 2021-11-02
Attending: INTERNAL MEDICINE
Payer: COMMERCIAL

## 2021-11-02 ENCOUNTER — PATIENT OUTREACH (OUTPATIENT)
Dept: ADMINISTRATIVE | Facility: OTHER | Age: 61
End: 2021-11-02
Payer: COMMERCIAL

## 2021-11-02 DIAGNOSIS — E78.49 OTHER HYPERLIPIDEMIA: Primary | ICD-10-CM

## 2021-11-02 DIAGNOSIS — E78.49 OTHER HYPERLIPIDEMIA: ICD-10-CM

## 2021-11-02 DIAGNOSIS — I10 ESSENTIAL HYPERTENSION: ICD-10-CM

## 2021-11-02 LAB
ALBUMIN SERPL BCP-MCNC: 4.1 G/DL (ref 3.5–5.2)
ALP SERPL-CCNC: 65 U/L (ref 55–135)
ALT SERPL W/O P-5'-P-CCNC: 12 U/L (ref 10–44)
AST SERPL-CCNC: 18 U/L (ref 10–40)
BILIRUB DIRECT SERPL-MCNC: 0.1 MG/DL (ref 0.1–0.3)
BILIRUB SERPL-MCNC: 0.3 MG/DL (ref 0.1–1)
CHOLEST SERPL-MCNC: 84 MG/DL (ref 120–199)
CHOLEST/HDLC SERPL: 2.2 {RATIO} (ref 2–5)
HDLC SERPL-MCNC: 38 MG/DL (ref 40–75)
HDLC SERPL: 45.2 % (ref 20–50)
LDLC SERPL CALC-MCNC: 15 MG/DL (ref 63–159)
NONHDLC SERPL-MCNC: 46 MG/DL
PROT SERPL-MCNC: 6.6 G/DL (ref 6–8.4)
TRIGL SERPL-MCNC: 155 MG/DL (ref 30–150)

## 2021-11-02 PROCEDURE — 80076 HEPATIC FUNCTION PANEL: CPT | Performed by: INTERNAL MEDICINE

## 2021-11-02 PROCEDURE — 80061 LIPID PANEL: CPT | Performed by: INTERNAL MEDICINE

## 2021-11-02 PROCEDURE — 36415 COLL VENOUS BLD VENIPUNCTURE: CPT | Mod: PO | Performed by: INTERNAL MEDICINE

## 2021-11-03 ENCOUNTER — OFFICE VISIT (OUTPATIENT)
Dept: CARDIOLOGY | Facility: CLINIC | Age: 61
End: 2021-11-03
Payer: COMMERCIAL

## 2021-11-03 VITALS
SYSTOLIC BLOOD PRESSURE: 128 MMHG | WEIGHT: 162.25 LBS | DIASTOLIC BLOOD PRESSURE: 67 MMHG | OXYGEN SATURATION: 97 % | HEIGHT: 70 IN | HEART RATE: 77 BPM | BODY MASS INDEX: 23.23 KG/M2

## 2021-11-03 DIAGNOSIS — E78.5 HYPERLIPIDEMIA ASSOCIATED WITH TYPE 2 DIABETES MELLITUS: ICD-10-CM

## 2021-11-03 DIAGNOSIS — I25.10 CORONARY ARTERY DISEASE INVOLVING NATIVE CORONARY ARTERY OF NATIVE HEART WITHOUT ANGINA PECTORIS: Primary | ICD-10-CM

## 2021-11-03 DIAGNOSIS — I10 ESSENTIAL HYPERTENSION: ICD-10-CM

## 2021-11-03 DIAGNOSIS — I70.0 AORTIC ATHEROSCLEROSIS: ICD-10-CM

## 2021-11-03 DIAGNOSIS — E11.65 TYPE 2 DIABETES MELLITUS WITH HYPERGLYCEMIA, WITHOUT LONG-TERM CURRENT USE OF INSULIN: Chronic | ICD-10-CM

## 2021-11-03 DIAGNOSIS — E11.69 HYPERLIPIDEMIA ASSOCIATED WITH TYPE 2 DIABETES MELLITUS: ICD-10-CM

## 2021-11-03 PROCEDURE — 99999 PR PBB SHADOW E&M-EST. PATIENT-LVL III: CPT | Mod: PBBFAC,,, | Performed by: INTERNAL MEDICINE

## 2021-11-03 PROCEDURE — 99213 PR OFFICE/OUTPT VISIT, EST, LEVL III, 20-29 MIN: ICD-10-PCS | Mod: S$GLB,,, | Performed by: INTERNAL MEDICINE

## 2021-11-03 PROCEDURE — 99213 OFFICE O/P EST LOW 20 MIN: CPT | Mod: S$GLB,,, | Performed by: INTERNAL MEDICINE

## 2021-11-03 PROCEDURE — 99999 PR PBB SHADOW E&M-EST. PATIENT-LVL III: ICD-10-PCS | Mod: PBBFAC,,, | Performed by: INTERNAL MEDICINE

## 2021-11-10 ENCOUNTER — PATIENT MESSAGE (OUTPATIENT)
Dept: PHARMACY | Facility: CLINIC | Age: 61
End: 2021-11-10
Payer: COMMERCIAL

## 2021-11-17 ENCOUNTER — PATIENT MESSAGE (OUTPATIENT)
Dept: PHARMACY | Facility: CLINIC | Age: 61
End: 2021-11-17
Payer: COMMERCIAL

## 2021-11-18 ENCOUNTER — SPECIALTY PHARMACY (OUTPATIENT)
Dept: PHARMACY | Facility: CLINIC | Age: 61
End: 2021-11-18
Payer: COMMERCIAL

## 2021-11-22 ENCOUNTER — PATIENT MESSAGE (OUTPATIENT)
Dept: UROLOGY | Facility: CLINIC | Age: 61
End: 2021-11-22
Payer: COMMERCIAL

## 2021-11-22 DIAGNOSIS — N52.9 ED (ERECTILE DYSFUNCTION) OF ORGANIC ORIGIN: Primary | ICD-10-CM

## 2021-11-22 RX ORDER — SILDENAFIL CITRATE 20 MG/1
60 TABLET ORAL
Qty: 45 TABLET | Refills: 5 | Status: SHIPPED | OUTPATIENT
Start: 2021-11-22 | End: 2022-10-31

## 2021-12-07 ENCOUNTER — PATIENT MESSAGE (OUTPATIENT)
Dept: INTERNAL MEDICINE | Facility: CLINIC | Age: 61
End: 2021-12-07
Payer: COMMERCIAL

## 2021-12-13 ENCOUNTER — SPECIALTY PHARMACY (OUTPATIENT)
Dept: PHARMACY | Facility: CLINIC | Age: 61
End: 2021-12-13
Payer: COMMERCIAL

## 2021-12-16 ENCOUNTER — LAB VISIT (OUTPATIENT)
Dept: LAB | Facility: HOSPITAL | Age: 61
End: 2021-12-16
Attending: INTERNAL MEDICINE
Payer: COMMERCIAL

## 2021-12-16 ENCOUNTER — TELEPHONE (OUTPATIENT)
Dept: CARDIOLOGY | Facility: CLINIC | Age: 61
End: 2021-12-16
Payer: COMMERCIAL

## 2021-12-16 DIAGNOSIS — E78.49 OTHER HYPERLIPIDEMIA: ICD-10-CM

## 2021-12-16 LAB
ALBUMIN SERPL BCP-MCNC: 3.9 G/DL (ref 3.5–5.2)
ALP SERPL-CCNC: 68 U/L (ref 55–135)
ALT SERPL W/O P-5'-P-CCNC: 9 U/L (ref 10–44)
AST SERPL-CCNC: 12 U/L (ref 10–40)
BILIRUB DIRECT SERPL-MCNC: 0.3 MG/DL (ref 0.1–0.3)
BILIRUB SERPL-MCNC: 0.6 MG/DL (ref 0.1–1)
CHOLEST SERPL-MCNC: 87 MG/DL (ref 120–199)
CHOLEST/HDLC SERPL: 2 {RATIO} (ref 2–5)
HDLC SERPL-MCNC: 44 MG/DL (ref 40–75)
HDLC SERPL: 50.6 % (ref 20–50)
LDLC SERPL CALC-MCNC: 30.6 MG/DL (ref 63–159)
NONHDLC SERPL-MCNC: 43 MG/DL
PROT SERPL-MCNC: 6.5 G/DL (ref 6–8.4)
TRIGL SERPL-MCNC: 62 MG/DL (ref 30–150)

## 2021-12-16 PROCEDURE — 80061 LIPID PANEL: CPT | Performed by: INTERNAL MEDICINE

## 2021-12-16 PROCEDURE — 80076 HEPATIC FUNCTION PANEL: CPT | Performed by: INTERNAL MEDICINE

## 2021-12-16 PROCEDURE — 36415 COLL VENOUS BLD VENIPUNCTURE: CPT | Mod: PO | Performed by: INTERNAL MEDICINE

## 2022-01-02 ENCOUNTER — PATIENT MESSAGE (OUTPATIENT)
Dept: INTERNAL MEDICINE | Facility: CLINIC | Age: 62
End: 2022-01-02
Payer: COMMERCIAL

## 2022-01-03 ENCOUNTER — TELEPHONE (OUTPATIENT)
Dept: INTERNAL MEDICINE | Facility: CLINIC | Age: 62
End: 2022-01-03
Payer: COMMERCIAL

## 2022-01-04 ENCOUNTER — PATIENT MESSAGE (OUTPATIENT)
Dept: INTERNAL MEDICINE | Facility: CLINIC | Age: 62
End: 2022-01-04
Payer: COMMERCIAL

## 2022-01-04 NOTE — TELEPHONE ENCOUNTER
Pt doesn't need a new referral for gastro; he has seen dr soares. Would prefer in office since he is having weight loss; ok to see another provider if I don't have any openings

## 2022-01-05 NOTE — TELEPHONE ENCOUNTER
Pt needs to be seen prior to may. If I have a virtual you can schedule him then; if not, he should see a colleague in office. In addition, he should call gastro to book a f/u with dr soares

## 2022-01-08 ENCOUNTER — HOSPITAL ENCOUNTER (INPATIENT)
Facility: HOSPITAL | Age: 62
LOS: 2 days | Discharge: HOME | DRG: 177 | End: 2022-01-10
Attending: EMERGENCY MEDICINE | Admitting: HOSPITALIST
Payer: COMMERCIAL

## 2022-01-08 DIAGNOSIS — U07.1 COVID-19 VIRUS INFECTION: Primary | ICD-10-CM

## 2022-01-08 DIAGNOSIS — R09.02 HYPOXIA: ICD-10-CM

## 2022-01-08 DIAGNOSIS — U07.1 COVID-19: ICD-10-CM

## 2022-01-08 PROBLEM — J12.82 PNEUMONIA DUE TO COVID-19 VIRUS: Status: ACTIVE | Noted: 2022-01-08

## 2022-01-08 PROBLEM — J96.01 ACUTE HYPOXEMIC RESPIRATORY FAILURE: Status: ACTIVE | Noted: 2022-01-08

## 2022-01-08 LAB
ALBUMIN SERPL BCP-MCNC: 3.2 G/DL (ref 3.5–5.2)
ALP SERPL-CCNC: 60 U/L (ref 55–135)
ALT SERPL W/O P-5'-P-CCNC: 11 U/L (ref 10–44)
ANION GAP SERPL CALC-SCNC: 15 MMOL/L (ref 8–16)
AST SERPL-CCNC: 19 U/L (ref 10–40)
BASOPHILS # BLD AUTO: 0.01 K/UL (ref 0–0.2)
BASOPHILS NFR BLD: 0.1 % (ref 0–1.9)
BILIRUB SERPL-MCNC: 0.4 MG/DL (ref 0.1–1)
BUN SERPL-MCNC: 21 MG/DL (ref 8–23)
CALCIUM SERPL-MCNC: 9.1 MG/DL (ref 8.7–10.5)
CHLORIDE SERPL-SCNC: 102 MMOL/L (ref 95–110)
CK SERPL-CCNC: 25 U/L (ref 20–200)
CO2 SERPL-SCNC: 19 MMOL/L (ref 23–29)
CREAT SERPL-MCNC: 1 MG/DL (ref 0.5–1.4)
CRP SERPL-MCNC: 205 MG/L (ref 0–8.2)
CTP QC/QA: YES
D DIMER PPP IA.FEU-MCNC: 0.87 MG/L FEU
DIFFERENTIAL METHOD: ABNORMAL
EOSINOPHIL # BLD AUTO: 0 K/UL (ref 0–0.5)
EOSINOPHIL NFR BLD: 0 % (ref 0–8)
ERYTHROCYTE [DISTWIDTH] IN BLOOD BY AUTOMATED COUNT: 13 % (ref 11.5–14.5)
EST. GFR  (AFRICAN AMERICAN): >60 ML/MIN/1.73 M^2
EST. GFR  (NON AFRICAN AMERICAN): >60 ML/MIN/1.73 M^2
FERRITIN SERPL-MCNC: 742 NG/ML (ref 20–300)
GLUCOSE SERPL-MCNC: 204 MG/DL (ref 70–110)
HCT VFR BLD AUTO: 42.3 % (ref 40–54)
HGB BLD-MCNC: 14.7 G/DL (ref 14–18)
IMM GRANULOCYTES # BLD AUTO: 0.05 K/UL (ref 0–0.04)
IMM GRANULOCYTES NFR BLD AUTO: 0.7 % (ref 0–0.5)
INR PPP: 1 (ref 0.8–1.2)
LACTATE SERPL-SCNC: 1.1 MMOL/L (ref 0.5–2.2)
LDH SERPL L TO P-CCNC: 258 U/L (ref 110–260)
LYMPHOCYTES # BLD AUTO: 0.5 K/UL (ref 1–4.8)
LYMPHOCYTES NFR BLD: 7.2 % (ref 18–48)
MCH RBC QN AUTO: 31.7 PG (ref 27–31)
MCHC RBC AUTO-ENTMCNC: 34.8 G/DL (ref 32–36)
MCV RBC AUTO: 91 FL (ref 82–98)
MONOCYTES # BLD AUTO: 0.4 K/UL (ref 0.3–1)
MONOCYTES NFR BLD: 4.9 % (ref 4–15)
NEUTROPHILS # BLD AUTO: 6.4 K/UL (ref 1.8–7.7)
NEUTROPHILS NFR BLD: 87.1 % (ref 38–73)
NRBC BLD-RTO: 0 /100 WBC
PLATELET # BLD AUTO: 192 K/UL (ref 150–450)
PMV BLD AUTO: 9.9 FL (ref 9.2–12.9)
POC MOLECULAR INFLUENZA A AGN: NEGATIVE
POC MOLECULAR INFLUENZA B AGN: NEGATIVE
POCT GLUCOSE: 242 MG/DL (ref 70–110)
POTASSIUM SERPL-SCNC: 4.8 MMOL/L (ref 3.5–5.1)
PROT SERPL-MCNC: 7 G/DL (ref 6–8.4)
PROTHROMBIN TIME: 10.7 SEC (ref 9–12.5)
RBC # BLD AUTO: 4.64 M/UL (ref 4.6–6.2)
SARS-COV-2 RDRP RESP QL NAA+PROBE: POSITIVE
SARS-COV-2 RDRP RESP QL NAA+PROBE: POSITIVE
SODIUM SERPL-SCNC: 136 MMOL/L (ref 136–145)
TROPONIN I SERPL DL<=0.01 NG/ML-MCNC: 0.01 NG/ML (ref 0–0.03)
WBC # BLD AUTO: 7.34 K/UL (ref 3.9–12.7)

## 2022-01-08 PROCEDURE — 63600175 PHARM REV CODE 636 W HCPCS: Performed by: HOSPITALIST

## 2022-01-08 PROCEDURE — 82728 ASSAY OF FERRITIN: CPT | Performed by: EMERGENCY MEDICINE

## 2022-01-08 PROCEDURE — 83615 LACTATE (LD) (LDH) ENZYME: CPT | Performed by: EMERGENCY MEDICINE

## 2022-01-08 PROCEDURE — 85610 PROTHROMBIN TIME: CPT | Performed by: HOSPITALIST

## 2022-01-08 PROCEDURE — 83605 ASSAY OF LACTIC ACID: CPT | Performed by: EMERGENCY MEDICINE

## 2022-01-08 PROCEDURE — 11000001 HC ACUTE MED/SURG PRIVATE ROOM

## 2022-01-08 PROCEDURE — 27000221 HC OXYGEN, UP TO 24 HOURS

## 2022-01-08 PROCEDURE — 27000207 HC ISOLATION

## 2022-01-08 PROCEDURE — 85379 FIBRIN DEGRADATION QUANT: CPT | Performed by: HOSPITALIST

## 2022-01-08 PROCEDURE — 27100098 HC SPACER

## 2022-01-08 PROCEDURE — 86140 C-REACTIVE PROTEIN: CPT | Performed by: EMERGENCY MEDICINE

## 2022-01-08 PROCEDURE — C9399 UNCLASSIFIED DRUGS OR BIOLOG: HCPCS | Performed by: HOSPITALIST

## 2022-01-08 PROCEDURE — 25000003 PHARM REV CODE 250: Performed by: HOSPITALIST

## 2022-01-08 PROCEDURE — 85025 COMPLETE CBC W/AUTO DIFF WBC: CPT | Performed by: EMERGENCY MEDICINE

## 2022-01-08 PROCEDURE — 99900035 HC TECH TIME PER 15 MIN (STAT)

## 2022-01-08 PROCEDURE — U0002 COVID-19 LAB TEST NON-CDC: HCPCS | Performed by: EMERGENCY MEDICINE

## 2022-01-08 PROCEDURE — 94799 UNLISTED PULMONARY SVC/PX: CPT

## 2022-01-08 PROCEDURE — 82550 ASSAY OF CK (CPK): CPT | Performed by: EMERGENCY MEDICINE

## 2022-01-08 PROCEDURE — 80053 COMPREHEN METABOLIC PANEL: CPT | Performed by: EMERGENCY MEDICINE

## 2022-01-08 PROCEDURE — 99291 CRITICAL CARE FIRST HOUR: CPT | Mod: 25

## 2022-01-08 PROCEDURE — 94640 AIRWAY INHALATION TREATMENT: CPT

## 2022-01-08 PROCEDURE — 84484 ASSAY OF TROPONIN QUANT: CPT | Performed by: EMERGENCY MEDICINE

## 2022-01-08 PROCEDURE — 25000242 PHARM REV CODE 250 ALT 637 W/ HCPCS: Performed by: HOSPITALIST

## 2022-01-08 RX ORDER — LOPERAMIDE HYDROCHLORIDE 2 MG/1
2 CAPSULE ORAL EVERY 6 HOURS PRN
Status: DISCONTINUED | OUTPATIENT
Start: 2022-01-08 | End: 2022-01-10 | Stop reason: HOSPADM

## 2022-01-08 RX ORDER — TALC
6 POWDER (GRAM) TOPICAL NIGHTLY PRN
Status: DISCONTINUED | OUTPATIENT
Start: 2022-01-08 | End: 2022-01-10 | Stop reason: HOSPADM

## 2022-01-08 RX ORDER — LOSARTAN POTASSIUM 25 MG/1
25 TABLET ORAL DAILY
Status: DISCONTINUED | OUTPATIENT
Start: 2022-01-09 | End: 2022-01-10 | Stop reason: HOSPADM

## 2022-01-08 RX ORDER — SODIUM CHLORIDE 0.9 % (FLUSH) 0.9 %
10 SYRINGE (ML) INJECTION
Status: DISCONTINUED | OUTPATIENT
Start: 2022-01-08 | End: 2022-01-10 | Stop reason: HOSPADM

## 2022-01-08 RX ORDER — ASPIRIN 81 MG/1
81 TABLET ORAL DAILY
Status: DISCONTINUED | OUTPATIENT
Start: 2022-01-08 | End: 2022-01-10 | Stop reason: HOSPADM

## 2022-01-08 RX ORDER — ONDANSETRON 2 MG/ML
4 INJECTION INTRAMUSCULAR; INTRAVENOUS EVERY 8 HOURS PRN
Status: DISCONTINUED | OUTPATIENT
Start: 2022-01-08 | End: 2022-01-10 | Stop reason: HOSPADM

## 2022-01-08 RX ORDER — ACETAMINOPHEN 500 MG
1000 TABLET ORAL EVERY 8 HOURS PRN
Status: DISCONTINUED | OUTPATIENT
Start: 2022-01-08 | End: 2022-01-10 | Stop reason: HOSPADM

## 2022-01-08 RX ORDER — ASCORBIC ACID 500 MG
500 TABLET ORAL 2 TIMES DAILY
Status: DISCONTINUED | OUTPATIENT
Start: 2022-01-08 | End: 2022-01-10 | Stop reason: HOSPADM

## 2022-01-08 RX ORDER — DICLOFENAC SODIUM 10 MG/G
2 GEL TOPICAL DAILY
Status: DISCONTINUED | OUTPATIENT
Start: 2022-01-09 | End: 2022-01-08

## 2022-01-08 RX ORDER — IBUPROFEN 200 MG
24 TABLET ORAL
Status: DISCONTINUED | OUTPATIENT
Start: 2022-01-08 | End: 2022-01-10 | Stop reason: HOSPADM

## 2022-01-08 RX ORDER — METOPROLOL SUCCINATE 25 MG/1
25 TABLET, EXTENDED RELEASE ORAL DAILY
Status: DISCONTINUED | OUTPATIENT
Start: 2022-01-09 | End: 2022-01-10 | Stop reason: HOSPADM

## 2022-01-08 RX ORDER — ACETAMINOPHEN 325 MG/1
650 TABLET ORAL EVERY 4 HOURS PRN
Status: DISCONTINUED | OUTPATIENT
Start: 2022-01-08 | End: 2022-01-10 | Stop reason: HOSPADM

## 2022-01-08 RX ORDER — GLUCAGON 1 MG
1 KIT INJECTION
Status: DISCONTINUED | OUTPATIENT
Start: 2022-01-08 | End: 2022-01-10 | Stop reason: HOSPADM

## 2022-01-08 RX ORDER — IBUPROFEN 200 MG
16 TABLET ORAL
Status: DISCONTINUED | OUTPATIENT
Start: 2022-01-08 | End: 2022-01-10 | Stop reason: HOSPADM

## 2022-01-08 RX ORDER — INSULIN ASPART 100 [IU]/ML
0-5 INJECTION, SOLUTION INTRAVENOUS; SUBCUTANEOUS
Status: DISCONTINUED | OUTPATIENT
Start: 2022-01-08 | End: 2022-01-10 | Stop reason: HOSPADM

## 2022-01-08 RX ORDER — ALBUTEROL SULFATE 90 UG/1
2 AEROSOL, METERED RESPIRATORY (INHALATION) EVERY 6 HOURS
Status: DISCONTINUED | OUTPATIENT
Start: 2022-01-08 | End: 2022-01-10 | Stop reason: HOSPADM

## 2022-01-08 RX ORDER — ENOXAPARIN SODIUM 100 MG/ML
1 INJECTION SUBCUTANEOUS 2 TIMES DAILY
Status: DISCONTINUED | OUTPATIENT
Start: 2022-01-08 | End: 2022-01-10 | Stop reason: HOSPADM

## 2022-01-08 RX ORDER — CLOPIDOGREL BISULFATE 75 MG/1
75 TABLET ORAL NIGHTLY
Status: DISCONTINUED | OUTPATIENT
Start: 2022-01-08 | End: 2022-01-10 | Stop reason: HOSPADM

## 2022-01-08 RX ORDER — GUAIFENESIN/DEXTROMETHORPHAN 100-10MG/5
10 SYRUP ORAL EVERY 4 HOURS PRN
Status: DISCONTINUED | OUTPATIENT
Start: 2022-01-08 | End: 2022-01-10 | Stop reason: HOSPADM

## 2022-01-08 RX ORDER — DICLOFENAC SODIUM 10 MG/G
2 GEL TOPICAL DAILY
Status: DISCONTINUED | OUTPATIENT
Start: 2022-01-08 | End: 2022-01-10 | Stop reason: HOSPADM

## 2022-01-08 RX ADMIN — ACETAMINOPHEN 1000 MG: 500 TABLET ORAL at 05:01

## 2022-01-08 RX ADMIN — INSULIN DETEMIR 5 UNITS: 100 INJECTION, SOLUTION SUBCUTANEOUS at 09:01

## 2022-01-08 RX ADMIN — OXYCODONE HYDROCHLORIDE AND ACETAMINOPHEN 500 MG: 500 TABLET ORAL at 09:01

## 2022-01-08 RX ADMIN — ENOXAPARIN SODIUM 70 MG: 80 INJECTION SUBCUTANEOUS at 09:01

## 2022-01-08 RX ADMIN — ASPIRIN 81 MG: 81 TABLET, COATED ORAL at 03:01

## 2022-01-08 RX ADMIN — REMDESIVIR 200 MG: 100 INJECTION, POWDER, LYOPHILIZED, FOR SOLUTION INTRAVENOUS at 05:01

## 2022-01-08 RX ADMIN — INSULIN ASPART 1 UNITS: 100 INJECTION, SOLUTION INTRAVENOUS; SUBCUTANEOUS at 09:01

## 2022-01-08 RX ADMIN — DEXAMETHASONE 6 MG: 4 TABLET ORAL at 03:01

## 2022-01-08 RX ADMIN — DICLOFENAC SODIUM 2 G: 10 GEL TOPICAL at 08:01

## 2022-01-08 RX ADMIN — ALBUTEROL SULFATE 2 PUFF: 90 AEROSOL, METERED RESPIRATORY (INHALATION) at 07:01

## 2022-01-08 RX ADMIN — CLOPIDOGREL 75 MG: 75 TABLET, FILM COATED ORAL at 09:01

## 2022-01-08 NOTE — ED PROVIDER NOTES
Encounter Date: 1/8/2022       History     Chief Complaint   Patient presents with    COVID-19 Concerns     Pt presents to ED with body aches, fatigue, back pain, loss of appetite, runny nose, sore throat, and cough for 5 days.      Patient is a 61-year-old male with a past medical history of type 2 diabetes, hypertension hyperlipidemia presents ED with complaint of COVID-19 concerns patient states that approximately 5 days ago onset of body aches fatigue, back pain loss of sense of taste and/or smell.  He claims that he tested positive for COVID 19 at this time.  He reports no improvement of the symptoms despite using medications for symptomatic care.  He does report mild worsening of shortness of breath, however.  He states he went to a local urgent care yesterday and does strain for which she was given a shot for pain. Reports mild improvement is concerned as symptoms have persisted for more than 5 days.  On arrival O2 sat notable for 90% on room air.  Patient states that he felt winded coming up the stairs.  Denies any pleuritic chest pain, risk factors for venous thromboembolism. He is not vaccinated against COVID when questioned.         Review of patient's allergies indicates:  No Known Allergies  Past Medical History:   Diagnosis Date    Cataract     Diabetes mellitus, type 2     Diabetic retinopathy     Encounter for cosmetic surgery     GERD (gastroesophageal reflux disease)     Hyperlipidemia     Hypertension     Hypertrophy of nasal turbinates     Kidney stone 2009    Nasal obstruction     Nasal septal deformity     Unspecified disorder of kidney and ureter      Past Surgical History:   Procedure Laterality Date    COSMETIC SURGERY      Rhinoplasty    EXCISION TURBINATE, SUBMUCOUS      HERNIA REPAIR      Umbilical    LEFT HEART CATHETERIZATION N/A 10/5/2021    Procedure: Left heart cath;  Surgeon: Lawson Faulkner MD;  Location: [facility] CATH [facility];  Service: Cardiology;  Laterality: N/A;     NASAL SEPTUM SURGERY       Family History   Problem Relation Age of Onset    Hypertension Mother     Cataracts Mother     Heart disease Father     Diabetes Father     Cataracts Father     Stroke Father     Heart failure Father     Hyperlipidemia Father     Diabetes Brother     Hypertension Brother     Cancer Daughter         leukemia at 13yo    Kidney disease Maternal Grandmother     Heart disease Paternal Grandmother     Heart disease Paternal Grandfather     No Known Problems Maternal Grandfather     No Known Problems Sister     No Known Problems Maternal Aunt     No Known Problems Maternal Uncle     No Known Problems Paternal Aunt     No Known Problems Paternal Uncle     Blindness Neg Hx     Glaucoma Neg Hx     Macular degeneration Neg Hx     Retinal detachment Neg Hx     Acne Neg Hx     Eczema Neg Hx     Lupus Neg Hx     Psoriasis Neg Hx     Melanoma Neg Hx     Anesthesia problems Neg Hx     Amblyopia Neg Hx     Strabismus Neg Hx     Thyroid disease Neg Hx     Heart attack Neg Hx      Social History     Tobacco Use    Smoking status: Never Smoker    Smokeless tobacco: Never Used   Substance Use Topics    Alcohol use: Not Currently     Alcohol/week: 1.0 standard drink     Types: 1 Cans of beer per week     Comment: seldom..maybe once a month    Drug use: No     Review of Systems   Constitutional: Positive for fatigue.   Respiratory: Positive for cough and shortness of breath.    Cardiovascular: Negative for chest pain, palpitations and leg swelling.   Gastrointestinal: Negative for abdominal pain, nausea and vomiting.   Genitourinary: Negative for dysuria and frequency.   Musculoskeletal: Negative for back pain.   Neurological: Negative for dizziness and headaches.   Psychiatric/Behavioral: Negative for agitation and confusion.       Physical Exam     Initial Vitals [01/08/22 1100]   BP Pulse Resp Temp SpO2   (!) 159/73 97 (!) 22 99.1 °F (37.3 °C) (!) 90 %      MAP        --         Physical Exam    Nursing note and vitals reviewed.  Constitutional: He appears well-developed and well-nourished.   Well-appearing   Non toxic    HENT:   Head: Normocephalic and atraumatic.   Right Ear: External ear normal.   Left Ear: External ear normal.   Eyes: Conjunctivae and EOM are normal. Pupils are equal, round, and reactive to light.   Neck: Neck supple.   Normal range of motion.  Cardiovascular: Normal rate, regular rhythm, normal heart sounds and intact distal pulses.   Pulmonary/Chest: Breath sounds normal.   Abdominal: Abdomen is soft. Bowel sounds are normal.   Musculoskeletal:         General: Normal range of motion.      Cervical back: Normal range of motion and neck supple.     Neurological: He is alert and oriented to person, place, and time. He has normal strength. GCS score is 15. GCS eye subscore is 4. GCS verbal subscore is 5. GCS motor subscore is 6.   Skin: Skin is warm. Capillary refill takes less than 2 seconds.   Psychiatric: He has a normal mood and affect.         ED Course   Critical Care    Date/Time: 1/8/2022 2:44 PM  Performed by: Jeovany Ceja MD  Authorized by: Jeovany Ceja MD   Direct patient critical care time: 15 minutes  Additional history critical care time: 5 minutes  Ordering / reviewing critical care time: 5 minutes  Documentation critical care time: 5 minutes  Consulting other physicians critical care time: 5 minutes  Consult with family critical care time: 0 minutes  Other critical care time: 10 minutes  Total critical care time (exclusive of procedural time) : 45 minutes        Labs Reviewed   CBC W/ AUTO DIFFERENTIAL - Abnormal; Notable for the following components:       Result Value    MCH 31.7 (*)     Immature Granulocytes 0.7 (*)     Immature Grans (Abs) 0.05 (*)     Lymph # 0.5 (*)     Gran % 87.1 (*)     Lymph % 7.2 (*)     All other components within normal limits   COMPREHENSIVE METABOLIC PANEL - Abnormal; Notable for the following  components:    CO2 19 (*)     Glucose 204 (*)     Albumin 3.2 (*)     All other components within normal limits   C-REACTIVE PROTEIN - Abnormal; Notable for the following components:    .0 (*)     All other components within normal limits   FERRITIN - Abnormal; Notable for the following components:    Ferritin 742 (*)     All other components within normal limits   SARS-COV-2 RDRP GENE - Abnormal; Notable for the following components:    POC Rapid COVID Positive (*)     All other components within normal limits   INFLUENZA A & B BY MOLECULAR   LACTATE DEHYDROGENASE   CK   LACTIC ACID, PLASMA   TROPONIN I   POCT INFLUENZA A/B MOLECULAR   SARS-COV-2 RDRP GENE     EKG Readings: (Independently Interpreted)   Initial Reading: No STEMI. Rhythm: Normal Sinus Rhythm. Heart Rate: 85. Ectopy: No Ectopy. ST Segments: Normal ST Segments. T Waves: Normal.   NSR; no STEMI; no ischemic change        Imaging Results           X-Ray Chest PA And Lateral (Final result)  Result time 01/08/22 11:39:42    Final result by Jeancarlos Starkey MD (01/08/22 11:39:42)                 Impression:      Findings consistent with community acquired viral pneumonitis.    This report was flagged in Epic as abnormal.      Electronically signed by: Jeancarlos Starkey  Date:    01/08/2022  Time:    11:39             Narrative:    EXAMINATION:  XR CHEST PA AND LATERAL    CLINICAL HISTORY:  COVID-19    TECHNIQUE:  PA and lateral views of the chest were performed.    COMPARISON:  None    FINDINGS:  There are patchy infiltrates appreciated in the right and left lung parenchyma.  The majority infiltrates are seen on the right.  The appearance is highly suspicious for community-acquired viral pneumonitis.  The heart is normal in size and contour.  There is no indication of a pleural effusion.  No evidence of free air under the diaphragm.                                 Medications - No data to display  Medical Decision Making:   Clinical Tests:   Lab  Tests: Reviewed and Ordered  Radiological Study: Ordered and Reviewed  ED Management:  - patient COVID-19 positive; initially O2 sat 90% on room air; patient hovering around 92-93% on room air; patient and no acute distress; chest x-ray consistent with likely viral pneumonitis as the patient displays bilateral ground-glass opacities per my interpretation, this was confirmed by Radiology final read; nonspecific inflammatory markers elevated; no significant leukocytosis on the complete blood count; renal function appears to be within normal limits; no significant electrolyte abnormalities; in light of patient's age, multiple medical conditions, and hypoxia, will admit to the [facility] Medicine Service for further evaluation management; patient agreement plan for admission at this time             ED Course as of 01/08/22 1444   Sat Jan 08, 2022   1440 Discussed case with [facility] hospitalist, Dr. Conrad Aj, who will admit patient to his service for further evaluation management. [LC]      ED Course User Index  [LC] Jeovany Ceja MD             Clinical Impression:   Final diagnoses:  [U07.1] COVID-19 virus infection (Primary)  [U07.1] COVID-19  [R09.02] Hypoxia          ED Disposition Condition    Admit               Jeovany Ceja MD  01/08/22 1449

## 2022-01-08 NOTE — SUBJECTIVE & OBJECTIVE
Past Medical History:   Diagnosis Date    Cataract     Diabetes mellitus, type 2     Diabetic retinopathy     Encounter for cosmetic surgery     GERD (gastroesophageal reflux disease)     Hyperlipidemia     Hypertension     Hypertrophy of nasal turbinates     Kidney stone 2009    Nasal obstruction     Nasal septal deformity     Unspecified disorder of kidney and ureter        Past Surgical History:   Procedure Laterality Date    COSMETIC SURGERY      Rhinoplasty    EXCISION TURBINATE, SUBMUCOUS      HERNIA REPAIR      Umbilical    LEFT HEART CATHETERIZATION N/A 10/5/2021    Procedure: Left heart cath;  Surgeon: Lawson Faulkner MD;  Location: [facility] CATH LAB;  Service: Cardiology;  Laterality: N/A;    NASAL SEPTUM SURGERY         Review of patient's allergies indicates:  No Known Allergies    No current facility-administered medications on file prior to encounter.     Current Outpatient Medications on File Prior to Encounter   Medication Sig    alirocumab (PRALUENT PEN) 150 mg/mL PnIj Inject 1 mL (150 mg total) into the skin every 14 (fourteen) days.    APPLE CIDER VINEGAR ORAL Take by mouth.    aspirin (ECOTRIN) 81 MG EC tablet Take 81 mg by mouth once daily.    avanafiL (STENDRA) 200 mg Tab Take 200 mg by mouth as needed (prn ED). (Patient not taking: Reported on 11/3/2021)    clopidogreL (PLAVIX) 75 mg tablet Take 1 tablet (75 mg total) by mouth once daily.    diclofenac sodium (VOLTAREN) 1 % Gel Apply 2 g topically 3 (three) times daily.    ezetimibe (ZETIA) 10 mg tablet Take 1 tablet (10 mg total) by mouth once daily. (Patient not taking: Reported on 11/3/2021)    JARDIANCE 25 mg tablet TAKE 1 TABLET (25 MG TOTAL) BY MOUTH ONCE DAILY.    losartan (COZAAR) 25 MG tablet Take 1 tablet (25 mg total) by mouth once daily.    metFORMIN (GLUCOPHAGE) 1000 MG tablet TAKE 1 TABLET (1,000 MG TOTAL) BY MOUTH 2 (TWO) TIMES DAILY.     metoprolol succinate (TOPROL-XL) 25 MG 24 hr tablet Take 1 tablet  (25 mg total) by mouth once daily.    semaglutide (RYBELSUS) 7 mg tablet Take 1 tablet (7 mg total) by mouth once daily.    sildenafil (REVATIO) 20 mg Tab Take 3 tablets (60 mg total) by mouth as needed (ED).    vardenafiL (LEVITRA) 20 MG tablet Take 1 tablet (20 mg total) by mouth daily as needed for Erectile Dysfunction.    vardenafiL (STAXYN) 10 mg TbDL Take 10 mg by mouth once daily. (Patient not taking: Reported on 11/3/2021)     Family History     Problem Relation (Age of Onset)    Cancer Daughter    Cataracts Mother, Father    Diabetes Father, Brother    Heart disease Father, Paternal Grandmother, Paternal Grandfather    Heart failure Father    Hyperlipidemia Father    Hypertension Mother, Brother    Kidney disease Maternal Grandmother    No Known Problems Maternal Grandfather, Sister, Maternal Aunt, Maternal Uncle, Paternal Aunt, Paternal Uncle    Stroke Father        Tobacco Use    Smoking status: Never Smoker    Smokeless tobacco: Never Used   Substance and Sexual Activity    Alcohol use: Not Currently     Alcohol/week: 1.0 standard drink     Types: 1 Cans of beer per week     Comment: seldom..maybe once a month    Drug use: No    Sexual activity: Yes     Partners: Female     Review of Systems   Constitutional: Negative for chills, diaphoresis and fever.   HENT: Negative for congestion and sore throat.    Eyes: Negative for discharge and visual disturbance.   Respiratory: Positive for cough and shortness of breath.    Cardiovascular: Positive for chest pain. Negative for leg swelling.   Gastrointestinal: Negative for abdominal pain, nausea and vomiting.   Genitourinary: Negative for dysuria and flank pain.   Musculoskeletal: Negative for arthralgias and joint swelling.   Skin: Negative for rash and wound.   Allergic/Immunologic: Negative for immunocompromised state.   Neurological: Negative for light-headedness and headaches.   Psychiatric/Behavioral: Negative for agitation and confusion.      Objective:     Vital Signs (Most Recent):  Temp: 99.1 °F (37.3 °C) (01/08/22 1502)  Pulse: 95 (01/08/22 1703)  Resp: (!) 30 (01/08/22 1703)  BP: (!) 161/77 (01/08/22 1703)  SpO2: (!) 91 % (01/08/22 1703) Vital Signs (24h Range):  Temp:  [99.1 °F (37.3 °C)] 99.1 °F (37.3 °C)  Pulse:  [84-97] 95  Resp:  [22-30] 30  SpO2:  [90 %-94 %] 91 %  BP: (115-161)/(66-77) 161/77     Weight: 68 kg (150 lb)  Body mass index is 22.15 kg/m².    Physical Exam  Vitals reviewed.   Constitutional:       General: He is not in acute distress.     Appearance: He is well-developed and well-nourished. He is not diaphoretic.   HENT:      Head: Normocephalic and atraumatic.      Nose: Nose normal.   Eyes:      General: No scleral icterus.     Extraocular Movements: EOM normal.      Pupils: Pupils are equal, round, and reactive to light.   Neck:      Vascular: No JVD.      Trachea: No tracheal deviation.   Cardiovascular:      Rate and Rhythm: Normal rate and regular rhythm.      Heart sounds: Normal heart sounds. No murmur heard.  No friction rub. No gallop.    Pulmonary:      Effort: Pulmonary effort is normal. Tachypnea present. No respiratory distress.   Abdominal:      General: There is no distension.      Palpations: Abdomen is soft. There is no mass.      Tenderness: There is no abdominal tenderness.      Hernia: No hernia is present.   Musculoskeletal:         General: No deformity or edema.      Cervical back: Normal range of motion.   Skin:     General: Skin is warm and dry.      Findings: No rash.   Neurological:      Mental Status: He is alert and oriented to person, place, and time.   Psychiatric:         Mood and Affect: Mood and affect normal.         Behavior: Behavior normal.           CRANIAL NERVES     CN III, IV, VI   Pupils are equal, round, and reactive to light.  Extraocular motions are normal.        Significant Labs: All pertinent labs within the past 24 hours have been reviewed.    Significant Imaging: I have  reviewed all pertinent imaging results/findings within the past 24 hours.

## 2022-01-08 NOTE — H&P
[facility] Medicine  History & Physical    Patient Name: Darrell Hinds Jr.  MRN: [id number]  Patient Class: IP- Inpatient  Admission Date: 1/8/2022  Attending Physician: Godfrey Aj MD  Primary Care Provider: Elissa Navarro DO         Patient information was obtained from patient, spouse/SO, past medical records and ER records.     Subjective:     Principal Problem:Pneumonia due to COVID-19 virus    Chief Complaint:   Chief Complaint   Patient presents with    COVID-19 Concerns     Pt presents to ED with body aches, fatigue, back pain, loss of appetite, runny nose, sore throat, and cough for 5 days.         HPI: Mr. Hinds is a 60yo man with HTN, HLD, DM2, and CAD s/p PCI who presents with shortness of breath.  He states that he began to experience symptoms around New Year's with congestion, fevers, and fatigue.  His wife was also experiencing similar symptoms at that time, but she has improved while he has gotten worse.  Today, he was having difficulty breathing while walking up the stairs.  He reports chest pain radiating towards his back.  He is not vaccinated for COVID.      Past Medical History:   Diagnosis Date    Cataract     Diabetes mellitus, type 2     Diabetic retinopathy     Encounter for cosmetic surgery     GERD (gastroesophageal reflux disease)     Hyperlipidemia     Hypertension     Hypertrophy of nasal turbinates     Kidney stone 2009    Nasal obstruction     Nasal septal deformity     Unspecified disorder of kidney and ureter        Past Surgical History:   Procedure Laterality Date    COSMETIC SURGERY      Rhinoplasty    EXCISION TURBINATE, SUBMUCOUS      HERNIA REPAIR      Umbilical    LEFT HEART CATHETERIZATION N/A 10/5/2021    Procedure: Left heart cath;  Surgeon: Lawson Faulkner MD;  Location: [facility] CATH LAB;  Service: Cardiology;  Laterality: N/A;    NASAL SEPTUM SURGERY         Review of patient's allergies indicates:  No Known  Allergies    No current facility-administered medications on file prior to encounter.     Current Outpatient Medications on File Prior to Encounter   Medication Sig    alirocumab (PRALUENT PEN) 150 mg/mL PnIj Inject 1 mL (150 mg total) into the skin every 14 (fourteen) days.    APPLE CIDER VINEGAR ORAL Take by mouth.    aspirin (ECOTRIN) 81 MG EC tablet Take 81 mg by mouth once daily.    avanafiL (STENDRA) 200 mg Tab Take 200 mg by mouth as needed (prn ED). (Patient not taking: Reported on 11/3/2021)    clopidogreL (PLAVIX) 75 mg tablet Take 1 tablet (75 mg total) by mouth once daily.    diclofenac sodium (VOLTAREN) 1 % Gel Apply 2 g topically 3 (three) times daily.    ezetimibe (ZETIA) 10 mg tablet Take 1 tablet (10 mg total) by mouth once daily. (Patient not taking: Reported on 11/3/2021)    JARDIANCE 25 mg tablet TAKE 1 TABLET (25 MG TOTAL) BY MOUTH ONCE DAILY.    losartan (COZAAR) 25 MG tablet Take 1 tablet (25 mg total) by mouth once daily.    metFORMIN (GLUCOPHAGE) 1000 MG tablet TAKE 1 TABLET (1,000 MG TOTAL) BY MOUTH 2 (TWO) TIMES DAILY.     metoprolol succinate (TOPROL-XL) 25 MG 24 hr tablet Take 1 tablet (25 mg total) by mouth once daily.    semaglutide (RYBELSUS) 7 mg tablet Take 1 tablet (7 mg total) by mouth once daily.    sildenafil (REVATIO) 20 mg Tab Take 3 tablets (60 mg total) by mouth as needed (ED).    vardenafiL (LEVITRA) 20 MG tablet Take 1 tablet (20 mg total) by mouth daily as needed for Erectile Dysfunction.    vardenafiL (STAXYN) 10 mg TbDL Take 10 mg by mouth once daily. (Patient not taking: Reported on 11/3/2021)     Family History     Problem Relation (Age of Onset)    Cancer Daughter    Cataracts Mother, Father    Diabetes Father, Brother    Heart disease Father, Paternal Grandmother, Paternal Grandfather    Heart failure Father    Hyperlipidemia Father    Hypertension Mother, Brother    Kidney disease Maternal Grandmother    No Known Problems Maternal Grandfather,  Sister, Maternal Aunt, Maternal Uncle, Paternal Aunt, Paternal Uncle    Stroke Father        Tobacco Use    Smoking status: Never Smoker    Smokeless tobacco: Never Used   Substance and Sexual Activity    Alcohol use: Not Currently     Alcohol/week: 1.0 standard drink     Types: 1 Cans of beer per week     Comment: seldom..maybe once a month    Drug use: No    Sexual activity: Yes     Partners: Female     Review of Systems   Constitutional: Negative for chills, diaphoresis and fever.   HENT: Negative for congestion and sore throat.    Eyes: Negative for discharge and visual disturbance.   Respiratory: Positive for cough and shortness of breath.    Cardiovascular: Positive for chest pain. Negative for leg swelling.   Gastrointestinal: Negative for abdominal pain, nausea and vomiting.   Genitourinary: Negative for dysuria and flank pain.   Musculoskeletal: Negative for arthralgias and joint swelling.   Skin: Negative for rash and wound.   Allergic/Immunologic: Negative for immunocompromised state.   Neurological: Negative for light-headedness and headaches.   Psychiatric/Behavioral: Negative for agitation and confusion.     Objective:     Vital Signs (Most Recent):  Temp: 99.1 °F (37.3 °C) (01/08/22 1502)  Pulse: 95 (01/08/22 1703)  Resp: (!) 30 (01/08/22 1703)  BP: (!) 161/77 (01/08/22 1703)  SpO2: (!) 91 % (01/08/22 1703) Vital Signs (24h Range):  Temp:  [99.1 °F (37.3 °C)] 99.1 °F (37.3 °C)  Pulse:  [84-97] 95  Resp:  [22-30] 30  SpO2:  [90 %-94 %] 91 %  BP: (115-161)/(66-77) 161/77     Weight: 68 kg (150 lb)  Body mass index is 22.15 kg/m².    Physical Exam  Vitals reviewed.   Constitutional:       General: He is not in acute distress.     Appearance: He is well-developed and well-nourished. He is not diaphoretic.   HENT:      Head: Normocephalic and atraumatic.      Nose: Nose normal.   Eyes:      General: No scleral icterus.     Extraocular Movements: EOM normal.      Pupils: Pupils are equal, round, and  reactive to light.   Neck:      Vascular: No JVD.      Trachea: No tracheal deviation.   Cardiovascular:      Rate and Rhythm: Normal rate and regular rhythm.      Heart sounds: Normal heart sounds. No murmur heard.  No friction rub. No gallop.    Pulmonary:      Effort: Pulmonary effort is normal. Tachypnea present. No respiratory distress.   Abdominal:      General: There is no distension.      Palpations: Abdomen is soft. There is no mass.      Tenderness: There is no abdominal tenderness.      Hernia: No hernia is present.   Musculoskeletal:         General: No deformity or edema.      Cervical back: Normal range of motion.   Skin:     General: Skin is warm and dry.      Findings: No rash.   Neurological:      Mental Status: He is alert and oriented to person, place, and time.   Psychiatric:         Mood and Affect: Mood and affect normal.         Behavior: Behavior normal.           CRANIAL NERVES     CN III, IV, VI   Pupils are equal, round, and reactive to light.  Extraocular motions are normal.        Significant Labs: All pertinent labs within the past 24 hours have been reviewed.    Significant Imaging: I have reviewed all pertinent imaging results/findings within the past 24 hours.    Assessment/Plan:     * Pneumonia due to COVID-19 virus  - COVID positive; initiated on protocol  - CXR with infiltrate, requiring O2  - initiated on dexamethasone, will continue for 10d course  - initiated on remdesivir x5d; daily CMP  - lovenox ppx  - inhalers prn  - MVI, ascorbic acid, incentive spirometry  - statin  - supplemental O2, will wean as tolerated  - prn tylenol, loperamide  - contact/airborne    Acute hypoxemic respiratory failure  -secondary to COVID pneumonia  -see above      Coronary artery disease involving native coronary artery of native heart without angina pectoris  -status post recent PCI  -continue home aspirin, clopidogrel, metoprolol succinate, and losartan      Hyperlipidemia associated with type 2  diabetes mellitus  -intolerant of statins      Essential hypertension  -stable  -continue home metoprolol succinate and losartan      Type 2 diabetes mellitus with hyperglycemia  Hemoglobin A1C   Date Value Ref Range Status   07/22/2021 10.3 (H) 4.0 - 5.6 % Final     Comment:     ADA Screening Guidelines:  5.7-6.4%  Consistent with prediabetes  >or=6.5%  Consistent with diabetes    High levels of fetal hemoglobin interfere with the HbA1C  assay. Heterozygous hemoglobin variants (HbS, HgC, etc)do  not significantly interfere with this assay.   However, presence of multiple variants may affect accuracy.     03/23/2020 9.7 (H) 4.0 - 5.6 % Final     Comment:     ADA Screening Guidelines:  5.7-6.4%  Consistent with prediabetes  >or=6.5%  Consistent with diabetes  High levels of fetal hemoglobin interfere with the HbA1C  assay. Heterozygous hemoglobin variants (HbS, HgC, etc)do  not significantly interfere with this assay.   However, presence of multiple variants may affect accuracy.     12/30/2019 9.8 (H) 4.0 - 5.6 % Final     Comment:     ADA Screening Guidelines:  5.7-6.4%  Consistent with prediabetes  >or=6.5%  Consistent with diabetes  High levels of fetal hemoglobin interfere with the HbA1C  assay. Heterozygous hemoglobin variants (HbS, HgC, etc)do  not significantly interfere with this assay.   However, presence of multiple variants may affect accuracy.     12/30/2019 9.8 (H) 4.0 - 5.6 % Final     Comment:     ADA Screening Guidelines:  5.7-6.4%  Consistent with prediabetes  >or=6.5%  Consistent with diabetes  High levels of fetal hemoglobin interfere with the HbA1C  assay. Heterozygous hemoglobin variants (HbS, HgC, etc)do  not significantly interfere with this assay.   However, presence of multiple variants may affect accuracy.         - home regimen includes empagliflozin, metformin, semaglutide  - hold home oral medications  - initiate on detemir 5 u daily  - LDSSI with accuchecks qachs  - diabetic  diet      VTE Risk Mitigation (From admission, onward)         Ordered     enoxaparin injection 70 mg  2 times daily         01/08/22 1197                   Godfrey Aj MD  Department of Hospital Medicine   [facility]

## 2022-01-08 NOTE — ASSESSMENT & PLAN NOTE
Hemoglobin A1C   Date Value Ref Range Status   07/22/2021 10.3 (H) 4.0 - 5.6 % Final     Comment:     ADA Screening Guidelines:  5.7-6.4%  Consistent with prediabetes  >or=6.5%  Consistent with diabetes    High levels of fetal hemoglobin interfere with the HbA1C  assay. Heterozygous hemoglobin variants (HbS, HgC, etc)do  not significantly interfere with this assay.   However, presence of multiple variants may affect accuracy.     03/23/2020 9.7 (H) 4.0 - 5.6 % Final     Comment:     ADA Screening Guidelines:  5.7-6.4%  Consistent with prediabetes  >or=6.5%  Consistent with diabetes  High levels of fetal hemoglobin interfere with the HbA1C  assay. Heterozygous hemoglobin variants (HbS, HgC, etc)do  not significantly interfere with this assay.   However, presence of multiple variants may affect accuracy.     12/30/2019 9.8 (H) 4.0 - 5.6 % Final     Comment:     ADA Screening Guidelines:  5.7-6.4%  Consistent with prediabetes  >or=6.5%  Consistent with diabetes  High levels of fetal hemoglobin interfere with the HbA1C  assay. Heterozygous hemoglobin variants (HbS, HgC, etc)do  not significantly interfere with this assay.   However, presence of multiple variants may affect accuracy.     12/30/2019 9.8 (H) 4.0 - 5.6 % Final     Comment:     ADA Screening Guidelines:  5.7-6.4%  Consistent with prediabetes  >or=6.5%  Consistent with diabetes  High levels of fetal hemoglobin interfere with the HbA1C  assay. Heterozygous hemoglobin variants (HbS, HgC, etc)do  not significantly interfere with this assay.   However, presence of multiple variants may affect accuracy.         - home regimen includes empagliflozin, metformin, semaglutide  - hold home oral medications  - initiate on detemir 5 u daily  - LDSSI with accuchecks qachs  - diabetic diet

## 2022-01-08 NOTE — ASSESSMENT & PLAN NOTE
-status post recent PCI  -continue home aspirin, clopidogrel, metoprolol succinate, and losartan

## 2022-01-08 NOTE — ASSESSMENT & PLAN NOTE
- COVID positive; initiated on protocol  - CXR with infiltrate, requiring O2  - initiated on dexamethasone, will continue for 10d course  - initiated on remdesivir x5d; daily CMP  - lovenox ppx  - inhalers prn  - MVI, ascorbic acid, incentive spirometry  - statin  - supplemental O2, will wean as tolerated  - prn tylenol, loperamide  - contact/airborne

## 2022-01-08 NOTE — ED NOTES
Review of patient's allergies indicates:  No Known Allergies     Patient has verified the spelling of the name and  on armband.   APPEARANCE: Patient is alert, calm, oriented x 4, and does not appear distressed.  SKIN: Skin is normal for race, warm, and dry. Normal skin turgor and mucous membranes moist.  CARDIAC: Normal rate and rhythm, no murmur heard.   RESPIRATORY:Normal rate and effort. Breath sounds clear but diminished bilaterally throughout chest. Respirations are equal and unlabored.    GASTRO: Bowel sounds normal, abdomen is soft, no tenderness, and no abdominal distention.  MUSCLE: Full ROM. No bony tenderness or soft tissue tenderness. No obvious deformity. C/o chronic low back pain with Dr. Aj made aware.   PERIPHERAL VASCULAR: peripheral pulses present. Normal cap refill. No edema. Warm to touch.  NEURO: 5/5 strength major flexors/extensors bilaterally. Sensory intact to light touch bilaterally. Erma coma scale: eyes open spontaneously-4, oriented & converses-5, obeys commands-6. No neurological abnormalities.   MENTAL STATUS: awake, alert and aware of environment.  EYE: No overt deficits noted. No drainage. Sclera WNL  ENT: EARS: no obvious drainage. NOSE: no active bleeding. THROAT: no redness or swelling.  : Voids without complication

## 2022-01-08 NOTE — ED NOTES
Lab called asking about a flu swab, so looked through the chart and saw that a POC swab had been done and ws negative, so called and instructed them to cancel the one for lab.

## 2022-01-08 NOTE — HPI
Mr. Hinds is a 62yo man with HTN, HLD, DM2, and CAD s/p PCI who presents with shortness of breath.  He states that he began to experience symptoms around New Year's with congestion, fevers, and fatigue.  His wife was also experiencing similar symptoms at that time, but she has improved while he has gotten worse.  Today, he was having difficulty breathing while walking up the stairs.  He reports chest pain radiating towards his back.  He is not vaccinated for COVID.

## 2022-01-09 PROBLEM — K92.1 BLACK STOOL: Status: ACTIVE | Noted: 2022-01-09

## 2022-01-09 LAB
BASOPHILS # BLD AUTO: 0 K/UL (ref 0–0.2)
BASOPHILS # BLD AUTO: 0.01 K/UL (ref 0–0.2)
BASOPHILS NFR BLD: 0 % (ref 0–1.9)
BASOPHILS NFR BLD: 0.1 % (ref 0–1.9)
DIFFERENTIAL METHOD: ABNORMAL
DIFFERENTIAL METHOD: ABNORMAL
EOSINOPHIL # BLD AUTO: 0 K/UL (ref 0–0.5)
EOSINOPHIL # BLD AUTO: 0 K/UL (ref 0–0.5)
EOSINOPHIL NFR BLD: 0 % (ref 0–8)
EOSINOPHIL NFR BLD: 0 % (ref 0–8)
ERYTHROCYTE [DISTWIDTH] IN BLOOD BY AUTOMATED COUNT: 13 % (ref 11.5–14.5)
ERYTHROCYTE [DISTWIDTH] IN BLOOD BY AUTOMATED COUNT: 13.2 % (ref 11.5–14.5)
HCT VFR BLD AUTO: 40 % (ref 40–54)
HCT VFR BLD AUTO: 41.3 % (ref 40–54)
HGB BLD-MCNC: 13.6 G/DL (ref 14–18)
HGB BLD-MCNC: 13.9 G/DL (ref 14–18)
IMM GRANULOCYTES # BLD AUTO: 0.04 K/UL (ref 0–0.04)
IMM GRANULOCYTES # BLD AUTO: 0.08 K/UL (ref 0–0.04)
IMM GRANULOCYTES NFR BLD AUTO: 0.7 % (ref 0–0.5)
IMM GRANULOCYTES NFR BLD AUTO: 1.2 % (ref 0–0.5)
LYMPHOCYTES # BLD AUTO: 0.7 K/UL (ref 1–4.8)
LYMPHOCYTES # BLD AUTO: 0.7 K/UL (ref 1–4.8)
LYMPHOCYTES NFR BLD: 11.7 % (ref 18–48)
LYMPHOCYTES NFR BLD: 9.9 % (ref 18–48)
MCH RBC QN AUTO: 30.8 PG (ref 27–31)
MCH RBC QN AUTO: 31.6 PG (ref 27–31)
MCHC RBC AUTO-ENTMCNC: 33.7 G/DL (ref 32–36)
MCHC RBC AUTO-ENTMCNC: 34 G/DL (ref 32–36)
MCV RBC AUTO: 92 FL (ref 82–98)
MCV RBC AUTO: 93 FL (ref 82–98)
MONOCYTES # BLD AUTO: 0.2 K/UL (ref 0.3–1)
MONOCYTES # BLD AUTO: 0.3 K/UL (ref 0.3–1)
MONOCYTES NFR BLD: 3.6 % (ref 4–15)
MONOCYTES NFR BLD: 4.7 % (ref 4–15)
NEUTROPHILS # BLD AUTO: 5 K/UL (ref 1.8–7.7)
NEUTROPHILS # BLD AUTO: 5.7 K/UL (ref 1.8–7.7)
NEUTROPHILS NFR BLD: 84 % (ref 38–73)
NEUTROPHILS NFR BLD: 84.1 % (ref 38–73)
NRBC BLD-RTO: 0 /100 WBC
NRBC BLD-RTO: 0 /100 WBC
PLATELET # BLD AUTO: 210 K/UL (ref 150–450)
PLATELET # BLD AUTO: 234 K/UL (ref 150–450)
PMV BLD AUTO: 10.1 FL (ref 9.2–12.9)
PMV BLD AUTO: 10.2 FL (ref 9.2–12.9)
POCT GLUCOSE: 157 MG/DL (ref 70–110)
POCT GLUCOSE: 167 MG/DL (ref 70–110)
POCT GLUCOSE: 231 MG/DL (ref 70–110)
POCT GLUCOSE: 249 MG/DL (ref 70–110)
RBC # BLD AUTO: 4.31 M/UL (ref 4.6–6.2)
RBC # BLD AUTO: 4.51 M/UL (ref 4.6–6.2)
WBC # BLD AUTO: 5.9 K/UL (ref 3.9–12.7)
WBC # BLD AUTO: 6.76 K/UL (ref 3.9–12.7)

## 2022-01-09 PROCEDURE — 11000001 HC ACUTE MED/SURG PRIVATE ROOM

## 2022-01-09 PROCEDURE — 25000003 PHARM REV CODE 250: Performed by: HOSPITALIST

## 2022-01-09 PROCEDURE — 63600175 PHARM REV CODE 636 W HCPCS: Performed by: HOSPITALIST

## 2022-01-09 PROCEDURE — 36415 COLL VENOUS BLD VENIPUNCTURE: CPT | Performed by: HOSPITALIST

## 2022-01-09 PROCEDURE — 99900035 HC TECH TIME PER 15 MIN (STAT)

## 2022-01-09 PROCEDURE — 94761 N-INVAS EAR/PLS OXIMETRY MLT: CPT

## 2022-01-09 PROCEDURE — 85025 COMPLETE CBC W/AUTO DIFF WBC: CPT | Mod: 91 | Performed by: HOSPITALIST

## 2022-01-09 PROCEDURE — 27000207 HC ISOLATION

## 2022-01-09 PROCEDURE — 27000221 HC OXYGEN, UP TO 24 HOURS

## 2022-01-09 PROCEDURE — 94799 UNLISTED PULMONARY SVC/PX: CPT

## 2022-01-09 PROCEDURE — 94640 AIRWAY INHALATION TREATMENT: CPT

## 2022-01-09 RX ADMIN — INSULIN ASPART 2 UNITS: 100 INJECTION, SOLUTION INTRAVENOUS; SUBCUTANEOUS at 05:01

## 2022-01-09 RX ADMIN — DEXAMETHASONE 6 MG: 4 TABLET ORAL at 08:01

## 2022-01-09 RX ADMIN — REMDESIVIR 100 MG: 100 INJECTION, POWDER, LYOPHILIZED, FOR SOLUTION INTRAVENOUS at 08:01

## 2022-01-09 RX ADMIN — ALBUTEROL SULFATE 2 PUFF: 90 AEROSOL, METERED RESPIRATORY (INHALATION) at 07:01

## 2022-01-09 RX ADMIN — ALBUTEROL SULFATE 2 PUFF: 90 AEROSOL, METERED RESPIRATORY (INHALATION) at 01:01

## 2022-01-09 RX ADMIN — THERA TABS 1 TABLET: TAB at 08:01

## 2022-01-09 RX ADMIN — INSULIN ASPART 1 UNITS: 100 INJECTION, SOLUTION INTRAVENOUS; SUBCUTANEOUS at 08:01

## 2022-01-09 RX ADMIN — OXYCODONE HYDROCHLORIDE AND ACETAMINOPHEN 500 MG: 500 TABLET ORAL at 08:01

## 2022-01-09 RX ADMIN — ALBUTEROL SULFATE 2 PUFF: 90 AEROSOL, METERED RESPIRATORY (INHALATION) at 12:01

## 2022-01-09 RX ADMIN — ALBUTEROL SULFATE 2 PUFF: 90 AEROSOL, METERED RESPIRATORY (INHALATION) at 08:01

## 2022-01-09 RX ADMIN — DICLOFENAC SODIUM 2 G: 10 GEL TOPICAL at 08:01

## 2022-01-09 RX ADMIN — ENOXAPARIN SODIUM 70 MG: 80 INJECTION SUBCUTANEOUS at 08:01

## 2022-01-09 RX ADMIN — INSULIN DETEMIR 5 UNITS: 100 INJECTION, SOLUTION SUBCUTANEOUS at 08:01

## 2022-01-09 RX ADMIN — ASPIRIN 81 MG: 81 TABLET, COATED ORAL at 08:01

## 2022-01-09 RX ADMIN — METOPROLOL SUCCINATE 25 MG: 25 TABLET, EXTENDED RELEASE ORAL at 08:01

## 2022-01-09 RX ADMIN — CLOPIDOGREL 75 MG: 75 TABLET, FILM COATED ORAL at 08:01

## 2022-01-09 RX ADMIN — LOSARTAN POTASSIUM 25 MG: 25 TABLET, FILM COATED ORAL at 08:01

## 2022-01-09 NOTE — SUBJECTIVE & OBJECTIVE
Interval History:  States that overall he feels better than yesterday with improving shortness of breath and chest discomfort.  Does report that he had 1 black stool yesterday.  Denies history of prior bleeding and no diarrhea.    Review of Systems   Constitutional: Negative for fever.   Respiratory: Positive for shortness of breath (Improving).    Cardiovascular: Positive for chest pain ( improving).   Gastrointestinal: Positive for blood in stool.     Objective:     Vital Signs (Most Recent):  Temp: 97.1 °F (36.2 °C) (01/09/22 1055)  Pulse: 77 (01/09/22 1352)  Resp: 18 (01/09/22 1352)  BP: 128/67 (01/09/22 1055)  SpO2: 95 % (01/09/22 1352) Vital Signs (24h Range):  Temp:  [96.1 °F (35.6 °C)-101.7 °F (38.7 °C)] 97.1 °F (36.2 °C)  Pulse:  [] 77  Resp:  [18-30] 18  SpO2:  [91 %-98 %] 95 %  BP: (113-161)/(63-85) 128/67     Weight: 69 kg (152 lb 1.9 oz)  Body mass index is 22.46 kg/m².    Intake/Output Summary (Last 24 hours) at 1/9/2022 1418  Last data filed at 1/8/2022 1835  Gross per 24 hour   Intake 250.35 ml   Output --   Net 250.35 ml      Physical Exam  Vitals reviewed.   Constitutional:       General: He is not in acute distress.     Appearance: He is well-developed. He is not diaphoretic.   HENT:      Head: Normocephalic and atraumatic.      Nose: Nose normal.   Eyes:      General: No scleral icterus.     Pupils: Pupils are equal, round, and reactive to light.   Neck:      Vascular: No JVD.      Trachea: No tracheal deviation.   Cardiovascular:      Rate and Rhythm: Normal rate and regular rhythm.      Heart sounds: Normal heart sounds. No murmur heard.  No friction rub. No gallop.    Pulmonary:      Effort: Pulmonary effort is normal. Tachypnea present. No respiratory distress.      Comments: On 2 L  Abdominal:      General: There is no distension.      Palpations: Abdomen is soft. There is no mass.      Tenderness: There is no abdominal tenderness.      Hernia: No hernia is present.   Musculoskeletal:          General: No deformity.      Cervical back: Normal range of motion.   Skin:     General: Skin is warm and dry.      Findings: No rash.   Neurological:      Mental Status: He is alert and oriented to person, place, and time.   Psychiatric:         Behavior: Behavior normal.         Significant Labs: All pertinent labs within the past 24 hours have been reviewed.    Significant Imaging: I have reviewed all pertinent imaging results/findings within the past 24 hours.

## 2022-01-09 NOTE — PROGRESS NOTES
[facility] Medicine  Progress Note    Patient Name: Darrell Hinds Jr.  MRN: [id number]  Patient Class: IP- Inpatient   Admission Date: 1/8/2022  Length of Stay: 1 days  Attending Physician: Godfrye Aj, *  Primary Care Provider: Elissa Navarro DO        Subjective:     Principal Problem:Pneumonia due to COVID-19 virus        HPI:  Mr. Hinds is a 62yo man with HTN, HLD, DM2, and CAD s/p PCI who presents with shortness of breath.  He states that he began to experience symptoms around New Year's with congestion, fevers, and fatigue.  His wife was also experiencing similar symptoms at that time, but she has improved while he has gotten worse.  Today, he was having difficulty breathing while walking up the stairs.  He reports chest pain radiating towards his back.  He is not vaccinated for COVID.      Overview/Hospital Course:  No notes on file    Interval History:  States that overall he feels better than yesterday with improving shortness of breath and chest discomfort.  Does report that he had 1 black stool yesterday.  Denies history of prior bleeding and no diarrhea.    Review of Systems   Constitutional: Negative for fever.   Respiratory: Positive for shortness of breath (Improving).    Cardiovascular: Positive for chest pain ( improving).   Gastrointestinal: Positive for blood in stool.     Objective:     Vital Signs (Most Recent):  Temp: 97.1 °F (36.2 °C) (01/09/22 1055)  Pulse: 77 (01/09/22 1352)  Resp: 18 (01/09/22 1352)  BP: 128/67 (01/09/22 1055)  SpO2: 95 % (01/09/22 1352) Vital Signs (24h Range):  Temp:  [96.1 °F (35.6 °C)-101.7 °F (38.7 °C)] 97.1 °F (36.2 °C)  Pulse:  [] 77  Resp:  [18-30] 18  SpO2:  [91 %-98 %] 95 %  BP: (113-161)/(63-85) 128/67     Weight: 69 kg (152 lb 1.9 oz)  Body mass index is 22.46 kg/m².    Intake/Output Summary (Last 24 hours) at 1/9/2022 1418  Last data filed at 1/8/2022 1835  Gross per 24 hour   Intake 250.35 ml   Output --   Net 250.35 ml       Physical Exam  Vitals reviewed.   Constitutional:       General: He is not in acute distress.     Appearance: He is well-developed. He is not diaphoretic.   HENT:      Head: Normocephalic and atraumatic.      Nose: Nose normal.   Eyes:      General: No scleral icterus.     Pupils: Pupils are equal, round, and reactive to light.   Neck:      Vascular: No JVD.      Trachea: No tracheal deviation.   Cardiovascular:      Rate and Rhythm: Normal rate and regular rhythm.      Heart sounds: Normal heart sounds. No murmur heard.  No friction rub. No gallop.    Pulmonary:      Effort: Pulmonary effort is normal. Tachypnea present. No respiratory distress.      Comments: On 2 L  Abdominal:      General: There is no distension.      Palpations: Abdomen is soft. There is no mass.      Tenderness: There is no abdominal tenderness.      Hernia: No hernia is present.   Musculoskeletal:         General: No deformity.      Cervical back: Normal range of motion.   Skin:     General: Skin is warm and dry.      Findings: No rash.   Neurological:      Mental Status: He is alert and oriented to person, place, and time.   Psychiatric:         Behavior: Behavior normal.         Significant Labs: All pertinent labs within the past 24 hours have been reviewed.    Significant Imaging: I have reviewed all pertinent imaging results/findings within the past 24 hours.      Assessment/Plan:      * Pneumonia due to COVID-19 virus  - COVID positive; initiated on protocol  - CXR with infiltrate, requiring O2  - initiated on dexamethasone, will continue for 10d course  - initiated on remdesivir x5d; daily CMP  - lovenox ppx  - inhalers prn  - MVI, ascorbic acid, incentive spirometry  - statin  - supplemental O2, will wean as tolerated  - prn tylenol, loperamide  - contact/airborne    Black stool  -checked CBC and stable  -FOBT  -continue to monitor today    Acute hypoxemic respiratory failure  -secondary to COVID pneumonia  -see  above      Coronary artery disease involving native coronary artery of native heart without angina pectoris  -status post recent PCI  -continue home aspirin, clopidogrel, metoprolol succinate, and losartan      Hyperlipidemia associated with type 2 diabetes mellitus  -intolerant of statins      Essential hypertension  -stable  -continue home metoprolol succinate and losartan      Type 2 diabetes mellitus with hyperglycemia  Hemoglobin A1C   Date Value Ref Range Status   07/22/2021 10.3 (H) 4.0 - 5.6 % Final     Comment:     ADA Screening Guidelines:  5.7-6.4%  Consistent with prediabetes  >or=6.5%  Consistent with diabetes    High levels of fetal hemoglobin interfere with the HbA1C  assay. Heterozygous hemoglobin variants (HbS, HgC, etc)do  not significantly interfere with this assay.   However, presence of multiple variants may affect accuracy.     03/23/2020 9.7 (H) 4.0 - 5.6 % Final     Comment:     ADA Screening Guidelines:  5.7-6.4%  Consistent with prediabetes  >or=6.5%  Consistent with diabetes  High levels of fetal hemoglobin interfere with the HbA1C  assay. Heterozygous hemoglobin variants (HbS, HgC, etc)do  not significantly interfere with this assay.   However, presence of multiple variants may affect accuracy.     12/30/2019 9.8 (H) 4.0 - 5.6 % Final     Comment:     ADA Screening Guidelines:  5.7-6.4%  Consistent with prediabetes  >or=6.5%  Consistent with diabetes  High levels of fetal hemoglobin interfere with the HbA1C  assay. Heterozygous hemoglobin variants (HbS, HgC, etc)do  not significantly interfere with this assay.   However, presence of multiple variants may affect accuracy.     12/30/2019 9.8 (H) 4.0 - 5.6 % Final     Comment:     ADA Screening Guidelines:  5.7-6.4%  Consistent with prediabetes  >or=6.5%  Consistent with diabetes  High levels of fetal hemoglobin interfere with the HbA1C  assay. Heterozygous hemoglobin variants (HbS, HgC, etc)do  not significantly interfere with this assay.    However, presence of multiple variants may affect accuracy.         - home regimen includes empagliflozin, metformin, semaglutide  - hold home oral medications  - continue on detemir 5 u daily  - LDSSI with accuchecks qachs  - diabetic diet      VTE Risk Mitigation (From admission, onward)         Ordered     enoxaparin injection 70 mg  2 times daily         01/08/22 1504                Discharge Planning   SHAKIRA:      Code Status: Full Code   Is the patient medically ready for discharge?:     Reason for patient still in hospital (select all that apply): Patient trending condition and Treatment                     Godfrey Aj MD  Department of Hospital Medicine   [place] - Telemetry

## 2022-01-09 NOTE — NURSING
Patient care plans reviewed. No c/o of pain, chest pains or SOB. Patient has order for occult blood sample, unable to collect. Safety maintained, call light within reach, bed in lowest position, bed alarm on, patient educated to call when he needs assistance. Will continue to monitor.

## 2022-01-09 NOTE — ASSESSMENT & PLAN NOTE
-stable  -continue home metoprolol succinate and losartan     diffuse tenderness w guarding but no rebound, most tender llq

## 2022-01-09 NOTE — ASSESSMENT & PLAN NOTE
Hemoglobin A1C   Date Value Ref Range Status   07/22/2021 10.3 (H) 4.0 - 5.6 % Final     Comment:     ADA Screening Guidelines:  5.7-6.4%  Consistent with prediabetes  >or=6.5%  Consistent with diabetes    High levels of fetal hemoglobin interfere with the HbA1C  assay. Heterozygous hemoglobin variants (HbS, HgC, etc)do  not significantly interfere with this assay.   However, presence of multiple variants may affect accuracy.     03/23/2020 9.7 (H) 4.0 - 5.6 % Final     Comment:     ADA Screening Guidelines:  5.7-6.4%  Consistent with prediabetes  >or=6.5%  Consistent with diabetes  High levels of fetal hemoglobin interfere with the HbA1C  assay. Heterozygous hemoglobin variants (HbS, HgC, etc)do  not significantly interfere with this assay.   However, presence of multiple variants may affect accuracy.     12/30/2019 9.8 (H) 4.0 - 5.6 % Final     Comment:     ADA Screening Guidelines:  5.7-6.4%  Consistent with prediabetes  >or=6.5%  Consistent with diabetes  High levels of fetal hemoglobin interfere with the HbA1C  assay. Heterozygous hemoglobin variants (HbS, HgC, etc)do  not significantly interfere with this assay.   However, presence of multiple variants may affect accuracy.     12/30/2019 9.8 (H) 4.0 - 5.6 % Final     Comment:     ADA Screening Guidelines:  5.7-6.4%  Consistent with prediabetes  >or=6.5%  Consistent with diabetes  High levels of fetal hemoglobin interfere with the HbA1C  assay. Heterozygous hemoglobin variants (HbS, HgC, etc)do  not significantly interfere with this assay.   However, presence of multiple variants may affect accuracy.         - home regimen includes empagliflozin, metformin, semaglutide  - hold home oral medications  - continue on detemir 5 u daily  - LDSSI with accuchecks qachs  - diabetic diet

## 2022-01-09 NOTE — PLAN OF CARE
"Patient got in from ER @ 2020 hrs. Oriented the patient to the unit and the nurse call light system. Vital signs recorded as Temp:99, HR:81, Resp:20, SpO2:93% and BP:132/70. Plan of care reviewed with the patient. Scheduled medicines given and the patient tolerated well. Fall and safety interventions are in place. On 2 L Oxygen saturating 96%. Accu check was 242 mg/dl, given 1 Unit Insulin Aspart. On Telemetry monitoring with NSR, no true "red alarms" noted. Advised the patient to call for assistance. No acute distress noted in the shift. Continued monitoring the patient.  "

## 2022-01-09 NOTE — PLAN OF CARE
Patient on oxygen with documented flow.  Will attempt to wean per O2 order protocol. . The proper method of use, as well as anticipated side effects, of this metered-dose inhaler are discussed and demonstrated to the patient.

## 2022-01-09 NOTE — PLAN OF CARE
01/08/22 2200   Admission   Initial VN Admission Questions Complete   Communication Issues? None   Shift   Virtual Nurse - Rounding Complete   Pain Management Interventions relaxation techniques promoted;quiet environment facilitated   Virtual Nurse - Patient Verbalized Approval Of VN Rounding;Camera Use   Type of Frequent Check   Type Patient Rounds   Safety/Activity   Patient Rounds bed in low position;ID band on;placement of personal items at bedside;bed wheels locked;call light in patient/parent reach;clutter free environment maintained;visualized patient   Safety Promotion/Fall Prevention assistive device/personal item within reach;bed alarm set;instructed to call staff for mobility;nonskid shoes/socks when out of bed;side rails raised x 2;Fall Risk signage in place   Safety Precautions emergency equipment at bedside   Activity Management Ambulated in room - L4   Positioning   Body Position position changed independently   Head of Bed (HOB) Positioning HOB elevated   VN cued into room to complete admit assessment. VIP model introduced; VN working alongside bedside treatment team.  Plan of care reviewed with patient. Patient informed of fall risk, fall precautions, call light within reach, side rails x2 elevated. Patient notified to ask staff for assistance. Patient verbalized complete understanding. Time allowed for questions. Will continue to monitor and intervene as needed.

## 2022-01-10 ENCOUNTER — TELEPHONE (OUTPATIENT)
Dept: INTERNAL MEDICINE | Facility: CLINIC | Age: 62
End: 2022-01-10
Payer: COMMERCIAL

## 2022-01-10 ENCOUNTER — PATIENT MESSAGE (OUTPATIENT)
Dept: ADMINISTRATIVE | Facility: CLINIC | Age: 62
End: 2022-01-10
Payer: COMMERCIAL

## 2022-01-10 VITALS
HEART RATE: 72 BPM | WEIGHT: 154.56 LBS | SYSTOLIC BLOOD PRESSURE: 141 MMHG | DIASTOLIC BLOOD PRESSURE: 75 MMHG | BODY MASS INDEX: 22.89 KG/M2 | HEIGHT: 69 IN | OXYGEN SATURATION: 93 % | TEMPERATURE: 98 F | RESPIRATION RATE: 18 BRPM

## 2022-01-10 LAB
ANION GAP SERPL CALC-SCNC: 15 MMOL/L (ref 8–16)
BASOPHILS # BLD AUTO: 0.01 K/UL (ref 0–0.2)
BASOPHILS NFR BLD: 0.1 % (ref 0–1.9)
BUN SERPL-MCNC: 25 MG/DL (ref 8–23)
CALCIUM SERPL-MCNC: 9.3 MG/DL (ref 8.7–10.5)
CHLORIDE SERPL-SCNC: 103 MMOL/L (ref 95–110)
CO2 SERPL-SCNC: 19 MMOL/L (ref 23–29)
CREAT SERPL-MCNC: 0.9 MG/DL (ref 0.5–1.4)
CRP SERPL-MCNC: 133.1 MG/L (ref 0–8.2)
DIFFERENTIAL METHOD: ABNORMAL
EOSINOPHIL # BLD AUTO: 0 K/UL (ref 0–0.5)
EOSINOPHIL NFR BLD: 0 % (ref 0–8)
ERYTHROCYTE [DISTWIDTH] IN BLOOD BY AUTOMATED COUNT: 13 % (ref 11.5–14.5)
EST. GFR  (AFRICAN AMERICAN): >60 ML/MIN/1.73 M^2
EST. GFR  (NON AFRICAN AMERICAN): >60 ML/MIN/1.73 M^2
FERRITIN SERPL-MCNC: 886 NG/ML (ref 20–300)
GLUCOSE SERPL-MCNC: 132 MG/DL (ref 70–110)
HCT VFR BLD AUTO: 39.2 % (ref 40–54)
HGB BLD-MCNC: 13.5 G/DL (ref 14–18)
IMM GRANULOCYTES # BLD AUTO: 0.07 K/UL (ref 0–0.04)
IMM GRANULOCYTES NFR BLD AUTO: 0.9 % (ref 0–0.5)
LYMPHOCYTES # BLD AUTO: 1.1 K/UL (ref 1–4.8)
LYMPHOCYTES NFR BLD: 13.7 % (ref 18–48)
MCH RBC QN AUTO: 31.5 PG (ref 27–31)
MCHC RBC AUTO-ENTMCNC: 34.4 G/DL (ref 32–36)
MCV RBC AUTO: 92 FL (ref 82–98)
MONOCYTES # BLD AUTO: 0.6 K/UL (ref 0.3–1)
MONOCYTES NFR BLD: 7.5 % (ref 4–15)
NEUTROPHILS # BLD AUTO: 6 K/UL (ref 1.8–7.7)
NEUTROPHILS NFR BLD: 77.8 % (ref 38–73)
NRBC BLD-RTO: 0 /100 WBC
PLATELET # BLD AUTO: 272 K/UL (ref 150–450)
PMV BLD AUTO: 10.2 FL (ref 9.2–12.9)
POCT GLUCOSE: 158 MG/DL (ref 70–110)
POCT GLUCOSE: 279 MG/DL (ref 70–110)
POTASSIUM SERPL-SCNC: 4.8 MMOL/L (ref 3.5–5.1)
RBC # BLD AUTO: 4.28 M/UL (ref 4.6–6.2)
SODIUM SERPL-SCNC: 137 MMOL/L (ref 136–145)
WBC # BLD AUTO: 7.76 K/UL (ref 3.9–12.7)

## 2022-01-10 PROCEDURE — 63600175 PHARM REV CODE 636 W HCPCS: Performed by: HOSPITALIST

## 2022-01-10 PROCEDURE — 82728 ASSAY OF FERRITIN: CPT | Performed by: STUDENT IN AN ORGANIZED HEALTH CARE EDUCATION/TRAINING PROGRAM

## 2022-01-10 PROCEDURE — 25000003 PHARM REV CODE 250: Performed by: STUDENT IN AN ORGANIZED HEALTH CARE EDUCATION/TRAINING PROGRAM

## 2022-01-10 PROCEDURE — 94640 AIRWAY INHALATION TREATMENT: CPT

## 2022-01-10 PROCEDURE — 94799 UNLISTED PULMONARY SVC/PX: CPT

## 2022-01-10 PROCEDURE — 36415 COLL VENOUS BLD VENIPUNCTURE: CPT | Performed by: STUDENT IN AN ORGANIZED HEALTH CARE EDUCATION/TRAINING PROGRAM

## 2022-01-10 PROCEDURE — 86140 C-REACTIVE PROTEIN: CPT | Performed by: STUDENT IN AN ORGANIZED HEALTH CARE EDUCATION/TRAINING PROGRAM

## 2022-01-10 PROCEDURE — 36415 COLL VENOUS BLD VENIPUNCTURE: CPT | Performed by: HOSPITALIST

## 2022-01-10 PROCEDURE — 85025 COMPLETE CBC W/AUTO DIFF WBC: CPT | Performed by: HOSPITALIST

## 2022-01-10 PROCEDURE — 80048 BASIC METABOLIC PNL TOTAL CA: CPT | Performed by: STUDENT IN AN ORGANIZED HEALTH CARE EDUCATION/TRAINING PROGRAM

## 2022-01-10 PROCEDURE — 94761 N-INVAS EAR/PLS OXIMETRY MLT: CPT

## 2022-01-10 PROCEDURE — 25000003 PHARM REV CODE 250: Performed by: HOSPITALIST

## 2022-01-10 RX ORDER — CALCIUM CARBONATE 200(500)MG
500 TABLET,CHEWABLE ORAL 3 TIMES DAILY PRN
Status: DISCONTINUED | OUTPATIENT
Start: 2022-01-10 | End: 2022-01-10 | Stop reason: HOSPADM

## 2022-01-10 RX ADMIN — ALBUTEROL SULFATE 2 PUFF: 90 AEROSOL, METERED RESPIRATORY (INHALATION) at 07:01

## 2022-01-10 RX ADMIN — ENOXAPARIN SODIUM 70 MG: 80 INJECTION SUBCUTANEOUS at 09:01

## 2022-01-10 RX ADMIN — LOSARTAN POTASSIUM 25 MG: 25 TABLET, FILM COATED ORAL at 09:01

## 2022-01-10 RX ADMIN — DEXAMETHASONE 6 MG: 4 TABLET ORAL at 09:01

## 2022-01-10 RX ADMIN — REMDESIVIR 100 MG: 100 INJECTION, POWDER, LYOPHILIZED, FOR SOLUTION INTRAVENOUS at 09:01

## 2022-01-10 RX ADMIN — ASPIRIN 81 MG: 81 TABLET, COATED ORAL at 09:01

## 2022-01-10 RX ADMIN — CALCIUM CARBONATE (ANTACID) CHEW TAB 500 MG 500 MG: 500 CHEW TAB at 09:01

## 2022-01-10 RX ADMIN — THERA TABS 1 TABLET: TAB at 09:01

## 2022-01-10 RX ADMIN — ALBUTEROL SULFATE 2 PUFF: 90 AEROSOL, METERED RESPIRATORY (INHALATION) at 01:01

## 2022-01-10 RX ADMIN — ALBUTEROL SULFATE 2 PUFF: 90 AEROSOL, METERED RESPIRATORY (INHALATION) at 12:01

## 2022-01-10 RX ADMIN — INSULIN ASPART 3 UNITS: 100 INJECTION, SOLUTION INTRAVENOUS; SUBCUTANEOUS at 12:01

## 2022-01-10 RX ADMIN — METOPROLOL SUCCINATE 25 MG: 25 TABLET, EXTENDED RELEASE ORAL at 09:01

## 2022-01-10 RX ADMIN — OXYCODONE HYDROCHLORIDE AND ACETAMINOPHEN 500 MG: 500 TABLET ORAL at 09:01

## 2022-01-10 NOTE — PLAN OF CARE
Plan of care reviewed with the patient . Understanding verbalized.No acute distress observed.Safety Maintained.Bed at lowest position.Call bell at reach.Bed side table at reach.Instructed Patient to call for assistance whenever required.     Problem: Adult Inpatient Plan of Care  Goal: Plan of Care Review  Outcome: Ongoing, Progressing  Goal: Patient-Specific Goal (Individualized)  Outcome: Ongoing, Progressing  Goal: Absence of Hospital-Acquired Illness or Injury  Outcome: Ongoing, Progressing  Goal: Optimal Comfort and Wellbeing  Outcome: Ongoing, Progressing

## 2022-01-10 NOTE — TELEPHONE ENCOUNTER
----- Message from Anne Wade sent at 1/10/2022 11:33 AM CST -----  Regarding: Hosp f/u  Patient is discharging from [facility] and is needing a hospital f/u.  Patient was COVID POSITIVE on 1/8.  Please schedule Hospital f/u an message me back so that Discharge Nurse can advise patient prior to discharge.    DX: Pneumonia due to COVID-19 virus/COVID POSITIVE 1/8.    Thanks, Marva  Access Navigator/Discharge

## 2022-01-10 NOTE — PLAN OF CARE
"Pharmacist will go over home medications and reasons for medications. VN and bedside nurse to reiterate final discharge instructions.     Future Appointments   Date Time Provider Department Center   1/12/2022 11:30 AM NABEEL Crane II, MD [facility]   1/25/2022 10:00 AM Benji Cutler MD [facility] [place]   2/1/2022  8:20 AM Miles Amador MD [facility]     BP (!) 141/75 (BP Location: Left arm, Patient Position: Lying)   Pulse 80   Temp 97.7 °F (36.5 °C) (Oral)   Resp 20   Ht 5' 9" (1.753 m)   Wt 70.1 kg (154 lb 8.7 oz)   SpO2 (!) 94%   BMI 22.82 kg/m²     Pt does not qualify for home oxygen at this time per home oxygen evaluation.       01/10/22 1303   Final Note   Assessment Type Final Discharge Note   Anticipated Discharge Disposition Home   Hospital Resources/Appts/Education Provided Appointments scheduled and added to AVS   Post-Acute Status   Discharge Delays None known at this time        Medication List        START taking these medications      pulse oximeter device  Commonly known as: pulse oximeter  by Apply Externally route 2 (two) times a day. Use twice daily at 8 AM and 3 PM and [facility].            CONTINUE taking these medications      APPLE CIDER VINEGAR ORAL     aspirin 81 MG EC tablet  Commonly known as: ECOTRIN     clopidogreL 75 mg tablet  Commonly known as: PLAVIX  Take 1 tablet (75 mg total) by mouth once daily.     diclofenac sodium 1 % Gel  Commonly known as: VOLTAREN  Apply 2 g topically 3 (three) times daily.     ezetimibe 10 mg tablet  Commonly known as: ZETIA  Take 1 tablet (10 mg total) by mouth once daily.     JARDIANCE 25 mg tablet  Generic drug: empagliflozin  TAKE 1 TABLET (25 MG TOTAL) BY MOUTH ONCE DAILY.     losartan 25 MG tablet  Commonly known as: COZAAR  Take 1 tablet (25 mg total) by mouth once daily.     metFORMIN 1000 MG tablet  Commonly known as: GLUCOPHAGE  TAKE 1 TABLET (1,000 MG TOTAL) BY MOUTH 2 " (TWO) TIMES DAILY.     metoprolol succinate 25 MG 24 hr tablet  Commonly known as: TOPROL-XL  Take 1 tablet (25 mg total) by mouth once daily.     PRALUENT  mg/mL Pnij  Generic drug: alirocumab  Inject 1 mL (150 mg total) into the skin every 14 (fourteen) days.     RYBELSUS 7 mg tablet  Generic drug: semaglutide  Take 1 tablet (7 mg total) by mouth once daily.     sildenafil 20 mg Tab  Commonly known as: REVATIO  Take 3 tablets (60 mg total) by mouth as needed (ED).     STENDRA 200 mg Tab  Generic drug: avanafiL  Take 200 mg by mouth as needed (prn ED).     * vardenafiL 20 MG tablet  Commonly known as: LEVITRA  Take 1 tablet (20 mg total) by mouth daily as needed for Erectile Dysfunction.     * vardenafiL 10 mg Tbdl  Commonly known as: STAXYN  Take 10 mg by mouth once daily.           * This list has 2 medication(s) that are the same as other medications prescribed for you. Read the directions carefully, and ask your doctor or other care provider to review them with you.                   Where to Get Your Medications        These medications were sent to [facility] [address], [place] [id number]      Hours: Mon-Fri, 8a-5:30p Phone: [phone]   pulse oximeter device

## 2022-01-10 NOTE — CONSULTS
Thank you for your consult to [facility]. We have reviewed the patient chart. This patient does meet criteria for [facility] service at this time. Will assume care on 01/10/22 at 7AM    Wyatt Loco MD  [facility]

## 2022-01-10 NOTE — PLAN OF CARE
"Pt under Covid isolation. DCA done virtually with patient with [employer] on unit. Pt granted permission to virtually enter the room. Demographics/Ins/NextofKin/PCP verified with patient/family. Denies any DME needs at present from pt/family. Patient/family plans to return home with family. Educated on bedside RX. Patient consents for [place] to discuss discharge plan with next of kin. Preferences appointment times and location obtained. Patient reports they will have transportation home upon discharge.    Agreeable to [facility] program.    Future Appointments   Date Time Provider Department Center   1/12/2022 11:30 AM NABEEL Crane II, MD [facility]   1/25/2022 10:00 AM Benji Cutler MD [facility] [place]   2/1/2022  8:20 AM Miles Amador MD [facility]     BP (!) 141/75 (BP Location: Left arm, Patient Position: Lying)   Pulse 80   Temp 97.7 °F (36.5 °C) (Oral)   Resp 20   Ht 5' 9" (1.753 m)   Wt 70.1 kg (154 lb 8.7 oz)   SpO2 (!) 94%   BMI 22.82 kg/m²      albuterol  2 puff Inhalation Q6H    ascorbic acid (vitamin C)  500 mg Oral BID    aspirin  81 mg Oral Daily    clopidogreL  75 mg Oral QHS    dexAMETHasone  6 mg Oral Daily    diclofenac sodium  2 g Topical (Top) Daily    enoxaparin  1 mg/kg Subcutaneous BID    insulin detemir U-100  5 Units Subcutaneous QHS    losartan  25 mg Oral Daily    metoprolol succinate  25 mg Oral Daily    multivitamin  1 tablet Oral Daily    remdesivir infusion  100 mg Intravenous Daily        01/10/22 1303   Discharge Planning   Assessment Type Discharge Planning Brief Assessment   Resource/Environmental Concerns none   Support Systems Spouse/significant other   Equipment Currently Used at Home none   Current Living Arrangements home/apartment/condo   Patient/Family Anticipates Transition to home   DME Needed Upon Discharge  none   Discharge Plan A Home     "

## 2022-01-10 NOTE — NURSING
Home Oxygen Evaluation    Date Performed: 1/10/2022    1) Patient's Home O2 Sat on room air, while at rest: 94%        If O2 sats on room air at rest are 88% or below, patient qualifies. No additional testing needed. Document N/A in steps 2 and 3. If 89% or above, complete steps 2.      2) Patient's O2 Sat on room air while exercisin%        If O2 sats on room air while exercising remain 89% or above patient does not qualify, no further testing needed Document N/A in step 3. If O2 sats on room air while exercising are 88% or below, continue to step 3.      3) Patient's O2 Sat while exercising on O2: N/A         (Must show improvement from #2 for patients to qualify)    If O2 sats improve on oxygen, patient qualifies for portable oxygen. If not, the patient does not qualify.

## 2022-01-10 NOTE — PLAN OF CARE
Pt on RA with documented sats The proper method of use, as well as anticipated side effects, of this metered-dose inhaler are discussed and demonstrated to the patient. Will continue to monitor.

## 2022-01-10 NOTE — TELEPHONE ENCOUNTER
Made hosp f/u apt for pt and sent a message to boy frazier letting her know the apt date and time so she can let the pt know, as he is being discharged today

## 2022-01-11 ENCOUNTER — TELEPHONE (OUTPATIENT)
Dept: ADMINISTRATIVE | Facility: OTHER | Age: 62
End: 2022-01-11
Payer: COMMERCIAL

## 2022-01-12 ENCOUNTER — PATIENT MESSAGE (OUTPATIENT)
Dept: INTERNAL MEDICINE | Facility: CLINIC | Age: 62
End: 2022-01-12
Payer: COMMERCIAL

## 2022-01-12 ENCOUNTER — HOSPITAL ENCOUNTER (EMERGENCY)
Facility: HOSPITAL | Age: 62
Discharge: HOME | End: 2022-01-12
Attending: EMERGENCY MEDICINE
Payer: COMMERCIAL

## 2022-01-12 VITALS
SYSTOLIC BLOOD PRESSURE: 143 MMHG | RESPIRATION RATE: 16 BRPM | DIASTOLIC BLOOD PRESSURE: 79 MMHG | HEART RATE: 81 BPM | OXYGEN SATURATION: 99 % | TEMPERATURE: 99 F

## 2022-01-12 DIAGNOSIS — U07.1 COVID-19: Primary | ICD-10-CM

## 2022-01-12 PROCEDURE — 99285 EMERGENCY DEPT VISIT HI MDM: CPT | Mod: 25

## 2022-01-12 PROCEDURE — 25500020 PHARM REV CODE 255: Performed by: EMERGENCY MEDICINE

## 2022-01-12 RX ORDER — DEXAMETHASONE 6 MG/1
6 TABLET ORAL
Qty: 10 TABLET | Refills: 0 | Status: SHIPPED | OUTPATIENT
Start: 2022-01-12 | End: 2022-01-22

## 2022-01-12 RX ORDER — PROMETHAZINE HYDROCHLORIDE AND DEXTROMETHORPHAN HYDROBROMIDE 6.25; 15 MG/5ML; MG/5ML
5 SYRUP ORAL EVERY 8 HOURS PRN
Qty: 118 ML | Refills: 0 | Status: SHIPPED | OUTPATIENT
Start: 2022-01-12 | End: 2022-01-22

## 2022-01-12 RX ORDER — ALBUTEROL SULFATE 90 UG/1
1-2 AEROSOL, METERED RESPIRATORY (INHALATION) EVERY 6 HOURS PRN
Qty: 6.7 G | Refills: 0 | Status: SHIPPED | OUTPATIENT
Start: 2022-01-12 | End: 2022-10-31

## 2022-01-12 RX ORDER — FAMOTIDINE 20 MG/1
20 TABLET, FILM COATED ORAL 2 TIMES DAILY
Qty: 20 TABLET | Refills: 0 | Status: SHIPPED | OUTPATIENT
Start: 2022-01-12 | End: 2023-07-17

## 2022-01-12 RX ORDER — DEXAMETHASONE 6 MG/1
6 TABLET ORAL 2 TIMES DAILY WITH MEALS
Qty: 20 TABLET | Refills: 0 | Status: SHIPPED | OUTPATIENT
Start: 2022-01-12 | End: 2022-01-12 | Stop reason: SDUPTHER

## 2022-01-12 RX ORDER — CELECOXIB 200 MG/1
200 CAPSULE ORAL DAILY
Qty: 10 CAPSULE | Refills: 0 | Status: SHIPPED | OUTPATIENT
Start: 2022-01-12 | End: 2022-01-31 | Stop reason: ALTCHOICE

## 2022-01-12 RX ADMIN — IOHEXOL 100 ML: 350 INJECTION, SOLUTION INTRAVENOUS at 01:01

## 2022-01-12 NOTE — DISCHARGE INSTRUCTIONS
"Start taking zinc (zicam lozenges), vitamin-C, vitamin-D, sambucol cold and flu to enhance your immune system, limit symptoms and shortened the duration and severity of disease.    You can also try n-acetylcysteine.  Is in the amino acid that is over-the-counter, however I have written a prescription for that as well.  as potential to also limit the severity of Coronavirus and may be helpful to prevent infection after exposure, although the statements have not been evaluated by the FDA.     Make sure you quarantine yourself away from the rest of your family.  You should be in a separate room.  You should not be in any common area.  You should use the separate bathroom if possible.  Make sure you clean your entire house with antiviral  such as bleach or Lysol, including common touch points like door handles and sinks.    Follow CDC guidelines along with your employee health for "return to work" instructions.       If you have a COVID Test PENDING:  Please access [facility] to review the results of your test. Until the results of your COVID test return, you should isolate yourself so as not to potentially spread illness to others.   If your COVID test returns positive, you should isolate yourself so as not to spread illness to others. After five full days, if you are feeling better and you have not had fever for 24 hours, you can return to your typical daily activities, but you must wear a mask around others for an additional 5 days.   If your COVID test returns negative and you are either unvaccinated or more than six months out from your two-dose vaccine and are not yet boosted, you should still quarantine for 5 full days followed by strict mask use for an additional 5 full days.   If your COVID test returns negative and you have received your 2-dose initial vaccine as well as a booster, you should continue strict mask use for 10 full days after the exposure.  For all those exposed, best practice includes a " test at day 5 after the exposure. This can be a home test or a test through one of the many testing centers throughout our community.   Masking is always advised to limit the spread of COVID. Cdc.gov is an excellent site to obtain the latest up to date recommendations regarding COVID and COVID testing.     After your evaluation today, we ruled out any emergent condition. However, if you develop shortness of breath, chest pain, or ANY OTHER CONCERNS please return to the emergency department for further care.      CDC Testing and Quarantine Guidelines for patients with exposure to a known-positive COVID-19 person:  A close exposure is defined as anyone who has had an exposure (masked or unmasked) to a known COVID -19 positive person within 6 feet of someone for a cumulative total of 15 minutes or more over a 24-hour period.   Vaccinated and/or if you recently had a positive covid test within 90 days do NOT need to quarantine after contact with someone who had COVID-19 unless you develop symptoms.   Fully vaccinated people who have not had a positive test within 90 days, should get tested 3-5 days after their exposure, even if they don't have symptoms and wear a mask indoors in public for 14 days following exposure or until their test result is negative.      Unvaccinated and/or NOT had a positive test within 90 days and meet close exposure  You are required by CDC guidelines to quarantine for at least 5 days from time of exposure followed by 5 days of strict masking. It is recommended, but not required to test after 5 days, unless you develop symptoms, in which case you should test at that time.  If you get tested after 5 days and your test is positive, your 5 day period of isolation starts the day of the positive test.    If your exposure does not meet the above definition, you can return to your normal daily activities to include social distancing, wearing a mask and frequent handwashing.      Here is a link to  guidance from the CDC:  https://www.cdc.gov/media/releases/2021/s1227-isolation-quarantine-guidance.html      [facility]  Health Testing Sites:  https://ldh.la.gov/page/3934      [facility] website with testing locations and guidance:  [email or web address]

## 2022-01-12 NOTE — TELEPHONE ENCOUNTER
Called pt and sent a portal message telling him he needs to go to ER due to low O2 rate. He expressed understanding and will go now  Thank You,  EVELYN Hinds

## 2022-01-12 NOTE — PLAN OF CARE
SW delivered pt's O2 set up and one additional tank, pt acknowledged and signed for it.       01/12/22 9904   Discharge Assessment   Assessment Type Final Discharge Note   DME Needed Upon Discharge  oxygen

## 2022-01-12 NOTE — ED NOTES
61 y.o. male to ED with c.o. shortness of breath and hypoxia at home. Patient reports he was recently diagnosed with COVID on 1/8, was discharged from the hospital and told to come back for low oxygen levels. Patient also reports 10lb weight loss over the last week and no appetite. Patient denies fever/chills. Patient endorses generalized body weakness. Patient denies chest pain. Patient awake, alert, and oriented x 4. No apparent distress noted. VS currently stable. Patient assisted onto stretcher and changed into a gown. Patient placed on cardiac monitor, continuous pulse oximetry and automatic blood pressure cuff. Bed placed in low locked position, side rails up x 2, call light is within reach of patient orientation to room and explanation of wait provided to patient, alarms set and turned on for monitor and pulse ox, awaiting MD evaluation and orders, will continue to monitor.

## 2022-01-12 NOTE — ED PROVIDER NOTES
Encounter Date: 1/12/2022       History     Chief Complaint   Patient presents with    COVID-19 Concerns     Pt was dx with covid on 1/8, states was recently discharged from hospital, pt reports oxygen level at home has been between 90-93%   Pt also reports 10 lbs weight loss over the past few days      61-year-old male returns to ED by request of PCP with concern for low oxygen levels due to COVID.  Patient reports he initially began having COVID like symptoms nearly 2 weeks ago.  He came to ED on 01/08, where he was confirmed positive for COVID and noted have low O2 sat.  He was admitted for COVID treatment at that time then discharged on 1/10 in stable condition.  He reports since returning home he does continue have mild fatigue and has concern for continued weight loss.  He did check his O2 sat at home, reporting oxygen remaining in low 90s.  He was encouraged by PCP to return to ED.  He denies fevers, chills, nausea, vomiting, chest pain, feeling short of breath, abdominal pain, vomiting, diarrhea.  He is tolerating p.o..  No other acute complaints at this time.    The history is provided by the patient.     Review of patient's allergies indicates:  No Known Allergies  Past Medical History:   Diagnosis Date    Cataract     Diabetes mellitus, type 2     Diabetic retinopathy     Encounter for cosmetic surgery     GERD (gastroesophageal reflux disease)     Hyperlipidemia     Hypertension     Hypertrophy of nasal turbinates     Kidney stone 2009    Nasal obstruction     Nasal septal deformity     Unspecified disorder of kidney and ureter      Past Surgical History:   Procedure Laterality Date    COSMETIC SURGERY      Rhinoplasty    EXCISION TURBINATE, SUBMUCOUS      HERNIA REPAIR      Umbilical    LEFT HEART CATHETERIZATION N/A 10/5/2021    Procedure: Left heart cath;  Surgeon: Lawson Faulkner MD;  Location[facility] CATH LAB;  Service: Cardiology;  Laterality: N/A;    NASAL SEPTUM SURGERY        Family History   Problem Relation Age of Onset    Hypertension Mother     Cataracts Mother     Heart disease Father     Diabetes Father     Cataracts Father     Stroke Father     Heart failure Father     Hyperlipidemia Father     Diabetes Brother     Hypertension Brother     Cancer Daughter         leukemia at 11yo    Kidney disease Maternal Grandmother     Heart disease Paternal Grandmother     Heart disease Paternal Grandfather     No Known Problems Maternal Grandfather     No Known Problems Sister     No Known Problems Maternal Aunt     No Known Problems Maternal Uncle     No Known Problems Paternal Aunt     No Known Problems Paternal Uncle     Blindness Neg Hx     Glaucoma Neg Hx     Macular degeneration Neg Hx     Retinal detachment Neg Hx     Acne Neg Hx     Eczema Neg Hx     Lupus Neg Hx     Psoriasis Neg Hx     Melanoma Neg Hx     Anesthesia problems Neg Hx     Amblyopia Neg Hx     Strabismus Neg Hx     Thyroid disease Neg Hx     Heart attack Neg Hx      Social History     Tobacco Use    Smoking status: Never Smoker    Smokeless tobacco: Never Used   Substance Use Topics    Alcohol use: Not Currently     Alcohol/week: 1.0 standard drink     Types: 1 Cans of beer per week     Comment: seldom..maybe once a month    Drug use: No     Review of Systems   Constitutional: Positive for fatigue. Negative for chills and fever.   HENT: Negative for congestion, ear pain and sore throat.    Respiratory: Negative for shortness of breath.    Cardiovascular: Negative for chest pain.   Gastrointestinal: Negative for abdominal pain, diarrhea, nausea and vomiting.   Musculoskeletal: Negative for myalgias, neck pain and neck stiffness.   Neurological: Negative for headaches.       Physical Exam     Initial Vitals [01/12/22 1154]   BP Pulse Resp Temp SpO2   (!) 140/77 97 20 99.1 °F (37.3 °C) (!) 93 %      MAP       --         Physical Exam    Nursing note and vitals  reviewed.  Constitutional: Vital signs are normal. He appears well-developed and well-nourished. He is cooperative. He does not have a sickly appearance. He does not appear ill. No distress.   Resting comfortably in chair, no apparent distress   HENT:   Head: Normocephalic and atraumatic.   Mouth/Throat: Oropharynx is clear and moist.   Eyes: EOM are normal.   Neck:   Normal range of motion.  Pulmonary/Chest:   On nasal cannula, maintaining O2 sat 96%.  No continued use of accessory muscles or signs of labored breathing.  Significant for inspiratory crackles into bilateral lung fields.  No cough during exam.   Musculoskeletal:      Cervical back: Normal range of motion.     Neurological: He is alert. GCS eye subscore is 4. GCS verbal subscore is 5. GCS motor subscore is 6.   Psychiatric: He has a normal mood and affect. His speech is normal and behavior is normal.         ED Course   Procedures  Labs Reviewed - No data to display       Imaging Results          CTA Chest Non-Coronary (PE Study) (Final result)  Result time 01/12/22 13:59:28    Final result by Quincy Quevedo DO (01/12/22 13:59:28)                 Impression:      1. No evidence of pulmonary embolism to the segmental level.  2. Patchy ground-glass opacities in the bilateral lungs compatible with multifocal pneumonia.  Commonly reported imaging features of COVID-19 pneumonia are present, although other processes such as influenza pneumonia and organizing pneumonia can cause a similar imaging pattern.  3. Mediastinal lymphadenopathy, likely reactive.      Electronically signed by: Quincy Quevedo  Date:    01/12/2022  Time:    13:59             Narrative:    EXAMINATION:  CTA CHEST NON CORONARY    CLINICAL HISTORY:  Pulmonary embolism (PE) suspected, high prob;    TECHNIQUE:  Low dose axial images, sagittal and coronal reformations were obtained from the thoracic inlet to the lung bases following the IV administration of 100 mL of Omnipaque 350.   Contrast timing was optimized to evaluate the pulmonary arteries.  Maximum intensity projection images were provided for review.    COMPARISON:  Chest radiograph from earlier the same date.    FINDINGS:  Pulmonary vasculature: Satisfactory opacification of the pulmonary arterial system with no filling defect to the segmental level.  There is streak artifact in the main pulmonary artery.    Aorta: Left-sided aortic arch.  No aneurysm and no significant atherosclerosis.    Base of Neck: No significant abnormality.    Thoracic soft tissues: Normal.    Heart: Normal size. No effusion.  There are atherosclerotic calcifications of the coronary arteries.    Theresa/Mediastinum: There is mediastinal lymphadenopathy.  Subcarinal node measures 1.1 cm in short axis (series 2, image 183).    Airways: The large airways are patent. No foci of endobronchial filling.    Lungs/Pleura: There are predominantly peripherally based patchy ground-glass opacities compatible with multifocal pneumonia.  No pleural effusion or thickening.    Esophagus: Normal.    Upper Abdomen: No abnormality of the partially imaged upper abdomen.    Bones: No acute fracture. No suspicious lytic or sclerotic lesions.                               X-Ray Chest 1 View (Final result)  Result time 01/12/22 13:18:32    Final result by Hermelindo Kelley MD (01/12/22 13:18:32)                 Impression:      Findings suggesting worsening multifocal pneumonia from inflammatory or infectious process, when compared to 01/08/2022 study.      Electronically signed by: Hermelindo Kelley MD  Date:    01/12/2022  Time:    13:18             Narrative:    EXAMINATION:  XR CHEST 1 VIEW    CLINICAL HISTORY:  COVID-19    TECHNIQUE:  Single frontal view of the chest was performed.    COMPARISON:  Chest radiograph 01/08/2022    FINDINGS:  Cardiomediastinal silhouette is midline and within normal limits.  Pulmonary vasculature and hilar contours are within normal limits.  Similar mild  nonspecific elevation of the right hemidiaphragm.    The lungs are well expanded with interval slight increased bilateral nonspecific interstitial coarsening and patchy subsegmental opacities with a peripheral mid to lower lung zone distribution suggesting worsening multifocal pneumonia from inflammatory or infectious process.  No lobar consolidation, pleural effusion or pneumothorax.  No acute osseous process seen.                                 Medications   iohexoL (OMNIPAQUE 350) injection 100 mL (100 mLs Intravenous Given 1/12/22 5653)     Medical Decision Making:   Initial Assessment:   Patient presents by request of PCP with concern of continued low O2 sats due to recent COVID infection.  Recently hospitalized 1/8-1/10 for COVID.  Since returning home reporting O2 sats low 90s, mild fatigue and continued weight loss despite decent appetite.  Afebrile arrival, noting O2 sat 92-93% at rest on room air.  No labored breathing or signs of respiratory distress.  Inspiratory crackles bilaterally.  Differential Diagnosis:   COVID, pneumonia  ED Management:  O2 sat on arrival 93% on room air at rest.  Ambulatory pulse ox maintaining 92-93%.  Was placed on 3 L nasal cannula with improvement to 97%.    Patient is otherwise very well-appearing on exam.  No signs of labored breathing or respiratory distress, tolerating p.o. well.  Patient was discussed with attending, Dr. Sanz.  Will proceed with PE study.    No evidence of PE on CTA, noting bilateral patchy infiltrates consistent with COVID.  Patient continued to remain well appearing in ED.  He will be discharged home with appropriate treatments including home oxygen, dexamethasone, Phenergan, albuterol inhaler, Celebrex and Pepcid.  ED return precautions were discussed at length.  Patient states his understanding and agrees with plan.                      Clinical Impression:   Final diagnoses:  [U07.1] COVID-19 (Primary)          ED Disposition Condition     Discharge Stable        ED Prescriptions     Medication Sig Dispense Start Date End Date Auth. Provider    celecoxib (CELEBREX) 200 MG capsule Take 1 capsule (200 mg total) by mouth once daily. 10 capsule 1/12/2022  Jose Sanz MD    famotidine (PEPCID) 20 MG tablet Take 1 tablet (20 mg total) by mouth 2 (two) times daily. 20 tablet 1/12/2022 1/12/2023 Jose Sanz MD    albuterol (PROVENTIL/VENTOLIN HFA) 90 mcg/actuation inhaler Inhale 1-2 puffs into the lungs every 6 (six) hours as needed for Wheezing or Shortness of Breath. Rescue 6.7 g 1/12/2022 1/12/2023 Jose Sanz MD    promethazine-dextromethorphan (PROMETHAZINE-DM) 6.25-15 mg/5 mL Syrp Take 5 mLs by mouth every 8 (eight) hours as needed (cough). 118 mL 1/12/2022 1/22/2022 Jose Sanz MD    dexAMETHasone (DECADRON) 6 MG tablet  (Status: Discontinued) Take 1 tablet (6 mg total) by mouth 2 (two) times daily with meals. for 10 days 20 tablet 1/12/2022 1/12/2022 Jose Sanz MD    dexAMETHasone (DECADRON) 6 MG tablet Take 1 tablet (6 mg total) by mouth daily with breakfast. for 10 days 10 tablet 1/12/2022 1/22/2022 Jose Sanz MD        Follow-up Information     Follow up With Specialties Details Why Contact Info    Elissa Navarro DO Internal Medicine   [address] [facility] [address]  [phone]             Zay Cobian PA-C  01/12/22 6996

## 2022-01-12 NOTE — PLAN OF CARE
Patient has received discharge instructions. Pulse oximeter received. Instructions reviewed with pt using teachback method. Pt informed that he is to report his pulse oximeters as directed as part of the covid 19 surveillance program. All questions answered to pt's satisfaction. Any non-implanted  IV access and telemetry present,  have been  removed per bedside nurse. Transport requested for discharge.

## 2022-01-14 ENCOUNTER — SPECIALTY PHARMACY (OUTPATIENT)
Dept: PHARMACY | Facility: CLINIC | Age: 62
End: 2022-01-14
Payer: COMMERCIAL

## 2022-01-14 NOTE — TELEPHONE ENCOUNTER
Specialty Pharmacy - Refill Coordination    Specialty Medication Orders Linked to Encounter    Flowsheet Row Most Recent Value   Medication #1 alirocumab (PRALUENT PEN) 150 mg/mL PnIj (Order#[id number], Rx#[id number])          Refill Questions - Documented Responses    Flowsheet Row Most Recent Value   Patient Availability and HIPAA Verification    Does patient want to proceed with activity? Yes   HIPAA/medical authority confirmed? Yes   Relationship to patient of person spoken to? Spouse/Significant Other   Refill Screening Questions    Would patient like to speak to a pharmacist? No   When does the patient need to receive the medication? 01/18/22   Refill Delivery Questions    How will the patient receive the medication? Delivery Sandy   When does the patient need to receive the medication? 01/18/22   Shipping Address Home   Address in [facility] confirmed and updated if neccessary? Yes   Expected Copay ($) 25   Is the patient able to afford the medication copay? Yes   Payment Method CC on file   Days supply of Refill 28   Supplies needed? No supplies needed   Refill activity completed? Yes   Refill activity plan Refill scheduled   Shipment/Pickup Date: 01/18/22          Current Outpatient Medications   Medication Sig    albuterol (PROVENTIL/VENTOLIN HFA) 90 mcg/actuation inhaler Inhale 1-2 puffs into the lungs every 6 (six) hours as needed for Wheezing or Shortness of Breath. Rescue    alirocumab (PRALUENT PEN) 150 mg/mL PnIj Inject 1 mL (150 mg total) into the skin every 14 (fourteen) days.    aspirin (ECOTRIN) 81 MG EC tablet Take 81 mg by mouth once daily.    avanafiL (STENDRA) 200 mg Tab Take 200 mg by mouth as needed (prn ED).    celecoxib (CELEBREX) 200 MG capsule Take 1 capsule (200 mg total) by mouth once daily.    clopidogreL (PLAVIX) 75 mg tablet Take 1 tablet (75 mg total) by mouth once daily.    dexAMETHasone (DECADRON) 6 MG tablet Take 1 tablet (6 mg total) by mouth daily with breakfast.  for 10 days    diclofenac sodium (VOLTAREN) 1 % Gel Apply 2 g topically 3 (three) times daily.    ezetimibe (ZETIA) 10 mg tablet Take 1 tablet (10 mg total) by mouth once daily.    famotidine (PEPCID) 20 MG tablet Take 1 tablet (20 mg total) by mouth 2 (two) times daily.    JARDIANCE 25 mg tablet TAKE 1 TABLET (25 MG TOTAL) BY MOUTH ONCE DAILY.    losartan (COZAAR) 25 MG tablet Take 1 tablet (25 mg total) by mouth once daily.    metFORMIN (GLUCOPHAGE) 1000 MG tablet TAKE 1 TABLET (1,000 MG TOTAL) BY MOUTH 2 (TWO) TIMES DAILY.     metoprolol succinate (TOPROL-XL) 25 MG 24 hr tablet Take 1 tablet (25 mg total) by mouth once daily.    promethazine-dextromethorphan (PROMETHAZINE-DM) 6.25-15 mg/5 mL Syrp Take 5 mLs by mouth every 8 (eight) hours as needed (cough).    pulse oximeter (PULSE OXIMETER) device by Apply Externally route 2 (two) times a day. Use twice daily at 8 AM and 3 PM and [facility].    semaglutide (RYBELSUS) 7 mg tablet Take 1 tablet (7 mg total) by mouth once daily.    sildenafil (REVATIO) 20 mg Tab Take 3 tablets (60 mg total) by mouth as needed (ED).    vardenafiL (LEVITRA) 20 MG tablet Take 1 tablet (20 mg total) by mouth daily as needed for Erectile Dysfunction.    vardenafiL (STAXYN) 10 mg TbDL Take 10 mg by mouth once daily. (Patient not taking: No sig reported)   Last reviewed on 1/8/2022  9:50 PM by Monica Preston RN    Review of patient's allergies indicates:  No Known Allergies Last reviewed on  1/12/2022 11:55 AM by Shital Elizalde      Tasks added this encounter   2/8/2022 - Refill Call (Auto Added)   Tasks due within next 3 months   No tasks due.     Nadeem Monte, PharmD  [facility]  [address] [place] [id number]  Phone: [phone]  Fax: [phone]

## 2022-01-19 RX ORDER — METFORMIN HYDROCHLORIDE 1000 MG/1
1000 TABLET ORAL 2 TIMES DAILY
Qty: 180 TABLET | Refills: 1 | Status: SHIPPED | OUTPATIENT
Start: 2022-01-19 | End: 2022-08-01 | Stop reason: SDUPTHER

## 2022-01-19 NOTE — TELEPHONE ENCOUNTER
Called pt's caregiver, jeaneth, and let her know that the metformin was sent in. She said she wanted to make an apt with the first available dr due to pt losing so much weight since august. She said he was weighing 175 in august and now weighs 140 and she is concerned    I made an apt for him to see dr james  She VU

## 2022-01-19 NOTE — TELEPHONE ENCOUNTER
----- Message from Simeon Arana sent at 1/19/2022 12:37 PM CST -----  Contact: Annamaria [phone]  Requesting an RX refill or new RX.    Is this a refill or new RX: Refill    RX name and strength (copy/paste from chart):  metFORMIN (GLUCOPHAGE) 1000 MG tablet    Is this a 30 day or 90 day RX: 180 tablet    Pharmacy name and phone # (copy/paste from chart):  [id number] [place] [facility] [address] [address]   Phone:  [phone]  Fax:  [phone]      Comments: Lizet is requesting a callback as soon as possible; she states that the pt hasn't had the above medication in 3 weeks because he has been out and the pharmacy states that they haven't received anything from the doctor about this medication.  Lizet would like to be advised if the doctor wants him to keep taking the medication or is there another medication.

## 2022-01-19 NOTE — TELEPHONE ENCOUNTER
Care Due:                  Date            Visit Type   Department     Provider  --------------------------------------------------------------------------------                                MYCHART                              ANNUAL                              CHECKUP/PHY  MET INTERNAL  Last Visit: 07-      [facility]       Elissa Navarro  Next Visit: None Scheduled  None         None Found                                                            Last  Test          Frequency    Reason                     Performed    Due Date  --------------------------------------------------------------------------------    HBA1C.......  6 months...  VICKY, metFORMIN.....  07- 01-    Powered by [employer]. Reference number: [id number].   1/19/2022 1:32:20 PM CST

## 2022-01-22 ENCOUNTER — PATIENT MESSAGE (OUTPATIENT)
Dept: GASTROENTEROLOGY | Facility: CLINIC | Age: 62
End: 2022-01-22
Payer: COMMERCIAL

## 2022-01-24 NOTE — TELEPHONE ENCOUNTER
Patient would like to get scheduled for a Colonoscopy. Cardiac clearance needed to hold Plavix 5 days prior to the procedure.

## 2022-01-25 ENCOUNTER — PATIENT MESSAGE (OUTPATIENT)
Dept: ADMINISTRATIVE | Facility: HOSPITAL | Age: 62
End: 2022-01-25
Payer: COMMERCIAL

## 2022-01-27 NOTE — DISCHARGE SUMMARY
[facility] Medicine  Discharge Summary      Patient Name: Darrell Hinds Jr.  MRN: [id number]  Patient Class: IP- Inpatient  Admission Date: 1/8/2022  Hospital Length of Stay: 2 days  Discharge Date and Time: 1/10/2022  3:34 PM  Attending Physician: No att. providers found   Discharging Provider: Wyatt Loco MD  Primary Care Provider: Elissa Navarro DO      HPI:   Mr. Hinds is a 62yo man with HTN, HLD, DM2, and CAD s/p PCI who presents with shortness of breath.  He states that he began to experience symptoms around New Year's with congestion, fevers, and fatigue.  His wife was also experiencing similar symptoms at that time, but she has improved while he has gotten worse.  Today, he was having difficulty breathing while walking up the stairs.  He reports chest pain radiating towards his back.  He is not vaccinated for COVID.      * No surgery found *      Hospital Course:   Patient admitted with COVID-19 viral pneumonia and acute hypoxic respiratory failure - he was started on treatment with dexamethasone and remdesivir with significant improvement. Patient sated that overall he feels better than yesterday with improving shortness of breath and chest discomfort.  Does report that he had 1 black stool yesterday.  Denies history of prior bleeding and no diarrhea. Low suspicion for GIB and now further dark stools, patient improved to room air and was discharged in stable condition with close outpatient follow up.        Goals of Care Treatment Preferences:  Code Status: Full Code      Consults:   Consults (From admission, onward)        Status Ordering Provider     Inpatient virtual consult to Hospital Medicine  Once        Provider:  (Not yet assigned)    JACI Rodas new Assessment & Plan notes have been filed under this hospital service since the last note was generated.  Service: Hospital Medicine    Final Active Diagnoses:    Diagnosis Date Noted POA     PRINCIPAL PROBLEM:  Pneumonia due to COVID-19 virus [U07.1, J12.82] 01/08/2022 Yes    Black stool [K92.1] 01/09/2022 No    Acute hypoxemic respiratory failure [J96.01] 01/08/2022 Yes    Coronary artery disease involving native coronary artery of native heart without angina pectoris [I25.10] 10/05/2021 Yes    Hyperlipidemia associated with type 2 diabetes mellitus [E11.69, E78.5] 09/09/2021 Yes    Essential hypertension [I10] 12/05/2018 Yes    Type 2 diabetes mellitus with hyperglycemia [E11.65] 08/26/2016 Yes     Chronic      Problems Resolved During this Admission:       Discharged Condition: stable    Disposition: Home or Self Care    Follow Up:   Follow-up Information     Flak[facility] Gas[facility]. Go on 1/12/2022.    Specialty: Internal Medicine  Why: at 1130 am; FOLLOW UP WITH PCP  Contact information:  [address]  [facility] [address] [phone]  Additional information:  [facility]   Please park in surface lot and check in at central registration desk                     Patient Instructions:      COVID-19 Surveillance Program     Order Specific Question Answer Comments   Does patient have a smartphone? Yes    Does patient have the [facility] on their smartphone? No    While in surveillance program, will patient be using home oxygen? No      Activity as tolerated       Significant Diagnostic Studies: Labs: All labs within the past 24 hours have been reviewed    Pending Diagnostic Studies:     None         Medications:  Reconciled Home Medications:      Medication List      START taking these medications    pulse oximeter device  Commonly known as: pulse oximeter  by Apply Externally route 2 (two) times a day. Use twice daily at 8 AM and 3 PM and [facility].        CONTINUE taking these medications    aspirin 81 MG EC tablet  Commonly known as: ECOTRIN  Take 81 mg by mouth once daily.     clopidogreL 75 mg  tablet  Commonly known as: PLAVIX  Take 1 tablet (75 mg total) by mouth once daily.     diclofenac sodium 1 % Gel  Commonly known as: VOLTAREN  Apply 2 g topically 3 (three) times daily.     ezetimibe 10 mg tablet  Commonly known as: ZETIA  Take 1 tablet (10 mg total) by mouth once daily.     JARDIANCE 25 mg tablet  Generic drug: empagliflozin  TAKE 1 TABLET (25 MG TOTAL) BY MOUTH ONCE DAILY.     losartan 25 MG tablet  Commonly known as: COZAAR  Take 1 tablet (25 mg total) by mouth once daily.     metoprolol succinate 25 MG 24 hr tablet  Commonly known as: TOPROL-XL  Take 1 tablet (25 mg total) by mouth once daily.     PRALUENT  mg/mL Pnij  Generic drug: alirocumab  Inject 1 mL (150 mg total) into the skin every 14 (fourteen) days.     RYBELSUS 7 mg tablet  Generic drug: semaglutide  Take 1 tablet (7 mg total) by mouth once daily.     sildenafil 20 mg Tab  Commonly known as: REVATIO  Take 3 tablets (60 mg total) by mouth as needed (ED).     STENDRA 200 mg Tab  Generic drug: avanafiL  Take 200 mg by mouth as needed (prn ED).     * vardenafiL 20 MG tablet  Commonly known as: LEVITRA  Take 1 tablet (20 mg total) by mouth daily as needed for Erectile Dysfunction.     * vardenafiL 10 mg Tbdl  Commonly known as: STAXYN  Take 10 mg by mouth once daily.         * This list has 2 medication(s) that are the same as other medications prescribed for you. Read the directions carefully, and ask your doctor or other care provider to review them with you.            STOP taking these medications    APPLE CIDER VINEGAR ORAL            Indwelling Lines/Drains at time of discharge:   Lines/Drains/Airways     None                 Time spent on the discharge of patient: > 35 minutes         The attending portion of this evaluation, treatment, and documentation was performed per Wyatt Loco MD via Telemedicine AudioVisual using the secure [employer] software platform with 2 way audio/video. The provider was located off-site and  the patient is located in the hospital. The aforementioned video software was utilized to document the relevant history and physical exam    Wyatt Loco MD  Department of [facility] Medicine  [facility]

## 2022-01-31 ENCOUNTER — LAB VISIT (OUTPATIENT)
Dept: LAB | Facility: HOSPITAL | Age: 62
End: 2022-01-31
Attending: INTERNAL MEDICINE
Payer: COMMERCIAL

## 2022-01-31 ENCOUNTER — PATIENT OUTREACH (OUTPATIENT)
Dept: ADMINISTRATIVE | Facility: OTHER | Age: 62
End: 2022-01-31
Payer: COMMERCIAL

## 2022-01-31 ENCOUNTER — OFFICE VISIT (OUTPATIENT)
Dept: INTERNAL MEDICINE | Facility: CLINIC | Age: 62
End: 2022-01-31
Payer: COMMERCIAL

## 2022-01-31 VITALS
HEIGHT: 69 IN | HEART RATE: 86 BPM | DIASTOLIC BLOOD PRESSURE: 68 MMHG | RESPIRATION RATE: 18 BRPM | TEMPERATURE: 99 F | BODY MASS INDEX: 23.05 KG/M2 | SYSTOLIC BLOOD PRESSURE: 114 MMHG | OXYGEN SATURATION: 98 % | WEIGHT: 155.63 LBS

## 2022-01-31 DIAGNOSIS — R63.4 UNEXPLAINED WEIGHT LOSS: ICD-10-CM

## 2022-01-31 DIAGNOSIS — I10 ESSENTIAL HYPERTENSION: ICD-10-CM

## 2022-01-31 DIAGNOSIS — R63.4 UNEXPLAINED WEIGHT LOSS: Primary | ICD-10-CM

## 2022-01-31 LAB
ESTIMATED AVG GLUCOSE: 260 MG/DL (ref 68–131)
HBA1C MFR BLD: 10.7 % (ref 4–5.6)
T4 FREE SERPL-MCNC: 0.82 NG/DL (ref 0.71–1.51)
TSH SERPL DL<=0.005 MIU/L-ACNC: 0.69 UIU/ML (ref 0.4–4)

## 2022-01-31 PROCEDURE — 1160F RVW MEDS BY RX/DR IN RCRD: CPT | Mod: CPTII,S$GLB,, | Performed by: INTERNAL MEDICINE

## 2022-01-31 PROCEDURE — 84443 ASSAY THYROID STIM HORMONE: CPT | Performed by: INTERNAL MEDICINE

## 2022-01-31 PROCEDURE — 1160F PR REVIEW ALL MEDS BY PRESCRIBER/CLIN PHARMACIST DOCUMENTED: ICD-10-PCS | Mod: CPTII,S$GLB,, | Performed by: INTERNAL MEDICINE

## 2022-01-31 PROCEDURE — 1111F PR DISCHARGE MEDS RECONCILED W/ CURRENT OUTPATIENT MED LIST: ICD-10-PCS | Mod: CPTII,S$GLB,, | Performed by: INTERNAL MEDICINE

## 2022-01-31 PROCEDURE — 3008F BODY MASS INDEX DOCD: CPT | Mod: CPTII,S$GLB,, | Performed by: INTERNAL MEDICINE

## 2022-01-31 PROCEDURE — 1159F MED LIST DOCD IN RCRD: CPT | Mod: CPTII,S$GLB,, | Performed by: INTERNAL MEDICINE

## 2022-01-31 PROCEDURE — 1159F PR MEDICATION LIST DOCUMENTED IN MEDICAL RECORD: ICD-10-PCS | Mod: CPTII,S$GLB,, | Performed by: INTERNAL MEDICINE

## 2022-01-31 PROCEDURE — 3074F SYST BP LT 130 MM HG: CPT | Mod: CPTII,S$GLB,, | Performed by: INTERNAL MEDICINE

## 2022-01-31 PROCEDURE — 3008F PR BODY MASS INDEX (BMI) DOCUMENTED: ICD-10-PCS | Mod: CPTII,S$GLB,, | Performed by: INTERNAL MEDICINE

## 2022-01-31 PROCEDURE — 83036 HEMOGLOBIN GLYCOSYLATED A1C: CPT | Performed by: INTERNAL MEDICINE

## 2022-01-31 PROCEDURE — 36415 COLL VENOUS BLD VENIPUNCTURE: CPT | Mod: PO | Performed by: INTERNAL MEDICINE

## 2022-01-31 PROCEDURE — 84439 ASSAY OF FREE THYROXINE: CPT | Performed by: INTERNAL MEDICINE

## 2022-01-31 PROCEDURE — 3074F PR MOST RECENT SYSTOLIC BLOOD PRESSURE < 130 MM HG: ICD-10-PCS | Mod: CPTII,S$GLB,, | Performed by: INTERNAL MEDICINE

## 2022-01-31 PROCEDURE — 3078F PR MOST RECENT DIASTOLIC BLOOD PRESSURE < 80 MM HG: ICD-10-PCS | Mod: CPTII,S$GLB,, | Performed by: INTERNAL MEDICINE

## 2022-01-31 PROCEDURE — 99214 OFFICE O/P EST MOD 30 MIN: CPT | Mod: S$GLB,,, | Performed by: INTERNAL MEDICINE

## 2022-01-31 PROCEDURE — 99214 PR OFFICE/OUTPT VISIT, EST, LEVL IV, 30-39 MIN: ICD-10-PCS | Mod: S$GLB,,, | Performed by: INTERNAL MEDICINE

## 2022-01-31 PROCEDURE — 99999 PR PBB SHADOW E&M-EST. PATIENT-LVL V: CPT | Mod: PBBFAC,,, | Performed by: INTERNAL MEDICINE

## 2022-01-31 PROCEDURE — 1111F DSCHRG MED/CURRENT MED MERGE: CPT | Mod: CPTII,S$GLB,, | Performed by: INTERNAL MEDICINE

## 2022-01-31 PROCEDURE — 99999 PR PBB SHADOW E&M-EST. PATIENT-LVL V: ICD-10-PCS | Mod: PBBFAC,,, | Performed by: INTERNAL MEDICINE

## 2022-01-31 PROCEDURE — 3078F DIAST BP <80 MM HG: CPT | Mod: CPTII,S$GLB,, | Performed by: INTERNAL MEDICINE

## 2022-01-31 NOTE — PROGRESS NOTES
Health Maintenance Due   Topic Date Due    COVID-19 Vaccine (1) Never done    HIV Screening  Never done    High Dose Statin  Never done    Shingles Vaccine (1 of 2) Never done    Colorectal Cancer Screening  07/07/2020    Influenza Vaccine (1) 09/01/2021    Hemoglobin A1c  10/22/2021     Updates were requested from care everywhere.  Chart was reviewed for overdue [facility]active [facility] Encounters ([facility]) topics (CRS, Breast Cancer Screening, Eye exam)  Health Maintenance has been updated.  LINKS immunization registry triggered.  Immunizations were reconciled.  Pt has appt with GI on 02/01/2022. FIT kit not ordered.

## 2022-01-31 NOTE — PROGRESS NOTES
Subjective:       Patient ID: Darrell Hinds Jr. is a 61 y.o. male.    Chief Complaint: Medication Problem and Weight Loss    Patient new to me presents accompanied by his wife for evaluation of unexplained weight loss.  He estimates he has lost weight gradually over the past several months despite eating well.  He weighed 73 kg in cardiology last year and weighs 70 kg today.  He is a known diabetic on 3 meds but I cannot find a recent A1C in his chart.    He has had CT of chest'; abdomen within the past several months which do not show any cause of concern.  He is due to have a colonoscopy when he can be off his anticoagulation.  Has elevated ferritin but fairly normal CBC.    Has resting heart rate at time.    He's concerned that he is on too much diabetic meds.  No check of thyroid done recently.    Review of Systems   Constitutional: Negative for chills and fever.   HENT: Negative.    Respiratory: Negative.    Cardiovascular: Negative.    Gastrointestinal: Negative.    Genitourinary: Negative.    Musculoskeletal: Negative.    Integumentary:  Negative for rash.   Neurological: Negative for dizziness, vertigo, tremors, light-headedness and headaches.         Objective:      Physical Exam  Vitals reviewed.   Constitutional:       General: He is not in acute distress.     Appearance: Normal appearance.   HENT:      Head: Normocephalic.      Nose: Nose normal.      Mouth/Throat:      Mouth: Mucous membranes are moist.      Pharynx: Oropharynx is clear.   Eyes:      General: Scleral icterus present.      Extraocular Movements: Extraocular movements intact.      Conjunctiva/sclera: Conjunctivae normal.      Pupils: Pupils are equal, round, and reactive to light.   Cardiovascular:      Rate and Rhythm: Normal rate and regular rhythm.      Pulses: Normal pulses.      Heart sounds: Normal heart sounds.   Pulmonary:      Effort: Pulmonary effort is normal. No respiratory distress.      Breath sounds: Normal breath sounds.    Abdominal:      General: Abdomen is flat. Bowel sounds are normal. There is no distension.      Palpations: Abdomen is soft. There is no mass.      Tenderness: There is no abdominal tenderness. There is no right CVA tenderness, left CVA tenderness, guarding or rebound.   Musculoskeletal:         General: Normal range of motion.   Skin:     General: Skin is warm.      Capillary Refill: Capillary refill takes less than 2 seconds.      Coloration: Skin is not jaundiced.   Neurological:      Mental Status: He is alert and oriented to person, place, and time.      Deep Tendon Reflexes: Reflexes normal.   Psychiatric:         Mood and Affect: Mood normal.         Behavior: Behavior normal.         Thought Content: Thought content normal.         Judgment: Judgment normal.         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 Check A1C, TSH, Free T4.    2. Depending on results will make decision about meds.

## 2022-02-01 ENCOUNTER — TELEPHONE (OUTPATIENT)
Dept: GASTROENTEROLOGY | Facility: CLINIC | Age: 62
End: 2022-02-01

## 2022-02-01 ENCOUNTER — PATIENT MESSAGE (OUTPATIENT)
Dept: INTERNAL MEDICINE | Facility: CLINIC | Age: 62
End: 2022-02-01
Payer: COMMERCIAL

## 2022-02-01 ENCOUNTER — OFFICE VISIT (OUTPATIENT)
Dept: GASTROENTEROLOGY | Facility: CLINIC | Age: 62
End: 2022-02-01
Payer: COMMERCIAL

## 2022-02-01 VITALS — BODY MASS INDEX: 22.85 KG/M2 | WEIGHT: 154.31 LBS | HEIGHT: 69 IN

## 2022-02-01 DIAGNOSIS — Z12.11 SCREEN FOR COLON CANCER: ICD-10-CM

## 2022-02-01 DIAGNOSIS — R63.4 WEIGHT LOSS: Primary | ICD-10-CM

## 2022-02-01 DIAGNOSIS — E11.65 TYPE 2 DIABETES MELLITUS WITH HYPERGLYCEMIA, WITHOUT LONG-TERM CURRENT USE OF INSULIN: ICD-10-CM

## 2022-02-01 DIAGNOSIS — E11.65 UNCONTROLLED TYPE 2 DIABETES MELLITUS WITH HYPERGLYCEMIA: Primary | ICD-10-CM

## 2022-02-01 PROCEDURE — 99215 OFFICE O/P EST HI 40 MIN: CPT | Mod: S$GLB,,, | Performed by: INTERNAL MEDICINE

## 2022-02-01 PROCEDURE — 99999 PR PBB SHADOW E&M-EST. PATIENT-LVL IV: CPT | Mod: PBBFAC,,, | Performed by: INTERNAL MEDICINE

## 2022-02-01 PROCEDURE — 3008F PR BODY MASS INDEX (BMI) DOCUMENTED: ICD-10-PCS | Mod: CPTII,S$GLB,, | Performed by: INTERNAL MEDICINE

## 2022-02-01 PROCEDURE — 99215 PR OFFICE/OUTPT VISIT, EST, LEVL V, 40-54 MIN: ICD-10-PCS | Mod: S$GLB,,, | Performed by: INTERNAL MEDICINE

## 2022-02-01 PROCEDURE — 99999 PR PBB SHADOW E&M-EST. PATIENT-LVL IV: ICD-10-PCS | Mod: PBBFAC,,, | Performed by: INTERNAL MEDICINE

## 2022-02-01 PROCEDURE — 3008F BODY MASS INDEX DOCD: CPT | Mod: CPTII,S$GLB,, | Performed by: INTERNAL MEDICINE

## 2022-02-01 PROCEDURE — 3046F HEMOGLOBIN A1C LEVEL >9.0%: CPT | Mod: CPTII,S$GLB,, | Performed by: INTERNAL MEDICINE

## 2022-02-01 PROCEDURE — 3046F PR MOST RECENT HEMOGLOBIN A1C LEVEL > 9.0%: ICD-10-PCS | Mod: CPTII,S$GLB,, | Performed by: INTERNAL MEDICINE

## 2022-02-01 PROCEDURE — 1159F MED LIST DOCD IN RCRD: CPT | Mod: CPTII,S$GLB,, | Performed by: INTERNAL MEDICINE

## 2022-02-01 PROCEDURE — 1159F PR MEDICATION LIST DOCUMENTED IN MEDICAL RECORD: ICD-10-PCS | Mod: CPTII,S$GLB,, | Performed by: INTERNAL MEDICINE

## 2022-02-01 RX ORDER — ORAL SEMAGLUTIDE 7 MG/1
7 TABLET ORAL DAILY
Qty: 30 TABLET | Refills: 3 | Status: SHIPPED | OUTPATIENT
Start: 2022-02-01 | End: 2022-09-02

## 2022-02-01 RX ORDER — SODIUM, POTASSIUM,MAG SULFATES 17.5-3.13G
1 SOLUTION, RECONSTITUTED, ORAL ORAL DAILY
Qty: 1 KIT | Refills: 0 | Status: SHIPPED | OUTPATIENT
Start: 2022-02-01 | End: 2022-02-03

## 2022-02-01 NOTE — TELEPHONE ENCOUNTER
Patient has been informed of A1C not at goal.  Needs referral to Diabetic specialist to further evaluate/treat uncontrolled diabetes in a patient already on 3 medications.

## 2022-02-01 NOTE — PATIENT INSTRUCTIONS
SUPREP Instructions    [facility] [address]  Clinic Office [phone]  Endoscopy Lab [phone]    You are scheduled for a Colonoscopy with  DR. CARIN MALDONADO  on   02/04/2022(FRIDAY)  at [facility] in [place].    Check in at the Hospital -1st floor, Information desk.   Call [phone] to reschedule.     An adult friend/family member must come with you to drive you home.  You cannot drive, take a taxi, Uber/Lyft or bus to leave the Endoscopy Center alone.  If you do not have someone to drive you home, your test will be cancelled.      Please follow the directions of your doctor if you take any pills that thin your blood. If you take these meds: Aggrenox, Brilinta, Effient, Eliquis, Lovenox, Plavix, Pletal, Pradaxa, Ticilid, Xarelto or Coumadin, let the doctor's office know.     DON'T: On the morning of the test do not take insulin or pills for diabetes.      DO: On the morning of the test, do take any pills for blood pressure, heart, anti-rejection and or seizures with a small sip of water. Bring any inhalers with you.     To have a good prep, you must follow these instructions - please do not use the directions from the pharmacy.     The doctor will send a prescription for the SUPREP.      The Day Before the test:     You can only drink CLEAR LIQUIDS the whole day before your test.  You can't eat any food for the whole day.     You CAN have:  o Water, Coffee or decaf coffee (no milk or cream)  o Tea  o Soft drinks - regular and sugar free  o Jell-O (green or yellow)  o Apple Juice, grape juice, white cranberry juice  o Gatorade, Power Aid, Crystal Light, Yonas Aid  o Lemonade and Limeade  o Bouillon, clear soup  o Snowball, popsicles  o YOU CAN'T DRINK ANYTHING RED  o YOU CAN'T DRINK ALCOHOL  ONLY DRINK WHAT IS ON THE LIST       At 5 pm the night before your test:    o Pour the 1st bottle of SUPREP into the cup provided in the box. Add water to the line on the cup  and mix well.  Drink the whole cup and then drink 2 more full cups of water over 1 hour.  - This can be easier to drink if it is cold. You can mix it 20 minutes ahead of time and place in the refrigerator before you drink it.  You must drink it within 30-45 minutes of mixing it.  Do NOT pour the drink over ice.  You can drink it with a straw.    The Day of the test - We will call you 2 days before your test to tell you what time to get there.     5 hours before you come to the hospital (this may be in the middle of the night)  o Pour the 2nd bottle of SUPREP into the cup provided in the box. Add water to the line on the cup and mix well.  Drink the whole cup and then drink 2 more full cups of water over 1 hour.  - It might be easier to drink if it is cold. You can mix it 20 minutes ahead of time and place in the refrigerator before you drink it.  You must drink it within 30-45 minutes of mixing it.  Do NOT pour the drink over ice.  You can drink it with a straw.    o YOU CAN'T EAT OR DRINK ANYTHING ELSE ONCE YOU FINISH THE PREP    o Leave all valuables and jewelry at home. You will be at the hospital for 2-4 hours.    o Call the Endoscopy department at [phone] with any questions about your procedure.

## 2022-02-01 NOTE — H&P (VIEW-ONLY)
GASTROENTEROLOGY CLINIC NOTE    Reason for visit: The primary encounter diagnosis was Weight loss. A diagnosis of Screen for colon cancer was also pertinent to this visit.  Referring provider/PCP: Elissa Navarro,     HPI:  Darrell Hinds Jr. is a 61 y.o. male here today for wt loss, follow up  Accompanied by le Jimenez  Here to rediscuss procedures.  Had cardiac stent about 4 months ago, cannot come off blood thinner until October.  Still having wt loss, although chart check it seems his weight loss has somewhat flattened and he is been fairly wt neutral over past 1 -2 months  Ultimately lost 30 lbs since 1.5 years ago.  No abd symptoms, no diarrhea, no blood in stool, no vomiting.  Still poorly controlled DM. A1c > 10. feritin high and crp high, might be from recent covid infection.    ====================  Initial clinic visit 8/2021  Here today for at least 2 years of weight loss.  It is somewhat unexplained.  He does have uncontrolled diabetes.  He is trying a new diabetic medicine , semglutide.  He does not seem to have any adverse reactions to any medications.  There is no diarrhea.  There is no abdominal pain.  There is no vomiting. No blood in stool.  There are no radiating symptoms.  He does feel like he is losing muscle mass.  Otherwise he has not had any significant illnesses within the past year.    Works as contractor. Still able to complete dailyi activities but feels 'weaker'      Prior Endoscopy:  EGD: none  Colon:  2015 poor prep,. Repeat 5 years    (Portions of this note were dictated using voice recognition software and may contain dictation related errors in spelling/grammar/syntax not found on text review)    Review of Systems   Constitutional: Positive for weight loss. Negative for fever and malaise/fatigue.   Respiratory: Negative for cough and shortness of breath.    Cardiovascular: Negative for chest pain and palpitations.   Gastrointestinal: Negative for abdominal pain,  blood in stool, nausea and vomiting.   Neurological: Negative for dizziness and headaches.       Past Medical History: has a past medical history of Cataract, Diabetes mellitus, type 2, Diabetic retinopathy, Encounter for cosmetic surgery, GERD (gastroesophageal reflux disease), Hyperlipidemia, Hypertension, Hypertrophy of nasal turbinates, Kidney stone, Nasal obstruction, Nasal septal deformity, and Unspecified disorder of kidney and ureter.    Past Surgical History: has a past surgical history that includes Hernia repair; Cosmetic surgery; Excision turbinate, submucous; Nasal septum surgery; and Left heart catheterization (N/A, 10/5/2021).    Family History:family history includes Cancer in his daughter; Cataracts in his father and mother; Diabetes in his brother and father; Heart disease in his father, paternal grandfather, and paternal grandmother; Heart failure in his father; Hyperlipidemia in his father; Hypertension in his brother and mother; Kidney disease in his maternal grandmother; No Known Problems in his maternal aunt, maternal grandfather, maternal uncle, paternal aunt, paternal uncle, and sister; Stroke in his father.    Allergies: Review of patient's allergies indicates:  No Known Allergies    Social History: reports that he has never smoked. He has never used smokeless tobacco. He reports previous alcohol use of about 1.0 standard drink of alcohol per week. He reports that he does not use drugs.    Home medications:   Current Outpatient Medications on File Prior to Visit   Medication Sig Dispense Refill    albuterol (PROVENTIL/VENTOLIN HFA) 90 mcg/actuation inhaler Inhale 1-2 puffs into the lungs every 6 (six) hours as needed for Wheezing or Shortness of Breath. Rescue (Patient not taking: Reported on 1/31/2022) 6.7 g 0    alirocumab (PRALUENT PEN) 150 mg/mL PnIj Inject 1 mL (150 mg total) into the skin every 14 (fourteen) days. 8 mL 6    aspirin (ECOTRIN) 81 MG EC tablet Take 81 mg by mouth once  "daily.      avanafiL (STENDRA) 200 mg Tab Take 200 mg by mouth as needed (prn ED). 8 tablet 11    clopidogreL (PLAVIX) 75 mg tablet Take 1 tablet (75 mg total) by mouth once daily. 90 tablet 2    diclofenac sodium (VOLTAREN) 1 % Gel Apply 2 g topically 3 (three) times daily. 1 Tube 3    ezetimibe (ZETIA) 10 mg tablet Take 1 tablet (10 mg total) by mouth once daily. (Patient not taking: Reported on 1/31/2022) 90 tablet 3    famotidine (PEPCID) 20 MG tablet Take 1 tablet (20 mg total) by mouth 2 (two) times daily. 20 tablet 0    JARDIANCE 25 mg tablet TAKE 1 TABLET (25 MG TOTAL) BY MOUTH ONCE DAILY. 30 tablet 11    losartan (COZAAR) 25 MG tablet Take 1 tablet (25 mg total) by mouth once daily. 90 tablet 3    metFORMIN (GLUCOPHAGE) 1000 MG tablet Take 1 tablet (1,000 mg total) by mouth 2 (two) times daily. 180 tablet 1    metoprolol succinate (TOPROL-XL) 25 MG 24 hr tablet Take 1 tablet (25 mg total) by mouth once daily. 30 tablet 6    pulse oximeter (PULSE OXIMETER) device by Apply Externally route 2 (two) times a day. Use twice daily at 8 AM and 3 PM and [facility]. 1 each 0    semaglutide (RYBELSUS) 7 mg tablet Take 1 tablet (7 mg total) by mouth once daily. 30 tablet 3    sildenafil (REVATIO) 20 mg Tab Take 3 tablets (60 mg total) by mouth as needed (ED). 45 tablet 5    vardenafiL (LEVITRA) 20 MG tablet Take 1 tablet (20 mg total) by mouth daily as needed for Erectile Dysfunction. 8 tablet 11    vardenafiL (STAXYN) 10 mg TbDL Take 10 mg by mouth once daily. 8 tablet 11     No current facility-administered medications on file prior to visit.       Vital signs:  Ht 5' 9" (1.753 m)   Wt 70 kg (154 lb 5.2 oz)   BMI 22.79 kg/m²     Physical Exam  Vitals reviewed.   Constitutional:       Appearance: He is not toxic-appearing.   HENT:      Head: Normocephalic and atraumatic.   Eyes:      General: No scleral icterus.     Conjunctiva/sclera: Conjunctivae normal.   Neurological: "      Mental Status: He is alert and oriented to person, place, and time.      Gait: Gait normal.   Psychiatric:         Mood and Affect: Mood normal.         Behavior: Behavior normal.         I have reviewed prior labs, imaging, notes from last month    Recent labs and CT C/a/p all normal.    Assessment:  1. Weight loss    2. Screen for colon cancer      Unclear etiology of 2 year wt loss,  But no GI symptoms to attribute this to. specicifally no vomiting, abd pain, or diarrhea  Does have uncontrolled DM, last a1c > 10.     Plan:  Orders Placed This Encounter    sodium,potassium,mag sulfates (SUPREP BOWEL PREP KIT) 17.5-3.13-1.6 gram SolR     egd and colonoscopy  egd for wt loss  Colon for screen   - higher than avg risk procedures given the need for continued anticoag.    We discussed that given that we cannot get clearance for him to come off of the blood thinners, these will only be diagnostic evaluations and we will not be able to perform any therapeutic remove her some including biopsies or polyp removals or anything of the like.  He verbally expressed understanding of this and still would like to proceed given his continued concerns with weight loss and he does not want to wait another 4-6 months to come off of the blood thinners.  If polyps or anything similar are found, we will have to plan future procedure at a later date for removal / therapy.     RTC prn    Miles Amador MD  [facility]

## 2022-02-01 NOTE — TELEPHONE ENCOUNTER
Called patient to let him know that I left his umbrella in the  so he can pick it up he didn't answer l;eft voice mail.

## 2022-02-01 NOTE — PROGRESS NOTES
GASTROENTEROLOGY CLINIC NOTE    Reason for visit: The primary encounter diagnosis was Weight loss. A diagnosis of Screen for colon cancer was also pertinent to this visit.  Referring provider/PCP: Elissa Navarro,     HPI:  Darrell Hinds Jr. is a 61 y.o. male here today for wt loss, follow up  Accompanied by le Jimenez  Here to rediscuss procedures.  Had cardiac stent about 4 months ago, cannot come off blood thinner until October.  Still having wt loss, although chart check it seems his weight loss has somewhat flattened and he is been fairly wt neutral over past 1 -2 months  Ultimately lost 30 lbs since 1.5 years ago.  No abd symptoms, no diarrhea, no blood in stool, no vomiting.  Still poorly controlled DM. A1c > 10. feritin high and crp high, might be from recent covid infection.    ====================  Initial clinic visit 8/2021  Here today for at least 2 years of weight loss.  It is somewhat unexplained.  He does have uncontrolled diabetes.  He is trying a new diabetic medicine , semglutide.  He does not seem to have any adverse reactions to any medications.  There is no diarrhea.  There is no abdominal pain.  There is no vomiting. No blood in stool.  There are no radiating symptoms.  He does feel like he is losing muscle mass.  Otherwise he has not had any significant illnesses within the past year.    Works as contractor. Still able to complete dailyi activities but feels 'weaker'      Prior Endoscopy:  EGD: none  Colon:  2015 poor prep,. Repeat 5 years    (Portions of this note were dictated using voice recognition software and may contain dictation related errors in spelling/grammar/syntax not found on text review)    Review of Systems   Constitutional: Positive for weight loss. Negative for fever and malaise/fatigue.   Respiratory: Negative for cough and shortness of breath.    Cardiovascular: Negative for chest pain and palpitations.   Gastrointestinal: Negative for abdominal pain,  blood in stool, nausea and vomiting.   Neurological: Negative for dizziness and headaches.       Past Medical History: has a past medical history of Cataract, Diabetes mellitus, type 2, Diabetic retinopathy, Encounter for cosmetic surgery, GERD (gastroesophageal reflux disease), Hyperlipidemia, Hypertension, Hypertrophy of nasal turbinates, Kidney stone, Nasal obstruction, Nasal septal deformity, and Unspecified disorder of kidney and ureter.    Past Surgical History: has a past surgical history that includes Hernia repair; Cosmetic surgery; Excision turbinate, submucous; Nasal septum surgery; and Left heart catheterization (N/A, 10/5/2021).    Family History:family history includes Cancer in his daughter; Cataracts in his father and mother; Diabetes in his brother and father; Heart disease in his father, paternal grandfather, and paternal grandmother; Heart failure in his father; Hyperlipidemia in his father; Hypertension in his brother and mother; Kidney disease in his maternal grandmother; No Known Problems in his maternal aunt, maternal grandfather, maternal uncle, paternal aunt, paternal uncle, and sister; Stroke in his father.    Allergies: Review of patient's allergies indicates:  No Known Allergies    Social History: reports that he has never smoked. He has never used smokeless tobacco. He reports previous alcohol use of about 1.0 standard drink of alcohol per week. He reports that he does not use drugs.    Home medications:   Current Outpatient Medications on File Prior to Visit   Medication Sig Dispense Refill    albuterol (PROVENTIL/VENTOLIN HFA) 90 mcg/actuation inhaler Inhale 1-2 puffs into the lungs every 6 (six) hours as needed for Wheezing or Shortness of Breath. Rescue (Patient not taking: Reported on 1/31/2022) 6.7 g 0    alirocumab (PRALUENT PEN) 150 mg/mL PnIj Inject 1 mL (150 mg total) into the skin every 14 (fourteen) days. 8 mL 6    aspirin (ECOTRIN) 81 MG EC tablet Take 81 mg by mouth once  "daily.      avanafiL (STENDRA) 200 mg Tab Take 200 mg by mouth as needed (prn ED). 8 tablet 11    clopidogreL (PLAVIX) 75 mg tablet Take 1 tablet (75 mg total) by mouth once daily. 90 tablet 2    diclofenac sodium (VOLTAREN) 1 % Gel Apply 2 g topically 3 (three) times daily. 1 Tube 3    ezetimibe (ZETIA) 10 mg tablet Take 1 tablet (10 mg total) by mouth once daily. (Patient not taking: Reported on 1/31/2022) 90 tablet 3    famotidine (PEPCID) 20 MG tablet Take 1 tablet (20 mg total) by mouth 2 (two) times daily. 20 tablet 0    JARDIANCE 25 mg tablet TAKE 1 TABLET (25 MG TOTAL) BY MOUTH ONCE DAILY. 30 tablet 11    losartan (COZAAR) 25 MG tablet Take 1 tablet (25 mg total) by mouth once daily. 90 tablet 3    metFORMIN (GLUCOPHAGE) 1000 MG tablet Take 1 tablet (1,000 mg total) by mouth 2 (two) times daily. 180 tablet 1    metoprolol succinate (TOPROL-XL) 25 MG 24 hr tablet Take 1 tablet (25 mg total) by mouth once daily. 30 tablet 6    pulse oximeter (PULSE OXIMETER) device by Apply Externally route 2 (two) times a day. Use twice daily at 8 AM and 3 PM and record the value in [facility] as directed. 1 each 0    semaglutide (RYBELSUS) 7 mg tablet Take 1 tablet (7 mg total) by mouth once daily. 30 tablet 3    sildenafil (REVATIO) 20 mg Tab Take 3 tablets (60 mg total) by mouth as needed (ED). 45 tablet 5    vardenafiL (LEVITRA) 20 MG tablet Take 1 tablet (20 mg total) by mouth daily as needed for Erectile Dysfunction. 8 tablet 11    vardenafiL (STAXYN) 10 mg TbDL Take 10 mg by mouth once daily. 8 tablet 11     No current facility-administered medications on file prior to visit.       Vital signs:  Ht 5' 9" (1.753 m)   Wt 70 kg (154 lb 5.2 oz)   BMI 22.79 kg/m²     Physical Exam  Vitals reviewed.   Constitutional:       Appearance: He is not toxic-appearing.   HENT:      Head: Normocephalic and atraumatic.   Eyes:      General: No scleral icterus.     Conjunctiva/sclera: Conjunctivae normal.   Neurological: "      Mental Status: He is alert and oriented to person, place, and time.      Gait: Gait normal.   Psychiatric:         Mood and Affect: Mood normal.         Behavior: Behavior normal.         I have reviewed prior labs, imaging, notes from last month    Recent labs and CT C/a/p all normal.    Assessment:  1. Weight loss    2. Screen for colon cancer      Unclear etiology of 2 year wt loss,  But no GI symptoms to attribute this to. specicifally no vomiting, abd pain, or diarrhea  Does have uncontrolled DM, last a1c > 10.     Plan:  Orders Placed This Encounter    sodium,potassium,mag sulfates (SUPREP BOWEL PREP KIT) 17.5-3.13-1.6 gram SolR     egd and colonoscopy  egd for wt loss  Colon for screen   - higher than avg risk procedures given the need for continued anticoag.    We discussed that given that we cannot get clearance for him to come off of the blood thinners, these will only be diagnostic evaluations and we will not be able to perform any therapeutic remove her some including biopsies or polyp removals or anything of the like.  He verbally expressed understanding of this and still would like to proceed given his continued concerns with weight loss and he does not want to wait another 4-6 months to come off of the blood thinners.  If polyps or anything similar are found, we will have to plan future procedure at a later date for removal / therapy.     RTC prn    Miles Amador MD  [facility] - [place]

## 2022-02-02 ENCOUNTER — TELEPHONE (OUTPATIENT)
Dept: PHARMACY | Facility: CLINIC | Age: 62
End: 2022-02-02
Payer: COMMERCIAL

## 2022-02-02 ENCOUNTER — TELEPHONE (OUTPATIENT)
Dept: ENDOSCOPY | Facility: HOSPITAL | Age: 62
End: 2022-02-02
Payer: COMMERCIAL

## 2022-02-02 ENCOUNTER — OFFICE VISIT (OUTPATIENT)
Dept: ENDOCRINOLOGY | Facility: CLINIC | Age: 62
End: 2022-02-02
Payer: COMMERCIAL

## 2022-02-02 VITALS
DIASTOLIC BLOOD PRESSURE: 78 MMHG | WEIGHT: 157.31 LBS | HEIGHT: 69 IN | SYSTOLIC BLOOD PRESSURE: 130 MMHG | BODY MASS INDEX: 23.3 KG/M2

## 2022-02-02 DIAGNOSIS — I10 HYPERTENSION, ESSENTIAL: ICD-10-CM

## 2022-02-02 DIAGNOSIS — E78.5 HYPERLIPIDEMIA, UNSPECIFIED HYPERLIPIDEMIA TYPE: ICD-10-CM

## 2022-02-02 DIAGNOSIS — E11.65 UNCONTROLLED TYPE 2 DIABETES MELLITUS WITH HYPERGLYCEMIA: ICD-10-CM

## 2022-02-02 PROCEDURE — 3046F HEMOGLOBIN A1C LEVEL >9.0%: CPT | Mod: CPTII,S$GLB,, | Performed by: GENERAL ACUTE CARE HOSPITAL

## 2022-02-02 PROCEDURE — 1159F MED LIST DOCD IN RCRD: CPT | Mod: CPTII,S$GLB,, | Performed by: GENERAL ACUTE CARE HOSPITAL

## 2022-02-02 PROCEDURE — 3008F BODY MASS INDEX DOCD: CPT | Mod: CPTII,S$GLB,, | Performed by: GENERAL ACUTE CARE HOSPITAL

## 2022-02-02 PROCEDURE — 1160F PR REVIEW ALL MEDS BY PRESCRIBER/CLIN PHARMACIST DOCUMENTED: ICD-10-PCS | Mod: CPTII,S$GLB,, | Performed by: GENERAL ACUTE CARE HOSPITAL

## 2022-02-02 PROCEDURE — 1160F RVW MEDS BY RX/DR IN RCRD: CPT | Mod: CPTII,S$GLB,, | Performed by: GENERAL ACUTE CARE HOSPITAL

## 2022-02-02 PROCEDURE — 1111F PR DISCHARGE MEDS RECONCILED W/ CURRENT OUTPATIENT MED LIST: ICD-10-PCS | Mod: CPTII,S$GLB,, | Performed by: GENERAL ACUTE CARE HOSPITAL

## 2022-02-02 PROCEDURE — 1159F PR MEDICATION LIST DOCUMENTED IN MEDICAL RECORD: ICD-10-PCS | Mod: CPTII,S$GLB,, | Performed by: GENERAL ACUTE CARE HOSPITAL

## 2022-02-02 PROCEDURE — 1111F DSCHRG MED/CURRENT MED MERGE: CPT | Mod: CPTII,S$GLB,, | Performed by: GENERAL ACUTE CARE HOSPITAL

## 2022-02-02 PROCEDURE — 99999 PR PBB SHADOW E&M-EST. PATIENT-LVL V: ICD-10-PCS | Mod: PBBFAC,,, | Performed by: GENERAL ACUTE CARE HOSPITAL

## 2022-02-02 PROCEDURE — 3075F SYST BP GE 130 - 139MM HG: CPT | Mod: CPTII,S$GLB,, | Performed by: GENERAL ACUTE CARE HOSPITAL

## 2022-02-02 PROCEDURE — 99204 PR OFFICE/OUTPT VISIT, NEW, LEVL IV, 45-59 MIN: ICD-10-PCS | Mod: S$GLB,,, | Performed by: GENERAL ACUTE CARE HOSPITAL

## 2022-02-02 PROCEDURE — 3078F DIAST BP <80 MM HG: CPT | Mod: CPTII,S$GLB,, | Performed by: GENERAL ACUTE CARE HOSPITAL

## 2022-02-02 PROCEDURE — 3078F PR MOST RECENT DIASTOLIC BLOOD PRESSURE < 80 MM HG: ICD-10-PCS | Mod: CPTII,S$GLB,, | Performed by: GENERAL ACUTE CARE HOSPITAL

## 2022-02-02 PROCEDURE — 3008F PR BODY MASS INDEX (BMI) DOCUMENTED: ICD-10-PCS | Mod: CPTII,S$GLB,, | Performed by: GENERAL ACUTE CARE HOSPITAL

## 2022-02-02 PROCEDURE — 99999 PR PBB SHADOW E&M-EST. PATIENT-LVL V: CPT | Mod: PBBFAC,,, | Performed by: GENERAL ACUTE CARE HOSPITAL

## 2022-02-02 PROCEDURE — 99204 OFFICE O/P NEW MOD 45 MIN: CPT | Mod: S$GLB,,, | Performed by: GENERAL ACUTE CARE HOSPITAL

## 2022-02-02 PROCEDURE — 3075F PR MOST RECENT SYSTOLIC BLOOD PRESS GE 130-139MM HG: ICD-10-PCS | Mod: CPTII,S$GLB,, | Performed by: GENERAL ACUTE CARE HOSPITAL

## 2022-02-02 PROCEDURE — 3046F PR MOST RECENT HEMOGLOBIN A1C LEVEL > 9.0%: ICD-10-PCS | Mod: CPTII,S$GLB,, | Performed by: GENERAL ACUTE CARE HOSPITAL

## 2022-02-02 RX ORDER — INSULIN DETEMIR 100 [IU]/ML
15 INJECTION, SOLUTION SUBCUTANEOUS NIGHTLY
Qty: 12 ML | Refills: 3 | Status: SHIPPED | OUTPATIENT
Start: 2022-02-02 | End: 2022-05-10

## 2022-02-02 RX ORDER — IBUPROFEN 200 MG
16 TABLET ORAL
Qty: 50 TABLET | Refills: 12 | COMMUNITY
Start: 2022-02-02 | End: 2022-02-10 | Stop reason: SDUPTHER

## 2022-02-02 RX ORDER — PEN NEEDLE, DIABETIC 31 GX5/16"
NEEDLE, DISPOSABLE MISCELLANEOUS
Qty: 100 EACH | Refills: 3 | Status: SHIPPED | OUTPATIENT
Start: 2022-02-02

## 2022-02-02 NOTE — TELEPHONE ENCOUNTER
Left voice and text messages instructing patient to call dept @ [phone] or [phone] between 8am-3pm.    Arrival time to be given @ 0930  EGD/Colon (Suprep)  DO NOT STOP PLAVIX PER DR. ERICA Orozco -Rapid

## 2022-02-02 NOTE — PROGRESS NOTES
Subjective:      Patient ID: Darrell Hinds Jr. is a 61 y.o. male.    Chief Complaint:  DM2; Initial visit     History of Present Illness  60 YO Male w/ dx of DM2, HTN, CAD s/p stent and HLD that presents to the endocrine clinic to establish care.  Pt today reports 30 pound weight loss for the past 1.5 years.  Denies dietary changes or decreased physical activity.  Pt does report uncontrolled glucose along with polyuria, polydipsia and fatigue since being off medications for the past month.       With regards to Diabetes Mellitus:   -Type 2    -Duration:  Dx 20 yrs.  On blood work.    -FH of DM?   Father and Mother DM2,  Brother and sister DM2    -Microvascular complications: (DENIES)   -Macrovascular complications:  (CAD, PVD, CVA)  -DKA?  DENIES   -HHS?  DENIES   -DM Medications:   Metformin 1g BID,  Jardiance 25mg daily and Rybelsus 7mg daily   -Previously tried medications:  NONE   -Compliance w/ Meds:  Was without medications for the past month   -Hyperglycemia symptoms: YES   -Hypoglycemia symptoms:  DENIES   -Know how to correct hypoglycemias:  NO, will do teaching today.    -Glucose monitoring:   HAS FREESTYLE MICHELLE  (PATIENT HAS GLOBAL HYPERGLYCEMIA)   -Diet:  B (Egg, howard, toast, coffee no sugar, milk),  L (Tacos, rice beans, sandwich, burger)  D (Baked potatoe, corn, Sushi),  S (keto snacks, crackers) Drinks (Sprite)   -Activity:   Sedentary   -Pancreatitis?  DENIES   -CHF?  DENIES   -UTIs?  DENIES   -Thyroid CA?  DENIES   -ETOH Abuse?  DENIES     Diabetes Management Status    Statin: Not taking  ACE/ARB: Taking    Screening or Prevention Patient's value Goal Complete/Controlled?   HgA1C Testing and Control   Lab Results   Component Value Date    HGBA1C 10.7 (H) 01/31/2022      Annually/Less than 8% No   Lipid profile : 12/16/2021 Annually Yes   LDL control Lab Results   Component Value Date    LDLCALC 30.6 (L) 12/16/2021    Annually/Less than 100 mg/dl  Yes   Nephropathy screening Lab Results    Component Value Date    LABMICR 7.0 07/22/2021     Lab Results   Component Value Date    PROTEINUA Negative 07/22/2021    Annually Yes   Blood pressure BP Readings from Last 1 Encounters:   01/31/22 114/68    Less than 140/90 Yes   Dilated retinal exam : 03/29/2021 Annually Yes   Foot exam   : 07/29/2021 Annually Yes        ROS:   As above    Objective:     There were no vitals taken for this visit.  BP Readings from Last 3 Encounters:   01/31/22 114/68   01/12/22 (!) 143/79   01/10/22 (!) 141/75     Wt Readings from Last 1 Encounters:   02/01/22 0812 70 kg (154 lb 5.2 oz)     There is no height or weight on file to calculate BMI.      Physical Exam  Vitals reviewed.   Constitutional:       General: He is not in acute distress.     Appearance: Normal appearance. He is well-developed. He is not ill-appearing.   HENT:      Nose: Nose normal. No rhinorrhea.      Mouth/Throat:      Mouth: Mucous membranes are moist.   Eyes:      Extraocular Movements: Extraocular movements intact.      Pupils: Pupils are equal, round, and reactive to light.      Comments: No proptosis, No lid lag, No conjunctival erythema    Neck:      Thyroid: No thyromegaly.      Trachea: No tracheal deviation.      Comments: No thyromegaly or Thyroid nodules palpated on exam   Cardiovascular:      Rate and Rhythm: Normal rate and regular rhythm.      Pulses: Normal pulses.   Pulmonary:      Effort: Pulmonary effort is normal.      Breath sounds: Normal breath sounds.   Abdominal:      Palpations: Abdomen is soft. There is no mass.      Tenderness: There is no abdominal tenderness.      Hernia: No hernia is present.      Comments: No scar tissue, No Violaceous striae    Musculoskeletal:         General: No swelling.      Cervical back: Neck supple. No tenderness.      Right lower leg: No edema.      Left lower leg: No edema.   Skin:     General: Skin is warm.      Findings: No rash.   Neurological:      General: No focal deficit present.      Mental  Status: He is alert and oriented to person, place, and time.   Psychiatric:         Mood and Affect: Mood normal.         Judgment: Judgment normal.       Protective Sensation (w/ 10 gram monofilament):  Right: Intact  Left: Intact    Visual Inspection:  Normal -  Bilateral    Pedal Pulses:   Right: Present  Left: Present    Posterior tibialis:   Right:Present  Left: Present           Lab Review:   Lab Results   Component Value Date    HGBA1C 10.7 (H) 01/31/2022     Lab Results   Component Value Date    CHOL 87 (L) 12/16/2021    HDL 44 12/16/2021    LDLCALC 30.6 (L) 12/16/2021    TRIG 62 12/16/2021    CHOLHDL 50.6 (H) 12/16/2021     Lab Results   Component Value Date     01/10/2022    K 4.8 01/10/2022     01/10/2022    CO2 19 (L) 01/10/2022     (H) 01/10/2022    BUN 25 (H) 01/10/2022    CREATININE 0.9 01/10/2022    CALCIUM 9.3 01/10/2022    PROT 7.0 01/08/2022    ALBUMIN 3.2 (L) 01/08/2022    BILITOT 0.4 01/08/2022    ALKPHOS 60 01/08/2022    AST 19 01/08/2022    ALT 11 01/08/2022    ANIONGAP 15 01/10/2022    ESTGFRAFRICA >60 01/10/2022    EGFRNONAA >60 01/10/2022    TSH 0.686 01/31/2022     Vit D, 25-Hydroxy   Date Value Ref Range Status   07/22/2021 23 (L) 30 - 96 ng/mL Final     Comment:     Vitamin D deficiency.........<10 ng/mL                              Vitamin D insufficiency......10-29 ng/mL       Vitamin D sufficiency........> or equal to 30 ng/mL  Vitamin D toxicity............>100 ng/mL         Assessment and Plan     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  No, Will give referral today    PLAN:     Due to  uncontrolled DM with catabolic symptoms including weight loss.  I will recommend the following.     -DC Rybelsus for now as it may contribute to further weight loss     -Start Levemir 12 units daily (Slightly lower than weight based recommended dosing)     -C/w Metformin 1g BID     -C/w Jardiance 25mg daily (Side effects of dehydration and euglycemic DKA discussed)     -Hypoglycemia action plan and driving precautions discussed     -Keep FS log to send for review in 2 week.     ophthalmology and DM education referrals given       Hyperlipidemia, unspecified hyperlipidemia type  LDL at goal On Statin   Low Fat diet     Hypertension, essential  BP controlled on current BP meds   On ARB  for renal protection   Low Na diet

## 2022-02-02 NOTE — PATIENT INSTRUCTIONS
Due to your history of diabetes which is uncontrolled with significant weight loss at this time and can put your heart at risk.  I will recommend the following.     STOP RYBELSUS FOR NOW to avoid FURTHER WEIGHT LOSS.     We will start Levemir 12 units in the morning once a day (Long acting insulin)     Continue with Metformin twice a day.      Continue with Jardiance 25mg daily (Stay hydrated)     150 grams of carbohydrates daily MAX,  (50 grams or less per meal)     Avoid sugary drinks (Sodas, Sweet Tea, Juices with added sugar, Soft drinks)     Check your sugars 3-4 times daily (Before meals and at bedtime)     Sugar goals before breakfast (70 - 130)  Sugars 2 hours after meals  (less than 180)     Keep sugar log for review at next visit.     Try walking or doing some sort of physical activity at least 30 minutes 3 times a week to increase your sensitivity to insulin, improve sugar levels and hopefully this will help in trying to reduce the doses of your medications in the future.     If having low blood sugar < 70 please correct with 16 grams of carbohydrates or with 4 glucose tablets and re-check your sugars every 15 minutes until symptoms resolve and sugar is above 100.      We will check your A1C and labs prior to next visit.     Ophthalmology appointment once a year.     Diabetes education appointment once a year.     If any questions feel free to contact me.     Have a nice day.     Sincerely,       Contreras Dunn MD   Endocrinology   2/2/2022 11:07 AM                   Eating the Right Number of Calories (9620-2584 Guidelines)    Calories are a measure of the energy you get from food. If you eat more calories than you use, you will gain weight. If you eat fewer calories than you use, you will lose weight. Below are tables that give the number of calories needed each day. Look for your gender, age, and activity level. If you stick to this number, you should neither gain nor lose weight. Note that this is an  estimated number of calories.* Your exact number may differ.    Women  Age in years Low activity level (calories/day) Moderate activity level (calories/day) High activity level (calories/day)   19 to 30 1,800-2,000 2,000-2,200 2,400   31 to 50 1,800 2,000 2,200   51 and older 1,600 1,800 2,000-2,200      Men  Age in years Low activity level  (calories/day) Moderate activity level (calories/day) High activity level (calories/day)   19 to 30 2,400-2,600 2,600-2,800 3,000   31 to 50 2,200-2,400 2,400-2,600 2,800-3,000   51 and older 2,000-2,200 2,200-2,400 2,400-2,800     Activity levels defined  · Low. Only light physical activity such as that done during typical daily life.  · Moderate. Light physical activity done during typical daily life AND physical activity equal to walking about 1.5 to 3 miles a day at 3 to 4 miles per hour.  · High. Light physical activity done during typical daily life AND physical activity equal to walking more than 3 miles a day at 3 to 4 miles per hour.  *From Dietary Guidelines for Americans, 9994-0732, [place] Department of Health and Human Services.    Eat less fat  A gram of fat has almost 2.5 times the calories of a gram of protein or carbohydrates. Try to balance your food choices so that only 20% to 35% of your calories comes from total fat. This means an average of 2½ to 3½ grams of fat for each 100 calories you eat.    Eat more fiber  High-fiber foods are digested more slowly than low-fiber foods, so you feel full longer. Try to get at least 25 grams of fiber each day for a 2000 calorie diet. Foods high in fiber include:  · Vegetables and fruits  · Whole-grain or bran breads, pastas, and cereals  · Legumes (beans) and peas  As you begin to eat more fiber, be sure to drink plenty of water to keep your digestive system working smoothly.    Tips  Do's and don'ts include:   · Dont skip meals. This often leads to overeating later on. Its best to spread your eating throughout the  day.  · Eat a variety of foods, not just a few favorites.  · If you find yourself eating when youre not hungry, ask yourself why. Many of us eat when were bored, stressed, or just to be polite. Listen to your body. If youre not hungry, get busy doing something else instead of eating.  · Eat slower, shooting for 20 to 30 minutes for each meal. It takes 20 minutes for your stomach to tell your brain that its full. Slow eaters tend to eat less and are still satisfied, while fast eaters may tend to be overeaters.   · Pay attention to what you eat. Dont read or watch TV during your meal.     ---------------------------------------------------------------------------------------------------------------------------------------------------    Losing Weight for Heart Health  Excess weight is a major risk factor for heart disease. Losing weight has many benefits including lowering your blood pressure, improving your cholesterol level, and decreasing your risk for diseases such as diabetes and heart disease. It may help keep your arteries open so that your heart can get the oxygen-rich blood it needs. All in all, losing weight makes you healthier.       Exercise with a friend. When activity is fun, you're more likely to stick with it.     Calories and weight loss  · Calories are the fuel your body burns for energy. You get the calories you need from the food you eat. For healthy weight loss, women should eat at least 1,200 calories a day, men at least 1,500.  · When you eat more calories than you need, your body stores the extra calories as fat. One pound of fat equals 3,500 calories.  · To lose weight, try to reduce your total calorie intake by 500 calories. To do this, eat 250 calories less each day. Add activity to burn the other 250 calories. Walking 2.5 miles burns about 250 calories. Other more intense activities can burn more calories in the time you spend doing them, such as swimming and running. It is important  to understand that reducing calorie intake is much more effective at weight loss than is exercise.  · Eat a variety of healthy foods to get the nutrients you need.    Tips for losing weight  · Drink 8 to 10 glasses of water a day.  · Dont skip meals. Instead, eat smaller portions.  · Eat your meals earlier in the day.  · Cut out sugary drinks such as soda and fruit juices.  · Make your later meals lighter than your earlier meals.     What can exercise help?  · Blood sugar. Regular exercise improves blood sugar control by helping your body use insulin.  · Mental and emotional health. Physical activity relieves stress and helps you sleep better.  · Heart health. With regular exercise, you can reduce your risk of heart disease and high blood pressure. You can also improve your cholesterol and triglyceride levels.  · Weight. Exercise helps you lose fat, gain muscle, and control your weight.  · Health of blood vessels and nerves. Activity helps lower blood sugar. This helps prevent damage to blood vessels and nerves that can cause problems with your brain, eyes, feet, and legs.  · Finances. If you manage your blood sugar, you may spend less on medical care.    2 types of exercise  Two types of exercise help your body use blood sugar. Experts advise both types of exercise for people with diabetes:  · Aerobic exercise. This is a rhythmic, repeated, continued movement of large muscle groups for at least 10 minutes at a time. You should do this about 30 minutes a day on most days of the week. Examples include walking, bicycling, jogging, swimming, water aerobics, and many sports.  · Resistance exercise (strength training). This type of exercise uses muscles to move weight or work against resistance. You can do it with free weights, machines, resistance tubing, or your own body weight. Adults with diabetes should aim for 2 to 3 sessions of resistance exercise each week. Its best to skip a day in between.    A goal to shoot  for  Your main goal is to become more active. Even a little bit helps. Choose an activity that you like. Walking is one great form of exercise that everyone can do. Talk to your healthcare provider about any limits you may have before starting with an exercise program. Then aim for 150 minutes a week of physical activity. Dont let more than 2 days go by without exercise. When you are sitting for long periods of time, get up for short sessions of light activity every 30 minutes.    Getting activity into your day  Being more active doesnt have to be hard work. Try these to get more activity into your day:  · Take the stairs instead of the elevator  · Garden, do housework, and yard work  · Choose a parking space farther from the store  · Walk to talk to a co-worker instead of calling  · Take a 10-minute walk around the block at lunch  · Walk to a bus stop a little farther from your home or office  · Walk the dog after dinner     ---------------------------------------------------------------------------------------------------------------------------------------------------    Reading Food Labels  Look for the Nutrition Facts label on packaged foods. Reading labels is a big step toward eating healthier. The tips below help you know what to look for.    1. Serving size. Read this closely because the package, jar, or can may contain more than 1 serving. This is how to measure 1 serving of the food in the package. If you eat more than 1 serving, you get more of everything on the label -- including fat, cholesterol, and calories.  2. Total fat. This tells you how many grams (g) of fat are in 1 serving. Fat is high in calories. A healthy goal is to have less than 25% of your daily calories come from fat.  3. Saturated fat. This tells you how much saturated fat is in 1 serving. Saturated fat raises your cholesterol the most. Look for foods that have little or no saturated fat.  4. Trans fat. This tells you how much trans  fat is in 1 serving. Even a small amount of trans fat can harm your health. Choose foods that have no trans fat.  5. Cholesterol. This tells you how much cholesterol is in 1 serving. For many years, it had been recommended to eat less than 300 milligrams (mg) of cholesterol a day. New guidelines have removed this limitation as cholesterol has been recently shown to not raise blood cholesterol levels as significantly as previously thought. However, many foods high in cholesterol are also high in saturated fat. It is recommended to limit saturated fat in your diet.  6. Calories from fat. This number tells you how many calories from fat are in 1 serving (there are 9 calories per gram of fat). Look for foods with few calories from fat.  7. % Daily value. The higher the number, the more 1 serving has of that nutrient. Look for foods that have low numbers for total fat, saturated fat, cholesterol, and sodium.  8. Sodium. This tells you how much sodium (salt) is in 1 serving. Choose foods with low numbers for sodium.  9. Dietary fiber. This number tells you how much fiber is in 1 serving. Foods that are high in fiber can help you feel full. They can also be good for your heart and digestion. The recommended daily amount of fiber is 25 grams for women and 38 grams for men. After age 50, your daily fiber needs drop to 21 grams for women and 30 grams for men.       ---------------------------------------------------------------------------------------------------------------------------------------------------    Understanding Carbohydrates, Fats, and Protein  Food is a source of fuel and nourishment for your body. Its also a source of pleasure. Having diabetes doesnt mean you have to eat special foods or give up desserts. Instead, your dietitian can show you how to plan meals to suit your body. To start, learn how different foods affect blood sugar.    Carbohydrates  Carbohydrates are the main source of fuel for the body.  Carbohydrates raise blood sugar. Many people think carbohydrates are only found in pasta or bread. But carbohydrates are actually in many kinds of foods:  · Sugars occur naturally in foods such as fruit, milk, honey, and molasses. Sugars can also be added to many foods, from cereals and yogurt to candy and desserts. Sugars raise blood sugar.  · Starches are found in bread, cereals, pasta, and dried beans. Theyre also found in corn, peas, potatoes, yam, acorn squash, and butternut squash. Starches also raise blood sugar.   · Fiber is found in foods such as vegetables, fruits, beans, and whole grains. Unlike other carbs, fiber isnt digested or absorbed. So it doesnt raise blood sugar. In fact, fiber can help keep blood sugar from rising too fast. It also helps keep blood cholesterol at a healthy level.  Did you know?  Even though carbohydrates raise blood sugar, its best to have some in every meal. They are an important part of a healthy diet.     Fat  Fat is an energy source that can be stored until needed. Fat does not raise blood sugar. However, it can raise blood cholesterol, increasing the risk of heart disease. Fat is also high in calories, which can cause weight gain. Not all types of fat are the same.    More Healthy:  · Monounsaturated fats are mostly found in vegetable oils, such as olive, canola, and peanut oils. They are also found in avocados and some nuts. Monounsaturated fats are healthy for your heart. Thats because they lower LDL (unhealthy) cholesterol.  · Polyunsaturated fats are mostly found in vegetable oils, such as corn, safflower, and soybean oils. They are also found in some seeds, nuts, and fish. Polyunsaturated fats lower LDL (unhealthy) cholesterol. So, choosing them instead of saturated fats is healthy for your heart. Certain unsaturated fats can help lower triglycerides.     Less Healthy:  · Saturated fats are found in animal products, such as meat, poultry, whole milk, lard, and  butter. Saturated fats raise LDL cholesterol and are not healthy for your heart.  · Hydrogenated oils and trans fats are formed when vegetable oils are processed into solid fats. They are found in many processed foods. Hydrogenated oils and trans fats raise LDL cholesterol and lower HDL (healthy) cholesterol. They are not healthy for your heart.    Protein  Protein helps the body build and repair muscle and other tissue. Protein has little or no effect on blood sugar. However, many foods that contain protein also contain saturated fat. By choosing low-fat protein sources, you can get the benefits of protein without the extra fat:  · Plant protein is found in dry beans and peas, nuts, and soy products, such as tofu and soymilk. These sources tend to be cholesterol-free and low in saturated fat.  · Animal protein is found in fish, poultry, meat, cheese, milk, and eggs. These contain cholesterol and can be high in saturated fat. Aim for lean, lower-fat choices.    ---------------------------------------------------------------------------------------------------------------------------------------------------    Understanding Carbohydrates    A car needs the right type of fuel to run. And you need the right kind of food to function. To keep your energy level up, your body needs food that has carbohydrates. But carbohydrates raise blood sugar levels higher and faster than other kinds of food. Your dietitian will work with you to figure out the amount of carbohydrates you need.    Starches  Starches are found in grains, some vegetables, and beans. Grain products include bread, pasta, cereal, and tortillas. Starchy vegetables include potatoes, peas, corn, lima beans, yams, and squash. Kidney beans, hassan beans, black beans, garbanzo beans, and lentils also contain starches.    Sugars  Sugars are found naturally in many foods. Or sugar can be added. Foods that contain natural sugar include fruits and fruit juices, dairy  products, honey, and molasses. Added sugars are found in most desserts, processed foods, candy, regular soda, and fruit drinks. These are very helpful for treating low blood sugar, or hypoglycemia. They provide sugar quickly. Try to keep at least 15 to 20 grams of these simple sugars with you at all times. Eat this if you begin to have low blood sugar symptoms.    Fiber  Fiber comes from plant foods. Most fiber isnt digested by the body. Instead of raising blood sugar levels like other carbohydrates, it actually stops blood sugar from rising too fast. Fiber is found in fruits, vegetables, whole grains, beans, peas, and many nuts.    Carb counting  Keep track of the amount of carbohydrates you eat. This can help you keep the right balance of physical activity and medicine. The amount of carbohydrates needed will vary for each person. It depends on many things such as your health, the medicines you take, and how active you are. Your healthcare team will help you figure out the right amount of carbohydrates for you. You may start with around 45 to 60 grams of carbohydrate per meal, depending on your situation. Carb counting is a system that helps you keep track of the carbohydrates you eat at each meal.  Carbohydrates come from a variety of foods. These include grains, starchy vegetables, fruit, milk, beans, and snack foods. You can either count carbohydrate grams or carbohydrate servings. When you count carbohydrate servings, 1 carbohydrate serving = 15 grams of carbohydrates.  Here are some examples of foods containing about 15 grams of carbohydrates (1 serving of carbohydrates):  · 1/2 cup of canned or frozen fruit  · A small piece of fresh fruit (4 ounces)  · 1 slice of bread  · 1/2 cup of oatmeal  · 1/3 cup of rice  · 4 to 6 crackers  · 1/2 English muffin  · 1/2 cup of black beans  · 1/4 of a large baked potato (3 ounces)  · 2/3 cup of plain fat-free yogurt  · 1 cup of soup  · 1/2 cup of casserole  · 6 chicken  nuggets  · 2-inch-square brownie or cake without frosting  · 2 small cookies  · 1/2 cup of ice cream or sherbet    Carb counting is easier when food labels are available. Look at the label to see how many grams of total carbohydrates the food contains. Then you can figure out how much you should eat.  Two very important lines to look at on the label are the serving size and the total carbohydrate amount. Here are some tips for using food labels to count your carbohydrate intake:  · Check the serving size. The information on the label is based on that serving size. If you eat more than the listed serving size, you may have to double or triple the other information on the label.   · Check the total grams of carbohydrates. Total carbohydrate from the label includes sugar, starch, and fiber. Be sure to use the total carbohydrate number and not sugar alone.  · Know how many grams of carbohydrates you can have.  Be familiar with the matching portion sizes.  · Compare labels. Compare the labels of different products, looking at serving sizes and total carbohydrates to find the products that work best for you.   · Don't forget protein and fat. With all the focus on carb counting, it might be easy to forget protein and fat in your meals. Don't forget to include sources of protein and healthy fat to balance your meals.  Its also important to be consistent with the amount and time you eat when taking a fixed dose of diabetes medicine. Work with your healthcare provider or dietitian if you need additional help. He or she can help you keep track of your carbohydrate intake. He or she can also help you figure out how many grams of carbohydrates you should have.      ---------------------------------------------------------------------------------------------------------------------------------------------------    Diabetes: Learning About Serving and Portion Sizes     A good rule of thumb: Devote half your plate to vegetables  and green salad. Split the other half between protein and starchy carbohydrates. Fruit makes a good dessert.     Servings and portions. Whats the difference? These terms can be very confusing. But learning to measure serving sizes can help you figure out how many carbohydrates (carbs) and other foods you eat each day. They are also powerful tools for managing your weight.    Servings and portions  Many different words are used to describe amounts of food. If your health care provider uses a term youre not sure of, dont be afraid to ask. It helps to know the difference between servings and portions:  · A serving size is a fixed size. Food producers use this term to describe their products. For example, the label on a cereal box could say that 1 cup of dry cereal = 1 serving.  · A portion (also called a helping) is how much you eat or how much you put on your plate at a meal. For example, you might eat 2 cups of cereal at breakfast.    Using serving information  The portion you choose to eat (such as 2 cups of cereal) may be more than one serving as listed on the food label (such as 1 cup of cereal). Thats why it helps to measure or weigh the food you eat. Because the food label values are based on servings, youll need to know how many servings you eat at one sitting.     Ounces: 2 to 3 ounces is about the size of your palm.       1 Cup: 1 cup (or a medium-sized piece) is about the size of your fist.       1/2 Cup: 1/2 cup is about the size of your cupped hand.      One tablespoon is about the size of your thumb.  One teaspoon is about the size of the tip of your thumb.    Keeping track of serving sizes  When youre planning for a snack or a meal, keep servings in mind. If you dont have measuring cups or a scale handy, there are ways to eyeball serving sizes, such as comparing your food to the size of your hand (see pictures above).    Managing portion sizes  If your weight is a concern, reducing your  "portions can help. You can eat more than one serving of a food at once. But to keep from eating too much at one meal, learn how to manage your portions. A portion is the amount of each type of food on your plate. See the plate diagram for an example of balanced portions.    ---------------------------------------------------------------------------------------------------------------------------------------------------    Diabetes: Shopping for and Preparing Meals    Having diabetes doesnt mean you have to shop in a special aisle or look for special foods. But you will need to make choices. By comparing items and reading food labels, you can find the healthiest foods for you and your family.  Comparing items  When you shop, compare items to find the best ones for your needs. Keep these facts in mind:  · No sugar added does not mean a product is sugar-free.  · "Sugar-free" means less than 1/2 gram (g) of sugar per serving.  · Fat free means less than 1/2 g of fat per serving. This does not necessarily mean the product is low in calories.  · Low fat means 3 g fat or less per serving. Reduced fat or less fat means 25% less fat than the regular version. Some of this fat may be saturated or trans fat. And calories per serving may be similar to the regular version.    Making small changes  Dont try to change all of your eating habits at once. Here are some ideas to start with:  · Try fat-free or low-fat cheese, milk, and yogurt. Also try leaner cuts of meat. This will help you cut down on saturated fat.  · Try whole-grain breads, brown rice, and whole-wheat pasta.  · Load up on fresh or frozen vegetables. If you buy canned, choose low-sodium vegetables.  · Avoid processed foods as much as possible. They tend to be low in fiber and high in trans fats and sodium.  · Try tofu, soymilk, or meat substitutes.?They can help you cut cholesterol and saturated fat out of your diet.    Learning to read food labels  To " find healthy foods that help you control blood sugar, learn how to read food labels. Look for the Nutrition Facts label on packaged foods. It will tell you how much carbohydrate, sugar, fat, and fiber is in each serving. Then, you can decide whether or not the food fits into your meal plan.    Using the food label  So, once you have the food label, what do you do with it? The food label helps in many ways. Use it to:  · Compare items and decide which is the best for your health needs.  · Track the number of carbohydrates in your portions.  · Figure out how many servings of a food you can have and still stay within the number of carbohydrates for that meal.    Planning meals  For good blood sugar control, plan what and when youll eat. Start by creating a meal plan that includes all the food groups. Then, time your meals to help keep your blood sugar level steady. You may need to adjust your plan for special situations.    Eat from all the food groups  The basis of a healthy meal plan is variety (eating many different types of foods). Look for lean meats, fresh fruits and vegetables, whole grains, and low-fat or non-fat dairy products. Eating a wide variety of foods provides the nutrients your body needs. It can also keep you from getting bored with your meal plan.    Reduce liquid sugars  Extra calories from sodas, sports drinks, and fruit drinks make it hard to keep blood sugar in range. Cut as many liquid sugars from your meal plan as you can. This includes most fruit juices, which are often high in natural or added sugar. Instead, drink plenty of water and other sugar-free beverages.    Eat less fat  If you need to lose some weight, try to reduce the amount of fat in your diet. This can also help lower your cholesterol level to keep blood vessels healthier. Cut fat by using only small amounts of liquid oil for cooking. Read food labels carefully to avoid foods with unhealthy trans fats.    Timing your meals  When  it comes to blood sugar control, when you eat is as important as what you eat. You may need to eat several small meals spaced evenly throughout the day to stay in your target range. So dont skip breakfast or wait until late in the day to get most of your calories. Doing so can cause your blood sugar to rise too high or fall too low.    Cooking wisely  · Broil, steam, bake, or grill meats and vegetables, instead of frying.  · Instead of cream-based sauces or sugary glazes, flavor foods with vegetable purée, lemon or lime juice, or herb seasonings.  · Remove skin from chicken and turkey before serving.  · Look in cookbooks for easy, low-fat, low-sugar recipes. When making your usual recipes, cut sugar by 1/2 and fat by 1/3.      ---------------------------------------------------------------------------------------------------------------------------------------------------    Healthy Meals for Diabetes    Ask your healthcare team to help you make a meal plan that fits your needs. Your meal plan tells you when to eat your meals and snacks, what kinds of foods to eat, and how much of each food to eat. You dont have to give up all the foods you like. But you do need to follow some guidelines.  Choose healthy carbohydrates  Starches, sugars, and fiber are all types of carbohydrates. Fiber can help lower your cholesterol and triglycerides. Fiber is also healthy for your heart. You should have 20 to 35 grams of total fiber each day. Fiber-rich foods include:  · Whole-grain breads and cereals  · Bulgur wheat  · Brown rice     · Whole-wheat pasta  · Fruits and vegetables  · Dry beans, and peas   Keep track of the amount of carbohydrates you eat. This can help you keep the right balance of physical activity and medicine. The amount of carbohydrates needed will vary for each person. It depends on many things such as your health, the medicines you take, and how active you are. Your healthcare team will help you figure out the  right amount of carbohydrates for you. You may start with around 45 to 60 grams of carbohydrates per meal, depending on your situation.   Here are some examples of foods containing about 15 grams of carbohydrates (1 serving of carbohydrates):  · 1/2 cup of canned or frozen fruit  · A small piece of fresh fruit (4 ounces)  · 1 slice of bread  · 1/2 cup of oatmeal  · 1/3 cup of rice  · 4 to 6 crackers  · 1/2 English muffin  · 1/2 cup of black beans  · 1/4 of a large baked potato (3 ounces)  · 2/3 cup of plain fat-free yogurt  · 1 cup of soup  · 1/2 cup of casserole  · 6 chicken nuggets  · 2-inch-square brownie or cake without frosting  · 2 small cookies  · 1/2 cup of ice cream or sherbet    Choose healthy protein foods  Eating protein that is low in fat can help you control your weight. It also helps keep your heart healthy. Low-fat protein foods include:  · Fish  · Plant proteins, such as dry beans and peas, nuts, and soy products like tofu and soymilk  · Lean meat with all visible fat removed  · Poultry with the skin removed  · Low-fat or nonfat milk, cheese, and yogurt    Limit unhealthy fats and sugar  Saturated and trans fats are unhealthy for your heart. They raise LDL (bad) cholesterol. Fat is also high in calories, so it can make you gain weight. To cut down on unhealthy fats and sugar, limit these foods:  · Butter or margarine  · Palm and palm kernel oils and coconut oil  · Cream  · Cheese  · Granado  · Lunch meats     · Ice cream  · Sweet bakery goods such as pies, muffins, and donuts  · Jams and jellies  · Candy bars  · Regular sodas     How much to eat  The amount of food you eat affects your blood sugar. It also affects your weight. Your healthcare team will tell you how much of each type of food you should eat.  · Use measuring cups and spoons and a food scale to measure serving sizes.  · Learn what a correct serving size looks like on your plate. This will help when you are away from home and cant  measure your servings.  · Eat only the number of servings given on your meal plan for each food. Dont take seconds.  · Learn to read food labels. Be sure to look at serving size, total carbohydrates, fiber, calories, sugar, and saturated and trans fats. Look for healthier alternatives to foods that have added sugar.  · Plan ahead for parties so you can still have a good time without going overboard with unhealthy food choices. Set a good example yourself by bringing a healthy dish to pot lucks.     Choose healthy snacks  When it comes to snacks, we usually think about foods with added sugar and fats. But there are many other options for healthier snack choices. Here are a few snack ideas to choose from:  Snacks with less than 5 grams of carbohydrates  · 1 piece of string cheese  · 3 celery sticks plus 1 tablespoon of peanut butter  · 5 cherry tomatoes plus 1 tablespoon of ranch dressing  · 1 hard-boiled egg  · 1/4 cup of fresh blueberries  ·  5 baby carrots  · 1 cup of light popcorn  · 1/2 cup of sugar-free gelatin  · 15 almonds  Snacks with about 10 to 20 grams of carbohydrates  · 1/3 cup of hummus plus 1 cup of fresh cut nonstarchy vegetables (carrots, green peppers, broccoli, celery, or a combination)  · 1/2 cup of fresh or canned fruit plus 1/4 cup of cottage cheese  · 1/2 cup of tuna salad with 4 crackers  · 2 rice cakes and a tablespoon of peanut butter  · 1 small apple or orange  · 3 cups light popcorn  · 1/2 of a turkey sandwich (1 slice of whole-wheat bread, 2 ounces of turkey, and mustard)  Portion sizes are important to controlling your blood sugar and staying at a healthy weight. Stock up on healthy snack items so you always have them on hand.    When to eat  Your meal plan will likely include breakfast, lunch, dinner, and some snacks.  · Try to eat your meals and snacks at about the same times each day.  · Eat all your meals and snacks. Skipping a meal or snack can make your blood sugar drop too low.  It can also cause you to eat too much at the next meal or snack. Then your blood sugar could get too high.    ---------------------------------------------------------------------------------------------------------------------------------------------------    Eating Out When You Have Diabetes  Eating right is an important part of keeping your blood sugar in your target range. You just need to make healthy choices.    Be creative when eating out. Many places dont make you stick strictly to the menu. Instead of ordering a large entree, choose an appetizer or two and a bowl of soup. A mix of side orders can also make a good meal. Read the descriptions of other entrees and specials. If another entree comes with baby carrots, ask for a side order of them even if they dont come with your entree. Ask how food is prepared or if it can be made differently.  Tips for making the most of your meal out  · Ask to have high-fat, high-calorie extras, such as French fries and potato chips, left off your plate so you wont be tempted.   · Ask what substitutions are available. Instead of French fries, you may be able to have a side of salad with low-calorie dressing.  · Order low-fat milk instead of cream for your coffee.  · Ask for vegetables and main courses to be served without sauces, butter, margarine, or oil.  · Be aware of portion size. You dont have to clean your plate. Take half your meal home in a doggie bag to eat the next day.  · Look for heart-healthy or low-fat entrees that include whole grains, vegetables, or fruits.  · Choose foods that are grilled, broiled, or steamed.  · Avoid dishes that are described on the menu as fried, breaded, smothered, rich, or creamy.    Tips for restaurant meals  When you eat away from home try these tips:  · Try to schedule your dining-out meal at your normal meal time. Make a reservation if possible, so you don't have to wait to eat. If you can't make a reservation, try to arrive at  the restaurant at a less-busy time to cut down your wait time. Eat a small fruit or starch snack at your regular mealtime if your restaurant meal is going to be later than usual.   · Call ahead to see if the restaurant can meet your dietary needs if you've never been there before. Or you can go online to see the menu ahead of time.  · Carry some crackers with you in case the restaurant needs you to wait until you can be served.  · Ask how foods are prepared before you order.  · Instead of fried, sautéed, or breaded foods, choose ones that are broiled, steamed, grilled, or baked.  · Ask for sauces, gravies, and dressings on the side.  · Only eat an amount that fits your meal plan. Remember: You can take home the leftovers.  · Save dessert for special occasions. Then choose a small dessert or share one with a friend or family member.    Make healthy choices  Fast food  · Garden salad with light dressing on the side  · Baked potato with vegetables or herbs  · Broiled, roasted, or grilled chicken sandwich  · Sliced turkey or lean roast beef sandwich    Mexican  · Chicken enchilada, without cheese or sour cream   · Small burrito with whole beans and chicken  · Whole beans (not refried) and rice  · Chicken or fish fajitas    Steakhouse  · Grilled or broiled lean cuts of beef  · Baked potato with vegetables or herbs  · Broiled or baked chicken. Dont eat the skin.  · Steamed vegetables    Asian  · Steamed dumplings or potstickers  · Broiled, boiled, or steamed meats or fish  · Sushi or sashimi  · Steamed rice or boiled noodles. One serving is equal to 1/3 cup.      © 6539-2883 [employer]. [address] [place] [address], [place] [address]. All rights reserved. This information is not intended as a substitute for professional medical care. Always follow your healthcare professional's instructions.

## 2022-02-02 NOTE — TELEPHONE ENCOUNTER
Spoke with patient about arrival time @ 0930.   Covid test = Rapid    Prep instructions reviewed: the day before the procedure, follow a clear liquid diet all day, then start the first 1/2 of prep at 5pm and take 2nd 1/2 of prep @ 0430.  Pt must be completely NPO when prep completed @ 0630.              Medications: Do not take Insulin or oral diabetic medications the day of the procedure.  Take as prescribed: heart, seizure and blood pressure medication in the morning with a sip of water (less than an ounce).  Take any breathing medications and bring inhalers to hospital with you Leave all valuables and jewelry at home.     Wear comfortable clothes to procedure to change into hospital gown You cannot drive for 24 hours after your procedure because you will receive sedation for your procedure to make you comfortable.  A ride must be provided at discharge.

## 2022-02-04 ENCOUNTER — TELEPHONE (OUTPATIENT)
Dept: GASTROENTEROLOGY | Facility: HOSPITAL | Age: 62
End: 2022-02-04
Payer: COMMERCIAL

## 2022-02-04 ENCOUNTER — HOSPITAL ENCOUNTER (OUTPATIENT)
Facility: HOSPITAL | Age: 62
Discharge: HOME | End: 2022-02-04
Attending: INTERNAL MEDICINE | Admitting: INTERNAL MEDICINE
Payer: COMMERCIAL

## 2022-02-04 VITALS
HEIGHT: 69 IN | SYSTOLIC BLOOD PRESSURE: 116 MMHG | DIASTOLIC BLOOD PRESSURE: 70 MMHG | OXYGEN SATURATION: 100 % | TEMPERATURE: 98 F | RESPIRATION RATE: 20 BRPM | WEIGHT: 148 LBS | BODY MASS INDEX: 21.92 KG/M2 | HEART RATE: 86 BPM

## 2022-02-04 DIAGNOSIS — R63.4 WEIGHT LOSS: Primary | ICD-10-CM

## 2022-02-04 DIAGNOSIS — Z12.11 SCREEN FOR COLON CANCER: ICD-10-CM

## 2022-02-04 LAB
CTP QC/QA: YES
POCT GLUCOSE: 114 MG/DL (ref 70–110)
SARS-COV-2 AG RESP QL IA.RAPID: NEGATIVE

## 2022-02-04 PROCEDURE — 25000003 PHARM REV CODE 250: Performed by: NURSE ANESTHETIST, CERTIFIED REGISTERED

## 2022-02-04 PROCEDURE — G0121 COLON CA SCRN NOT HI RSK IND: HCPCS | Performed by: INTERNAL MEDICINE

## 2022-02-04 PROCEDURE — 43235 EGD DIAGNOSTIC BRUSH WASH: CPT | Mod: 51,,, | Performed by: INTERNAL MEDICINE

## 2022-02-04 PROCEDURE — 43235 EGD DIAGNOSTIC BRUSH WASH: CPT | Performed by: INTERNAL MEDICINE

## 2022-02-04 PROCEDURE — G0121 COLON CA SCRN NOT HI RSK IND: HCPCS | Mod: ,,, | Performed by: INTERNAL MEDICINE

## 2022-02-04 PROCEDURE — G0121 COLON CA SCRN NOT HI RSK IND: ICD-10-PCS | Mod: ,,, | Performed by: INTERNAL MEDICINE

## 2022-02-04 PROCEDURE — 25000003 PHARM REV CODE 250: Performed by: INTERNAL MEDICINE

## 2022-02-04 PROCEDURE — 82962 GLUCOSE BLOOD TEST: CPT | Performed by: INTERNAL MEDICINE

## 2022-02-04 PROCEDURE — 43235 PR EGD, FLEX, DIAGNOSTIC: ICD-10-PCS | Mod: 51,,, | Performed by: INTERNAL MEDICINE

## 2022-02-04 PROCEDURE — 63600175 PHARM REV CODE 636 W HCPCS: Performed by: NURSE ANESTHETIST, CERTIFIED REGISTERED

## 2022-02-04 PROCEDURE — 37000009 HC ANESTHESIA EA ADD 15 MINS: Performed by: INTERNAL MEDICINE

## 2022-02-04 PROCEDURE — 37000008 HC ANESTHESIA 1ST 15 MINUTES: Performed by: INTERNAL MEDICINE

## 2022-02-04 RX ORDER — SODIUM CHLORIDE 0.9 % (FLUSH) 0.9 %
10 SYRINGE (ML) INJECTION
Status: DISCONTINUED | OUTPATIENT
Start: 2022-02-04 | End: 2022-02-04 | Stop reason: HOSPADM

## 2022-02-04 RX ORDER — DEXTROMETHORPHAN/PSEUDOEPHED 2.5-7.5/.8
DROPS ORAL
Status: COMPLETED | OUTPATIENT
Start: 2022-02-04 | End: 2022-02-04

## 2022-02-04 RX ORDER — SODIUM CHLORIDE 9 MG/ML
INJECTION, SOLUTION INTRAVENOUS CONTINUOUS
Status: DISCONTINUED | OUTPATIENT
Start: 2022-02-04 | End: 2022-02-04 | Stop reason: HOSPADM

## 2022-02-04 RX ORDER — LIDOCAINE HYDROCHLORIDE 20 MG/ML
INJECTION INTRAVENOUS
Status: DISCONTINUED | OUTPATIENT
Start: 2022-02-04 | End: 2022-02-04

## 2022-02-04 RX ORDER — PROPOFOL 10 MG/ML
VIAL (ML) INTRAVENOUS
Status: DISCONTINUED | OUTPATIENT
Start: 2022-02-04 | End: 2022-02-04

## 2022-02-04 RX ORDER — PANTOPRAZOLE SODIUM 40 MG/1
40 TABLET, DELAYED RELEASE ORAL DAILY
Qty: 30 TABLET | Refills: 5 | Status: SHIPPED | OUTPATIENT
Start: 2022-02-04 | End: 2022-07-18 | Stop reason: SDUPTHER

## 2022-02-04 RX ORDER — PROPOFOL 10 MG/ML
VIAL (ML) INTRAVENOUS CONTINUOUS PRN
Status: DISCONTINUED | OUTPATIENT
Start: 2022-02-04 | End: 2022-02-04

## 2022-02-04 RX ADMIN — LIDOCAINE HYDROCHLORIDE 100 MG: 20 INJECTION, SOLUTION INTRAVENOUS at 10:02

## 2022-02-04 RX ADMIN — PROPOFOL 50 MG: 10 INJECTION, EMULSION INTRAVENOUS at 10:02

## 2022-02-04 RX ADMIN — PROPOFOL 150 MCG/KG/MIN: 10 INJECTION, EMULSION INTRAVENOUS at 10:02

## 2022-02-04 RX ADMIN — SIMETHICONE 66 MG: 20 SUSPENSION/ DROPS ORAL at 10:02

## 2022-02-04 RX ADMIN — SODIUM CHLORIDE: 0.9 INJECTION, SOLUTION INTRAVENOUS at 10:02

## 2022-02-04 NOTE — PLAN OF CARE
Admission process complete. Patient ready for procedure. POC reviewed with patient. NPO ststus confirmed. Call bell in reach, bed in lowest postion, side rails up x2

## 2022-02-04 NOTE — PLAN OF CARE
Dr Amador @ bedside to discuss procedure findings. Discharge instructions given. All questions answered. Ambulatory at bedside. PIV removed per order. VSS. Free from nausea and pain. Follow-up care instructions given. Verbalized understanding. Wheeled to front of hospital w/ tech.

## 2022-02-04 NOTE — ANESTHESIA POSTPROCEDURE EVALUATION
Anesthesia Post Evaluation    Patient: Darrell Hinds Jr.    Procedure(s) Performed: Procedure(s) (LRB):  EGD (ESOPHAGOGASTRODUODENOSCOPY) (N/A)  COLONOSCOPY Suprep covid test 9/5/21 (N/A)    Final Anesthesia Type: MAC      Patient location during evaluation: GI PACU  Patient participation: Yes- Able to Participate  Level of consciousness: awake and alert and awake  Post-procedure vital signs: reviewed and stable  Pain management: adequate  Airway patency: patent    PONV status at discharge: No PONV  Anesthetic complications: no      Cardiovascular status: blood pressure returned to baseline, hemodynamically stable and stable  Respiratory status: unassisted, spontaneous ventilation and room air  Hydration status: euvolemic  Follow-up not needed.          Vitals Value Taken Time   BP  02/04/22 1103   Temp  02/04/22 1103   Pulse  02/04/22 1103   Resp  02/04/22 1103   SpO2  02/04/22 1103     See nursing notes for vitals     No case tracking events are documented in the log.      Pain/Marion Score: No data recorded

## 2022-02-04 NOTE — [employer]ATION PATIENT INSTRUCTIONS
Discharge Summary/Instructions after an Endoscopic Procedure  Patient Name: Darrell Hinds  Patient MRN: [id number]  Patient YOB: 1960 Friday, February 4, 2022  Miles Amador MD  Dear patient,  As a result of recent federal legislation (The Federal Cures Act), you may   receive lab or pathology results from your procedure in your [facility]   account before your physician is able to contact you. Your physician or   their representative will relay the results to you with their   recommendations at their soonest availability.  Thank you,  Your health is very important to us during the Covid Crisis. Following your   procedure today, you will receive a daily text for 2 weeks asking about   signs or symptoms of Covid 19.  Please respond to this text when you   receive it so we can follow up and keep you as safe as possible.   RESTRICTIONS:  During your procedure today, you received medications for sedation.  These   medications may affect your judgment, balance and coordination.  Therefore,   for 24 hours, you have the following restrictions:   - DO NOT drive a car, operate machinery, make legal/financial decisions,   sign important papers or drink alcohol.    ACTIVITY:  Today: no heavy lifting, straining or running due to procedural   sedation/anesthesia.  The following day: return to full activity including work.  DIET:  Eat and drink normally unless instructed otherwise.     TREATMENT FOR COMMON SIDE EFFECTS:  - Mild abdominal pain, nausea, belching, bloating or excessive gas:  rest,   eat lightly and use a heating pad.  - Sore Throat: treat with throat lozenges and/or gargle with warm salt   water.  - Because air was used during the procedure, expelling large amounts of air   from your rectum or belching is normal.  - If a bowel prep was taken, you may not have a bowel movement for 1-3 days.    This is normal.  SYMPTOMS TO WATCH FOR AND REPORT TO YOUR PHYSICIAN:  1. Abdominal pain or bloating, other  than gas cramps.  2. Chest pain.  3. Back pain.  4. Signs of infection such as: chills or fever occurring within 24 hours   after the procedure.  5. Rectal bleeding, which would show as bright red, maroon, or black stools.   (A tablespoon of blood from the rectum is not serious, especially if   hemorrhoids are present.)  6. Vomiting.  7. Weakness or dizziness.  GO DIRECTLY TO THE NEAREST EMERGENCY ROOM IF YOU HAVE ANY OF THE FOLLOWING:      Difficulty breathing              Chills and/or fever over 101 F   Persistent vomiting and/or vomiting blood   Severe abdominal pain   Severe chest pain   Black, tarry stools   Bleeding- more than one tablespoon   Any other symptom or condition that you feel may need urgent attention  Your doctor recommends these additional instructions:  If any biopsies were taken, your doctors clinic will contact you in 1 to 2   weeks with any results.  - Discharge patient to home.   - Patient has a contact number available for emergencies.  The signs and   symptoms of potential delayed complications were discussed with the   patient.  Return to normal activities tomorrow.  Written discharge   instructions were provided to the patient.   - Resume previous diet.   - Continue present medications.   - Repeat colonoscopy in 10 years for screening purposes.  For questions, problems or results please call your physician - Miles Amador MD.  EMERGENCY PHONE NUMBER: [phone],  LAB RESULTS: [phone]  IF A COMPLICATION OR EMERGENCY SITUATION ARISES AND YOU ARE UNABLE TO REACH   YOUR PHYSICIAN - GO DIRECTLY TO THE EMERGENCY ROOM.  Miles Amador MD  2/4/2022 10:58:26 AM  This report has been verified and signed electronically.  Dear patient,  As a result of recent federal legislation (The Federal Cures Act), you may   receive lab or pathology results from your procedure in your [facility]   account before your physician is able to contact you. Your physician or   their representative  will relay the results to you with their   recommendations at their soonest availability.  Thank you,  [employer]

## 2022-02-04 NOTE — DISCHARGE INSTRUCTIONS
Patient Education       Colonoscopy Discharge Instructions   About this topic   Colonoscopy is done so your doctor can see the inside of your large intestines, also called your colon, and your rectum. It uses a lighted tube called a scope, which has a tiny camera that can be moved through the large intestine. This may be done to:  · Screen for colon cancer or polyps  · Look for the source of rectal bleeding  · Find the cause of changes in your bowel movement  · Find the cause of belly or rectal pain  · Check results from other tests  · Check your response to treatment for other diseases     What care is needed at home?   · Ask your doctor what you need to do when you go home. Make sure you ask questions if you do not understand what the doctor says. This way you will know what you need to do.  · Rest. Do not drive the rest of the day. Do not sign any important papers or make any important decisions for the next 24 hours.  · You may feel groggy. Take extra care when moving about.  · You may have gas or mild cramping. This is normal.  · A small amount of bleeding may happen during the first few days after your procedure.  · Start back on your normal diet unless you need some changes in your diet.  What follow-up care is needed?   · Your doctor will talk to you about your test results. Your doctor may ask you to make visits to the office to check on your progress. Be sure to keep these visits.  · Ask your doctor when you need to have another colonoscopy. The amount of time between tests is based on what was found during this test. People with no polyps may be able to wait 10 years to have another colonoscopy. Other people may need to have this test repeated in 1 year because of the kind of polyps that were found in their colon. Ask your doctor when you need to come back.  What drugs may be needed?   Ask your doctor what drugs you will need to take. Take your drugs as told by your doctor.  Will physical activity be  limited?   Physical activities may be limited for a short time. Rest after the procedure. You may go back to your normal activities within a day or so.  What changes to diet are needed?   · Drink 6 to 8 glasses of water each day.  · Eat food rich in fiber like fresh fruits and vegetables.  What problems could happen?   · Tear inside your colon  · Bleeding can happen up to a few days afterwards  When do I need to call the doctor?   · Fever of 100.4°F (38°C) or higher, chills  · Bleeding during bowel movements (1 teaspoon (5 mL) or more) or maroon stool  · Throwing up more than 3 times in the next 48 hours  · Feeling dizzy  · Feeling weak  · Belly pain that is getting worse  · Not able to have a bowel movement for more than 2 days  · Hard swollen belly  Teach Back: Helping You Understand   The Teach Back Method helps you understand the information we are giving you. After you talk with the staff, tell them in your own words what you learned. This helps to make sure the staff has described each thing clearly. It also helps to explain things that may have been confusing. Before going home, make sure you can do these:  · I can tell you about my procedure.  · I can tell you what signs are normal after my procedure.  · I can tell you what I will do if I throw up more than 3 times in the next 48 hours or I have more belly pain.  Where can I learn more?   American Cancer Society  https://www.cancer.org/cancer/colon-rectal-cancer/pwqawcimt-awkzhatkp-uoflopb/thrqjinqo-aobwf-jgqa.html   American Society of Clinical Oncology  https://www.cancer.net/navigating-cancer-care/diagnosing-cancer/tests-and-procedures/colonoscopy   Last Reviewed Date   2021-03-10  Consumer Information Use and Disclaimer   This information is not specific medical advice and does not replace information you receive from your health care provider. This is only a brief summary of general information. It does NOT include all information about conditions,  illnesses, injuries, tests, procedures, treatments, therapies, discharge instructions or life-style choices that may apply to you. You must talk with your health care provider for complete information about your health and treatment options. This information should not be used to decide whether or not to accept your health care providers advice, instructions or recommendations. Only your health care provider has the knowledge and training to provide advice that is right for you.  Copyright   Copyright © 2021 [employer] and its affiliates and/or licensors. All rights reserved.

## 2022-02-04 NOTE — TRANSFER OF CARE
"Anesthesia Transfer of Care Note    Patient: Darrell Hinds Jr.    Procedure(s) Performed: Procedure(s) (LRB):  EGD (ESOPHAGOGASTRODUODENOSCOPY) (N/A)  COLONOSCOPY Suprep covid test 9/5/21 (N/A)    Patient location: GI    Anesthesia Type: MAC    Transport from OR: Transported from OR on room air with adequate spontaneous ventilation    Post pain: adequate analgesia    Post assessment: no apparent anesthetic complications and tolerated procedure well    Post vital signs: stable    Level of consciousness: awake and alert    Nausea/Vomiting: no nausea/vomiting    Complications: none    Transfer of care protocol was followed      Last vitals:   Visit Vitals  BP (!) 142/73 (BP Location: Left arm, Patient Position: Lying)   Pulse 90   Temp 36.7 °C (98.1 °F) (Temporal)   Resp 18   Ht 5' 9" (1.753 m)   Wt 67.1 kg (148 lb)   SpO2 100%   BMI 21.86 kg/m²     "

## 2022-02-04 NOTE — ANESTHESIA PREPROCEDURE EVALUATION
02/04/2022  Darrell Hinds Jr. is a 61 y.o., male for EGD and colonoscopy under MAC    Past Medical History:   Diagnosis Date    Cataract     Diabetes mellitus, type 2     Diabetic retinopathy     Encounter for cosmetic surgery     GERD (gastroesophageal reflux disease)     Hyperlipidemia     Hypertension     Hypertrophy of nasal turbinates     Kidney stone 2009    Nasal obstruction     Nasal septal deformity     Unspecified disorder of kidney and ureter      Past Surgical History:   Procedure Laterality Date    COSMETIC SURGERY      Rhinoplasty    EXCISION TURBINATE, SUBMUCOUS      HERNIA REPAIR      Umbilical    LEFT HEART CATHETERIZATION N/A 10/5/2021    Procedure: Left heart cath;  Surgeon: Lawson Faulkner MD;  Location: [facility] CATH LAB;  Service: Cardiology;  Laterality: N/A;    NASAL SEPTUM SURGERY           Anesthesia Evaluation    I have reviewed the Patient Summary Reports.   I have reviewed the NPO Status.   I have reviewed the Medications.     Review of Systems  Social:  Non-Smoker    Cardiovascular:   Hypertension CAD  CABG/stent: stent 10/2021.         Physical Exam  General:  Well nourished    Airway/Jaw/Neck:  Airway Findings: Mallampati: II      Chest/Lungs:  Chest/Lungs Clear    Heart/Vascular:  Heart Findings: Normal            Anesthesia Plan  Type of Anesthesia, risks & benefits discussed:  Anesthesia Type:  MAC    Patient's Preference:   Plan Factors:          Intra-op Monitoring Plan: standard ASA monitors  Intra-op Monitoring Plan Comments:   Post Op Pain Control Plan:   Post Op Pain Control Plan Comments:     Induction:    Beta Blocker:  Patient is not currently on a Beta-Blocker (No further documentation required).       Informed Consent: Patient understands risks and agrees with Anesthesia plan.  Questions answered. Anesthesia consent signed with patient.  ASA Score: 3      Day of Surgery Review of History & Physical:            Ready For Surgery From Anesthesia Perspective.

## 2022-02-04 NOTE — PROVATION PATIENT INSTRUCTIONS
Discharge Summary/Instructions after an Endoscopic Procedure  Patient Name: Darrell Hinds  Patient MRN: [id number]  Patient YOB: 1960 Friday, February 4, 2022  Miles Amador MD  Dear patient,  As a result of recent federal legislation (The Federal Cures Act), you may   receive lab or pathology results from your procedure in your [facility]   account before your physician is able to contact you. Your physician or   their representative will relay the results to you with their   recommendations at their soonest availability.  Thank you,  Your health is very important to us during the Covid Crisis. Following your   procedure today, you will receive a daily text for 2 weeks asking about   signs or symptoms of Covid 19.  Please respond to this text when you   receive it so we can follow up and keep you as safe as possible.   RESTRICTIONS:  During your procedure today, you received medications for sedation.  These   medications may affect your judgment, balance and coordination.  Therefore,   for 24 hours, you have the following restrictions:   - DO NOT drive a car, operate machinery, make legal/financial decisions,   sign important papers or drink alcohol.    ACTIVITY:  Today: no heavy lifting, straining or running due to procedural   sedation/anesthesia.  The following day: return to full activity including work.  DIET:  Eat and drink normally unless instructed otherwise.     TREATMENT FOR COMMON SIDE EFFECTS:  - Mild abdominal pain, nausea, belching, bloating or excessive gas:  rest,   eat lightly and use a heating pad.  - Sore Throat: treat with throat lozenges and/or gargle with warm salt   water.  - Because air was used during the procedure, expelling large amounts of air   from your rectum or belching is normal.  - If a bowel prep was taken, you may not have a bowel movement for 1-3 days.    This is normal.  SYMPTOMS TO WATCH FOR AND REPORT TO YOUR PHYSICIAN:  1. Abdominal pain or bloating, other  than gas cramps.  2. Chest pain.  3. Back pain.  4. Signs of infection such as: chills or fever occurring within 24 hours   after the procedure.  5. Rectal bleeding, which would show as bright red, maroon, or black stools.   (A tablespoon of blood from the rectum is not serious, especially if   hemorrhoids are present.)  6. Vomiting.  7. Weakness or dizziness.  GO DIRECTLY TO THE NEAREST EMERGENCY ROOM IF YOU HAVE ANY OF THE FOLLOWING:      Difficulty breathing              Chills and/or fever over 101 F   Persistent vomiting and/or vomiting blood   Severe abdominal pain   Severe chest pain   Black, tarry stools   Bleeding- more than one tablespoon   Any other symptom or condition that you feel may need urgent attention  Your doctor recommends these additional instructions:  If any biopsies were taken, your doctors clinic will contact you in 1 to 2   weeks with any results.  - Discharge patient to home.   - Patient has a contact number available for emergencies.  The signs and   symptoms of potential delayed complications were discussed with the   patient.  Return to normal activities tomorrow.  Written discharge   instructions were provided to the patient.   - Resume previous diet.   - Continue present medications.   - No biopsies taken due to need for uninterrupted plavix use, i suggest   testing for Hpylori with stool test, then starting protonix daily.   - Repeat upper endoscopy in 1 year for surveillance.   - Perform a colonoscopy today.  For questions, problems or results please call your physician - Miles Amador MD.  EMERGENCY PHONE NUMBER: [phone],  LAB RESULTS: [phone]  IF A COMPLICATION OR EMERGENCY SITUATION ARISES AND YOU ARE UNABLE TO REACH   YOUR PHYSICIAN - GO DIRECTLY TO THE EMERGENCY ROOM.  Miles Amador MD  2/4/2022 10:39:44 AM  This report has been verified and signed electronically.  Dear patient,  As a result of recent federal legislation (The Federal Cures Act), you may    receive lab or pathology results from your procedure in your [facility]   account before your physician is able to contact you. Your physician or   their representative will relay the results to you with their   recommendations at their soonest availability.  Thank you,  PROVATION

## 2022-02-07 ENCOUNTER — TELEPHONE (OUTPATIENT)
Dept: ENDOSCOPY | Facility: HOSPITAL | Age: 62
End: 2022-02-07
Payer: COMMERCIAL

## 2022-02-07 ENCOUNTER — LAB VISIT (OUTPATIENT)
Dept: LAB | Facility: HOSPITAL | Age: 62
End: 2022-02-07
Attending: INTERNAL MEDICINE
Payer: COMMERCIAL

## 2022-02-07 DIAGNOSIS — R63.4 WEIGHT LOSS: ICD-10-CM

## 2022-02-07 PROCEDURE — 87338 HPYLORI STOOL AG IA: CPT | Performed by: INTERNAL MEDICINE

## 2022-02-07 NOTE — TELEPHONE ENCOUNTER
Post procedure f/u call, no answer, message left to call [phone] for any post procedure concerns

## 2022-02-11 ENCOUNTER — SPECIALTY PHARMACY (OUTPATIENT)
Dept: PHARMACY | Facility: CLINIC | Age: 62
End: 2022-02-11
Payer: COMMERCIAL

## 2022-02-14 ENCOUNTER — CLINICAL SUPPORT (OUTPATIENT)
Dept: DIABETES | Facility: CLINIC | Age: 62
End: 2022-02-14
Payer: COMMERCIAL

## 2022-02-14 PROCEDURE — G0108 DIAB MANAGE TRN  PER INDIV: HCPCS | Mod: S$GLB,,, | Performed by: DIETITIAN, REGISTERED

## 2022-02-14 PROCEDURE — 99999 PR PBB SHADOW E&M-EST. PATIENT-LVL I: ICD-10-PCS | Mod: PBBFAC,,, | Performed by: DIETITIAN, REGISTERED

## 2022-02-14 PROCEDURE — G0108 PR DIAB MANAGE TRN  PER INDIV: ICD-10-PCS | Mod: S$GLB,,, | Performed by: DIETITIAN, REGISTERED

## 2022-02-14 PROCEDURE — 99999 PR PBB SHADOW E&M-EST. PATIENT-LVL I: CPT | Mod: PBBFAC,,, | Performed by: DIETITIAN, REGISTERED

## 2022-02-14 NOTE — TELEPHONE ENCOUNTER
Praluent denied. Plan prefers Repatha in 2022. Messaged MD to send rx for Repatha is appropriate.

## 2022-02-14 NOTE — PROGRESS NOTES
Diabetes Care Specialist Progress Note  Author: Adriana Marroquin RD  Date: 2/14/2022    Program Intake  Reason for Diabetes Program Visit:: Initial Diabetes Assessment  Current diabetes risk level:: high  Permission to speak with others about care:: yes    Lab Results   Component Value Date    HGBA1C 10.7 (H) 01/31/2022       Clinical  DM x 20yrs  Levemir 15u Nighly   Jardiance Once Daily  Metformin BID    Freestyle Patricia Reviewed   49%  181-240 42%  > 240 9%     24hr Recall  B - eggs, toast, tomatoes, lettuce   L - chic tim a sandwich, tea, fruit  D - eat out nightly  S - glucerna, beef jerky, madarins  40lb weight loss over the last year    Patient Health Rating  Compared to other people your age, how would you rate your health?: Good    Problem Review  Reviewed Problem List with Patient: yes  Active comorbidities affecting diabetes self-care.: no  Reviewed health maintenance: yes    Clinical Assessment  Current Diabetes Treatment: Oral Medication,Insulin  Have you ever experienced hypoglycemia (low blood sugar)?: no  Have you ever experienced hyperglycemia (high blood sugar)?: no    Medication Information  How many days a week do you miss your medications?: Never  Do you sometimes have difficulty refilling your medications?: No  Medication adherence impacting ability to self-manage diabetes?: No    Labs  Do you have regular lab work to monitor your medications?: Yes  Type of Regular Lab Work: A1c  Where do you get your labs drawn?: [facility]  Lab Compliance Barriers: No    Nutritional Status  Diet: Regular  Dentation:: Intact  Recent Changes in Weight: Weight Loss  Was weight loss intentional or unintentional?: Unintentional  Current nutritional status an area of need that is impacting patient's ability to self-manage diabetes?: Yes    Additional Social History    Support  Does anyone support you with your diabetes care?: yes  Who supports you?: spouse  Who takes you to your medical appointments?: self  Does  the current support meet the patient's needs?: Yes  Is Support an area impacting ability to self-manage diabetes?: No    Access to Mass Media & Technology  Does the patient have access to any of the following devices or technologies?: Smart phone  Media or technology needs impacting ability to self-manage diabetes?: No    Cognitive/Behavioral Health  Alert and Oriented: Yes  Difficulty Thinking: No  Requires Prompting: No  Requires assistance for routine expression?: No  Cognitive or behavioral barriers impacting ability to self-manage diabetes?: No    Culture/Catholic  Culture or Faith beliefs that may impact ability to access healthcare: No    Communication  Language preference: English  Hearing Problems: No  Vision Problems: No  Communication needs impacting ability to self-manage diabetes?: No    Health Literacy  Preferred Learning Method: Face to Face  How often do you need to have someone help you read instructions, pamphlets, or written material from your doctor or pharmacy?: Rarely  Health literacy needs impacting ability to self-manage diabetes?: No      Diabetes Self-Management Skills Assessment    Diabetes Disease Process/Treatment Options  Patient/caregiver able to state what happens when someone has diabetes.: yes  Patient/caregiver knows what type of diabetes they have.: yes  Diabetes Type : Type II  Patient/caregiver able to identify at least three signs and symptoms of diabetes.: yes  Patient able to identify at least three risk factors for diabetes.: yes  Diabetes Disease Process/Treatment Options: Skills Assessment Completed: Yes  Assessment indicates:: Adequate understanding  Area of need?: No    Nutrition/Healthy Eating  Challenges to healthy eating:: portion control  Method of carbohydrate measurement:: no method  Patient can identify foods that impact blood sugar.: yes  Nutrition/Healthy Eating Skills Assessment Completed:: Yes  Assessment indicates:: Knowledge deficit,Instruction  Needed  Area of need?: Yes    Physical Activity/Exercise  Patient's daily activity level:: sedentary  Patient formally exercises outside of work.: no  Patient can identify forms of physical activity.: yes  Patient can identify reasons why exercise/physical activity is important in diabetes management.: yes  Physical Activity/Exercise Skills Assessment Completed: : Yes  Assessment indicates:: Adequate understanding  Area of need?: No    Medications  Patient is able to describe current diabetes management routine.: yes  Patient is able to identify current diabetes medications, dosages, and appropriate timing of medications.: yes  Patient understands the purpose of the medications taken for diabetes.: yes  Patient reports problems or concerns with current medication regimen.: no  Medication Skills Assessment Completed:: Yes  Assessment indicates:: Adequate understanding  Area of need?: No    Home Blood Glucose Monitoring  Patient states that blood sugar is checked at home daily.: yes  Monitoring Method:: personal continuous glucose monitor  Patient is able to use personal CGM appropriately.: yes  CGM Report reviewed?: yes  Home Blood Glucose Monitoring Skills Assessment Completed: : Yes  Assessment indicates:: Adequate understanding  Area of need?: No    Acute Complications  Patient is able to identify types of acute complications: Yes  Acute Complications Skills Assessment Completed: : Yes  Assessment indicates:: Adequate understanding  Area of need?: No    Chronic Complications  Patient can identify major chronic complications of diabetes.: yes  Patient can identify ways to prevent or delay diabetes complications.: yes  Patient is aware that having diabetes increases risk of heart disease?: Yes  Patient is aware that heart disease is the leading cause of death and disability in people with diabetes?: Yes  Patient able to state risk factors for heart disease?: Yes  Patient is taking statin?: Yes  Chronic Complications  Skills Assessment Completed: : Yes  Assessment indicates:: Adequate understanding  Area of need?: No    Psychosocial/Coping  Patient can identify ways of coping with chronic disease.: yes  Psychosocial/Coping Skills Assessment Completed: : Yes  Assessment indicates:: Adequate understanding  Area of need?: No      Diabetes Self Support Plan         Assessment Summary and Plan    Based on today's diabetes care assessment, the following areas of need were identified:      Social 2/14/2022   Support No   Access to Mass Media/Tech No   Cognitive/Behavioral Health No   Culture/Scientology No   Communication No   Health Literacy No        Clinical 2/14/2022   Medication Adherence No   Lab Compliance No   Nutritional Status Yes        Diabetes Self-Management Skills 2/14/2022   Diabetes Disease Process/Treatment Options No   Nutrition/Healthy Eating Yes - see care plan   Physical Activity/Exercise No   Medication No   Home Blood Glucose Monitoring No   Acute Complications No   Chronic Complications No   Psychosocial/Coping No          Today's interventions were provided through individual discussion, instruction, and written materials were provided.      Patient verbalized understanding of instruction and written materials.  Pt was able to return back demonstration of instructions today. Patient understood key points, needs reinforcement and further instruction.     Diabetes Self-Management Care Plan:    Today's Diabetes Self-Management Care Plan was developed with Darrell's input. Darrell has agreed to work toward the following goal(s) to improve his/her overall diabetes control.      Care Plan: Diabetes Management   Updates made since 1/15/2022 12:00 AM      Problem: Healthy Eating       Goal: Eat 3 meals daily with 45g of Carbohydrate per meal.    Start Date: 2/14/2022   Expected End Date: 6/1/2022   Priority: High   Barriers: No Barriers Identified      Task: Reviewed the sources and role of Carbohydrate, Protein, and Fat  and how each nutrient impacts blood sugar. Completed 2/14/2022      Task: Provided visual examples using dry measuring cups, food models, and other familiar objects such as computer mouse, deck or cards, tennis ball etc. to help with visualization of portions. Completed 2/14/2022      Task: Explained how to count carbohydrates using the food label and the use of dry measuring cups for accurate carb counting. Completed 2/14/2022      Task: Discussed strategies for choosing healthier menu options when dining out. Completed 2/14/2022      Task: Recommended replacing beverages containing high sugar content with noncaloric/sugar free options and/or water. Completed 2/14/2022      Task: Review the importance of balancing carbohydrates with each meal using portion control techniques to count servings of carbohydrate and label reading to identify serving size and amount of total carbs per serving. Completed 2/14/2022      Task: Provided Sample plate method and reviewed the use of the plate to estimate amounts of carbohydrate per meal. Completed 2/14/2022          Follow Up Plan     Follow up in about 8 weeks (around 4/11/2022).    Today's care plan and follow up schedule was discussed with patient.  Darrell verbalized understanding of the care plan, goals, and agrees to follow up plan.        The patient was encouraged to communicate with his/her health care provider/physician and care team regarding his/her condition(s) and treatment.  I provided the patient with my contact information today and encouraged to contact me via phone or [facility] Portal as needed.     Length of Visit   Total Time: 60 Minutes

## 2022-02-15 ENCOUNTER — SPECIALTY PHARMACY (OUTPATIENT)
Dept: PHARMACY | Facility: CLINIC | Age: 62
End: 2022-02-15
Payer: COMMERCIAL

## 2022-02-15 RX ORDER — EVOLOCUMAB 140 MG/ML
140 INJECTION, SOLUTION SUBCUTANEOUS
Qty: 140 ML | Refills: 6 | Status: SHIPPED | OUTPATIENT
Start: 2022-02-15 | End: 2022-05-20 | Stop reason: DRUGHIGH

## 2022-02-15 RX ORDER — EVOLOCUMAB 140 MG/ML
140 INJECTION, SOLUTION SUBCUTANEOUS
COMMUNITY
End: 2022-02-15 | Stop reason: SDUPTHER

## 2022-02-16 LAB
H PYLORI AG STL QL IA: NORMAL
SPECIMEN SOURCE: NORMAL

## 2022-02-21 ENCOUNTER — TELEPHONE (OUTPATIENT)
Dept: CARDIOLOGY | Facility: CLINIC | Age: 62
End: 2022-02-21
Payer: COMMERCIAL

## 2022-02-21 DIAGNOSIS — E78.5 HYPERLIPIDEMIA ASSOCIATED WITH TYPE 2 DIABETES MELLITUS: Primary | ICD-10-CM

## 2022-02-21 DIAGNOSIS — E11.69 HYPERLIPIDEMIA ASSOCIATED WITH TYPE 2 DIABETES MELLITUS: Primary | ICD-10-CM

## 2022-02-21 NOTE — TELEPHONE ENCOUNTER
Cristy called stating pt has not started taking Repatha because they are waiting for PA. I advised Cristy to contact [facility] to speak w/them regarding the PA, Cristy verbalized understanding. Cristy also stated pt is not taking Zetia and thought that Dr. Oropeza advised pt to stop Zetia. I advised Cristy that I see in pt's chart that Zetia was taken off at [facility] NP office in error and that I don't see that pt was advised to stop taking Zetia. Should pt be on Zetia? Please advise.

## 2022-02-21 NOTE — TELEPHONE ENCOUNTER
BI: Complete Repatha    RX [employer] PCN PEU per prc-website    Deductible: $500.00, $0.00 accumulated  Max OOP: $8700.00, $682.93 (accumulated)  Estimated Copay: $495.01. Applied for copay card, $5.00 copay, added in WAMB.  [facility] is in network  PA approved until 2/15/23    Copay card: Repatha    BIN: [id number]  PCN: [id number]   ID: [id number]  GRP: [id number]    FA complete

## 2022-02-21 NOTE — TELEPHONE ENCOUNTER
----- Message from Marcus Trujillo sent at 2/21/2022  8:26 AM CST -----  Name Of Caller: Cristy        Provider Name: Vanessa Oropeza        Does patient feel the need to be seen today? no        Relationship to the Pt?: randi        Contact Preference?: [phone]        What is the nature of the call?: Patient's rx for evolocumab (REPATHA SURECLICK) 140 mg/mL PnIj was declined for refill by his insurance company, wants to know if the office is going to appeal that decision or prescribe a new medication for him.

## 2022-02-21 NOTE — TELEPHONE ENCOUNTER
Incoming call from patient's partner Lizet. Explained the FA process and the copay through the insurance. Permission received for [facility] to apply for FA on pt's behalf. FA team was unavailable during this call to discuss available assistance options, and patient and partner agreed to a call back later today.

## 2022-02-22 ENCOUNTER — LAB VISIT (OUTPATIENT)
Dept: LAB | Facility: HOSPITAL | Age: 62
End: 2022-02-22
Attending: INTERNAL MEDICINE
Payer: COMMERCIAL

## 2022-02-22 ENCOUNTER — SPECIALTY PHARMACY (OUTPATIENT)
Dept: PHARMACY | Facility: CLINIC | Age: 62
End: 2022-02-22
Payer: COMMERCIAL

## 2022-02-22 DIAGNOSIS — E11.69 HYPERLIPIDEMIA ASSOCIATED WITH TYPE 2 DIABETES MELLITUS: Primary | ICD-10-CM

## 2022-02-22 DIAGNOSIS — E78.5 HYPERLIPIDEMIA ASSOCIATED WITH TYPE 2 DIABETES MELLITUS: ICD-10-CM

## 2022-02-22 DIAGNOSIS — E11.69 HYPERLIPIDEMIA ASSOCIATED WITH TYPE 2 DIABETES MELLITUS: ICD-10-CM

## 2022-02-22 DIAGNOSIS — E78.5 HYPERLIPIDEMIA ASSOCIATED WITH TYPE 2 DIABETES MELLITUS: Primary | ICD-10-CM

## 2022-02-22 LAB
ALBUMIN SERPL BCP-MCNC: 3.8 G/DL (ref 3.5–5.2)
ALP SERPL-CCNC: 61 U/L (ref 55–135)
ALT SERPL W/O P-5'-P-CCNC: 15 U/L (ref 10–44)
AST SERPL-CCNC: 14 U/L (ref 10–40)
BILIRUB DIRECT SERPL-MCNC: 0.2 MG/DL (ref 0.1–0.3)
BILIRUB SERPL-MCNC: 0.5 MG/DL (ref 0.1–1)
CHOLEST SERPL-MCNC: 115 MG/DL (ref 120–199)
CHOLEST/HDLC SERPL: 2.4 {RATIO} (ref 2–5)
HDLC SERPL-MCNC: 47 MG/DL (ref 40–75)
HDLC SERPL: 40.9 % (ref 20–50)
LDLC SERPL CALC-MCNC: 50.4 MG/DL (ref 63–159)
NONHDLC SERPL-MCNC: 68 MG/DL
PROT SERPL-MCNC: 6.4 G/DL (ref 6–8.4)
TRIGL SERPL-MCNC: 88 MG/DL (ref 30–150)

## 2022-02-22 PROCEDURE — 80076 HEPATIC FUNCTION PANEL: CPT | Performed by: INTERNAL MEDICINE

## 2022-02-22 PROCEDURE — 36415 COLL VENOUS BLD VENIPUNCTURE: CPT | Mod: PO | Performed by: INTERNAL MEDICINE

## 2022-02-22 PROCEDURE — 80061 LIPID PANEL: CPT | Performed by: INTERNAL MEDICINE

## 2022-02-22 NOTE — TELEPHONE ENCOUNTER
"Specialty Pharmacy - Initial Clinical Assessment    Specialty Medication Orders Linked to Encounter    Flowsheet Row Most Recent Value   Medication #1 evolocumab (REPATHA SURECLICK) 140 mg/mL PnIj (Order#[id number], Rx#[id number])        Patient Diagnosis   E11.69, E78.5 - Hyperlipidemia associated with type 2 diabetes mellitus    Emily Hinds Jr. is a 61 y.o. male, who is followed by the specialty pharmacy service for management and education.    Recent Encounters     Date Type Provider Description    02/22/2022 Specialty Pharmacy Angela Almeida, Sheree Initial Clinical Assessment    02/15/2022 Specialty Pharmacy Angela Almeida, Sheree Referral Authorization    02/11/2022 Specialty Pharmacy Zeina Boss, Sheree Refill Coordination; Referral Authorization    01/14/2022 Specialty Pharmacy Nadeem Monte, VinhD Refill Coordination    12/13/2021 Specialty Pharmacy Erin De Jesus Refill Coordination        Clinical call attempts since last clinical assessment   No call attempts found.     Current Outpatient Medications   Medication Sig    albuterol (PROVENTIL/VENTOLIN HFA) 90 mcg/actuation inhaler Inhale 1-2 puffs into the lungs every 6 (six) hours as needed for Wheezing or Shortness of Breath. Rescue (Patient not taking: No sig reported)    aspirin (ECOTRIN) 81 MG EC tablet Take 81 mg by mouth once daily.    avanafiL (STENDRA) 200 mg Tab Take 200 mg by mouth as needed (prn ED).    BD ULTRA-FINE NAT PEN NEEDLE 32 gauge x 5/32" Ndle ONCE A DAY    clopidogreL (PLAVIX) 75 mg tablet Take 1 tablet (75 mg total) by mouth once daily.    diclofenac sodium (VOLTAREN) 1 % Gel Apply 2 g topically 3 (three) times daily.    evolocumab (REPATHA SURECLICK) 140 mg/mL PnIj Inject 1 mL (140 mg total) into the skin every 14 (fourteen) days.    famotidine (PEPCID) 20 MG tablet Take 1 tablet (20 mg total) by mouth 2 (two) times daily.    glucose 4 GM chewable tablet Take 4 tablets (16 g total) by mouth as " needed for Low blood sugar (If having symptoms of blurry vision, palpitations, confusion, shakiness.  Please check sugars and if sugar below 70 please take 4 tablets and re-check sugar everry 15 minutes until sugars are above 70 and symptoms resolve.).    insulin detemir U-100 (LEVEMIR FLEXTOUCH U-100 INSULN) 100 unit/mL (3 mL) InPn pen Inject 15 Units into the skin every evening.    JARDIANCE 25 mg tablet TAKE 1 TABLET (25 MG TOTAL) BY MOUTH ONCE DAILY.    losartan (COZAAR) 25 MG tablet Take 1 tablet (25 mg total) by mouth once daily.    metFORMIN (GLUCOPHAGE) 1000 MG tablet Take 1 tablet (1,000 mg total) by mouth 2 (two) times daily.    metoprolol succinate (TOPROL-XL) 25 MG 24 hr tablet Take 1 tablet (25 mg total) by mouth once daily.    pantoprazole (PROTONIX) 40 MG tablet Take 1 tablet (40 mg total) by mouth once daily.    pulse oximeter (PULSE OXIMETER) device by Apply Externally route 2 (two) times a day. Use twice daily at 8 AM and 3 PM and record the value in MyChart as directed.    semaglutide (RYBELSUS) 7 mg tablet Take 1 tablet (7 mg total) by mouth once daily.    sildenafil (REVATIO) 20 mg Tab Take 3 tablets (60 mg total) by mouth as needed (ED).    vardenafiL (LEVITRA) 20 MG tablet Take 1 tablet (20 mg total) by mouth daily as needed for Erectile Dysfunction.    vardenafiL (STAXYN) 10 mg TbDL Take 10 mg by mouth once daily.   Last reviewed on 2/4/2022  9:49 AM by Roly Boss CRNA    Review of patient's allergies indicates:  No Known AllergiesLast reviewed on  2/4/2022 11:04 AM by Skye Coffman    Drug Interactions    Clinically relevant drug interactions identified: no           Assessment Questions - Documented Responses    Flowsheet Row Most Recent Value   Assessment    Status of the patients ability to self-administer: Caregiver to administer   All education points have been covered with patient? No, patient declined- printed education provided   Welcome packet contents reviewed  "and discussed with patient? No   Assesment completed? Yes   Plan Therapy being initiated   Do you need to open a clinical intervention (i-vent)? No   Do you want to schedule first shipment? Yes   Medication #1 Assessment Info    Patient status New medication, Exisiting to [facility]   Is this medication appropriate for the patient? Yes   Is this medication effective? Not yet started        Refill Questions - Documented Responses    Flowsheet Row Most Recent Value   Patient Availability and HIPAA Verification    Does patient want to proceed with activity? Yes   HIPAA/medical authority confirmed? Yes   Relationship to patient of person spoken to? Spouse/Significant Other   Refill Screening Questions    When does the patient need to receive the medication? 03/01/22   Refill Delivery Questions    How will the patient receive the medication? Delivery Sandy   When does the patient need to receive the medication? 03/01/22   Shipping Address Home   Address in [facility] confirmed and updated if neccessary? Yes   Expected Copay ($) 5   Is the patient able to afford the medication copay? Yes   Payment Method CC on file   Days supply of Refill 28   Supplies needed? No supplies needed   Refill activity completed? Yes   Refill activity plan Refill scheduled   Shipment/Pickup Date: 02/23/22          Objective    He has a past medical history of Cataract, Diabetes mellitus, type 2, Diabetic retinopathy, Encounter for cosmetic surgery, GERD (gastroesophageal reflux disease), Hyperlipidemia, Hypertension, Hypertrophy of nasal turbinates, Kidney stone (2009), Nasal obstruction, Nasal septal deformity, and Unspecified disorder of kidney and ureter.    BP Readings from Last 4 Encounters:   02/04/22 116/70   02/02/22 130/78   01/31/22 114/68   01/12/22 (!) 143/79     Ht Readings from Last 4 Encounters:   02/04/22 5' 9" (1.753 m)   02/02/22 5' 9" (1.753 m)   02/01/22 5' 9" (1.753 m)   01/31/22 5' 9" (1.753 m)     Wt Readings from Last 4 " Encounters:   02/04/22 67.1 kg (148 lb)   02/02/22 71.3 kg (157 lb 4.8 oz)   02/01/22 70 kg (154 lb 5.2 oz)   01/31/22 70.6 kg (155 lb 10.3 oz)       The goals of prescribed drug therapy management include:  · Supporting patient to meet the prescriber's medical treatment objectives  · Improving or maintaining quality of life  · Maintaining optimal therapy adherence  · Minimizing and managing side effects           Assessment/Plan  Patient plans to start therapy on 03/01/22      Indication, dosage, appropriateness, effectiveness, safety and convenience of his specialty medication(s) were reviewed today.     Patient Education   Pharmacist offer to  patient was declined. Printed educational materials will be provided with medication.     Repatha consult declined. Pt switching from Praluent to Repatha due to insurance preference. Spoke to pt's randi Hinds. She is injecting him on the 1st and 15th and plans to resume the same dosing schedule. She feels comfortable with injection process and did not want to review. She is familiar with [facility] refill process and services. [facility] will call in 3 weeks for refills. She had no further questions or concerns and was encouraged to reach out to [facility] if any arise.    Tasks added this encounter   3/22/2022 - Refill Call (Auto Added)   Tasks due within next 3 months   No tasks due.     Angela Almeida, PharmD  [facility]  [address]  [place] [address]  Phone: [phone]  Fax: [phone]

## 2022-02-23 ENCOUNTER — TELEPHONE (OUTPATIENT)
Dept: CARDIOLOGY | Facility: CLINIC | Age: 62
End: 2022-02-23
Payer: COMMERCIAL

## 2022-02-23 DIAGNOSIS — E11.69 HYPERLIPIDEMIA ASSOCIATED WITH TYPE 2 DIABETES MELLITUS: Primary | ICD-10-CM

## 2022-02-23 DIAGNOSIS — E78.5 HYPERLIPIDEMIA ASSOCIATED WITH TYPE 2 DIABETES MELLITUS: Primary | ICD-10-CM

## 2022-02-23 RX ORDER — EZETIMIBE 10 MG/1
10 TABLET ORAL DAILY
COMMUNITY
End: 2022-02-23 | Stop reason: SDUPTHER

## 2022-02-23 RX ORDER — EZETIMIBE 10 MG/1
10 TABLET ORAL DAILY
Qty: 30 TABLET | Refills: 6 | Status: SHIPPED | OUTPATIENT
Start: 2022-02-23 | End: 2022-09-18

## 2022-02-23 NOTE — PROGRESS NOTES
Based on the most recent report I would restart Zetia 10 mg daily and hold off Repatha for now.  HE should follow strict low cholesterol diet and we will repeat this profile in 3 months.

## 2022-02-23 NOTE — TELEPHONE ENCOUNTER
----- Message from Vanessa Oropeza MD sent at 2/23/2022 10:22 AM CST -----  Based on the most recent report I would restart Zetia 10 mg daily and hold off Repatha for now.  HE should follow strict low cholesterol diet and we will repeat this profile in 3 months.

## 2022-02-23 NOTE — TELEPHONE ENCOUNTER
Incoming call from patient's wife reporting that provider wants patient to hold off on injecting Repatha and may be restarted on Zetia instead (confirmed by telephone documentation by provider's staff). Repatha was delivered to patient's home today. Advised that Repatha may be stored in the refrigerator until expiration date on box. Patient may follow up with provider regarding next steps. Patient's wife verbalized understanding and had no further questions. Dis-enrolling patient from [facility] services at this time.

## 2022-03-14 ENCOUNTER — PATIENT OUTREACH (OUTPATIENT)
Dept: ADMINISTRATIVE | Facility: OTHER | Age: 62
End: 2022-03-14
Payer: COMMERCIAL

## 2022-03-14 NOTE — PROGRESS NOTES
Health Maintenance Due   Topic Date Due    COVID-19 Vaccine (1) Never done    HIV Screening  Never done    High Dose Statin  Never done    Shingles Vaccine (1 of 2) Never done    Influenza Vaccine (1) 09/01/2021     Updates were requested from care everywhere.  Chart was reviewed for overdue Proactive [facility] Encounters (ANA) topics (CRS, Breast Cancer Screening, Eye exam)  Health Maintenance has been updated.  LINKS immunization registry triggered.  Immunizations were reconciled.

## 2022-04-13 ENCOUNTER — PATIENT MESSAGE (OUTPATIENT)
Dept: INTERNAL MEDICINE | Facility: CLINIC | Age: 62
End: 2022-04-13
Payer: COMMERCIAL

## 2022-04-13 RX ORDER — FLASH GLUCOSE SENSOR
1 KIT MISCELLANEOUS
Qty: 2 KIT | Refills: 11 | Status: SHIPPED | OUTPATIENT
Start: 2022-04-13 | End: 2022-05-11

## 2022-04-18 ENCOUNTER — PATIENT MESSAGE (OUTPATIENT)
Dept: ADMINISTRATIVE | Facility: OTHER | Age: 62
End: 2022-04-18
Payer: COMMERCIAL

## 2022-04-28 ENCOUNTER — PATIENT OUTREACH (OUTPATIENT)
Dept: ADMINISTRATIVE | Facility: OTHER | Age: 62
End: 2022-04-28
Payer: COMMERCIAL

## 2022-05-01 NOTE — PROGRESS NOTES
Subjective:      Patient ID: Darrell Hinds Jr. is a 61 y.o. male.    Chief Complaint:  DM2; Initial visit     History of Present Illness  60 YO Male w/ dx of DM2, HTN, CAD s/p stent and HLD that presents to the endocrine clinic for follow up.  Pt today reports feels well and endorses some improvement of hyperglycemia since last visit.  Pt reports that he notices that he is waking up with hyperglycemias despite not eating much the night prior.  Denies hypoglycemias since last visit.  Denies hyperglycemic symptoms.       Interval history:     Pt was last seen on 2/2022 and at that time pat was having significant hyperglycemias due to being off meds and plan was to DC Rybelsus due to weight loss, Start Levemir 14 units daily, C/w Metformin 1g BID, C/w Jardiance 25mg daily         With regards to Diabetes Mellitus:   -Type 2    -Duration:  Dx 20 yrs.  On blood work.    -FH of DM?   Father and Mother DM2,  Brother and sister DM2    -Microvascular complications: (DENIES)   -Macrovascular complications:  (CAD s/p stent)   -DKA?  DENIES   -HHS?  DENIES   -DM Medications:   Metformin 1g BID,  Jardiance 25mg daily and Levemir 14 units  -Previously tried medications:  NONE   -Compliance w/ Meds:   (Misses some doses of Metformin or Jardiance)     -Hyperglycemia symptoms: DENIES   -Hypoglycemia symptoms:  DENIES   -Know how to correct hypoglycemias:  Yes, glucose tablets          -Glucose monitoring:   HAS FREESTYLE MICHELLE  (PATIENT HAS GLOBAL HYPERGLYCEMIA)   -Diet:  B (Egg, howard, toast, coffee no sugar, milk),  L (Chicken, Taco chips, tacos  3 times a week)  D (Baked potatoe, corn, Sushi),  S (keto snacks, crackers) Drinks (Denies)   -Activity:   Sedentary   -Pancreatitis?  DENIES   -CHF?  DENIES   -UTIs?  DENIES   -Thyroid CA?  DENIES   -ETOH Abuse?  DENIES     Diabetes Management Status    Statin: Not taking  ACE/ARB: Taking    Screening or Prevention Patient's value Goal Complete/Controlled?   HgA1C Testing and Control    Lab Results   Component Value Date    HGBA1C 9.1 (H) 04/25/2022      Annually/Less than 8% No   Lipid profile : 02/22/2022 Annually Yes   LDL control Lab Results   Component Value Date    LDLCALC 50.4 (L) 02/22/2022    Annually/Less than 100 mg/dl  Yes   Nephropathy screening Lab Results   Component Value Date    LABMICR 6.0 04/25/2022     Lab Results   Component Value Date    PROTEINUA Negative 07/22/2021    Annually Yes   Blood pressure BP Readings from Last 1 Encounters:   02/04/22 116/70    Less than 140/90 Yes   Dilated retinal exam : 03/29/2021 Annually Yes   Foot exam   : 02/02/2022 Annually Yes        ROS:   As above    Objective:     There were no vitals taken for this visit.  BP Readings from Last 3 Encounters:   02/04/22 116/70   02/02/22 130/78   01/31/22 114/68     Wt Readings from Last 1 Encounters:   02/04/22 0955 67.1 kg (148 lb)     There is no height or weight on file to calculate BMI.      Physical Exam  Vitals reviewed.   Constitutional:       General: He is not in acute distress.     Appearance: Normal appearance. He is well-developed. He is not ill-appearing.   HENT:      Nose: Nose normal. No rhinorrhea.      Mouth/Throat:      Mouth: Mucous membranes are moist.   Eyes:      Extraocular Movements: Extraocular movements intact.      Pupils: Pupils are equal, round, and reactive to light.      Comments: No proptosis, No lid lag, No conjunctival erythema    Neck:      Thyroid: No thyromegaly.      Trachea: No tracheal deviation.      Comments: No thyromegaly or Thyroid nodules palpated on exam   Cardiovascular:      Rate and Rhythm: Normal rate and regular rhythm.      Pulses: Normal pulses.   Pulmonary:      Effort: Pulmonary effort is normal.      Breath sounds: Normal breath sounds.   Abdominal:      Palpations: Abdomen is soft. There is no mass.      Tenderness: There is no abdominal tenderness.      Hernia: No hernia is present.      Comments: No scar tissue, No Violaceous striae     Musculoskeletal:         General: No swelling.      Cervical back: Neck supple. No tenderness.      Right lower leg: No edema.      Left lower leg: No edema.   Skin:     General: Skin is warm.      Findings: No rash.   Neurological:      General: No focal deficit present.      Mental Status: He is alert and oriented to person, place, and time.   Psychiatric:         Mood and Affect: Mood normal.         Judgment: Judgment normal.                  Lab Review:   Lab Results   Component Value Date    HGBA1C 9.1 (H) 04/25/2022     Lab Results   Component Value Date    CHOL 115 (L) 02/22/2022    HDL 47 02/22/2022    LDLCALC 50.4 (L) 02/22/2022    TRIG 88 02/22/2022    CHOLHDL 40.9 02/22/2022     Lab Results   Component Value Date     04/25/2022    K 5.6 (H) 04/25/2022     04/25/2022    CO2 24 04/25/2022     (H) 04/25/2022    BUN 17 04/25/2022    CREATININE 0.75 04/25/2022    CALCIUM 9.0 04/25/2022    PROT 6.4 02/22/2022    ALBUMIN 4.3 04/25/2022    BILITOT 0.5 02/22/2022    ALKPHOS 61 02/22/2022    AST 14 02/22/2022    ALT 15 02/22/2022    ANIONGAP 13 04/25/2022    ESTGFRAFRICA >60.0 04/25/2022    EGFRNONAA >60.0 04/25/2022    TSH 0.686 01/31/2022     Vit D, 25-Hydroxy   Date Value Ref Range Status   07/22/2021 23 (L) 30 - 96 ng/mL Final     Comment:     Vitamin D deficiency.........<10 ng/mL                              Vitamin D insufficiency......10-29 ng/mL       Vitamin D sufficiency........> or equal to 30 ng/mL  Vitamin D toxicity............>100 ng/mL         Assessment and Plan     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   No, Will give referral today    PLAN:      Due to uncontrolled DM but with some improvement of A1C since last visit.  I will recommend the following.     -Increase Levemir to 18 units daily     -C/w Metformin 1g BID     -C/w Jardiance 25mg daily (Side effects of dehydration and euglycemic DKA discussed)     -If A1C remains above goal and weight is stable will consider restarting GLP-1 at that time.  Likely Trulicity     -Keep FS log to send for review in 2 week.     -Labs 1 week prior to next visit in 3 months      Hyperlipidemia, unspecified hyperlipidemia type  LDL at goal On Statin   Low Fat diet     Hypertension, essential  BP controlled on current BP meds   On ARB  for renal protection   Low Na diet

## 2022-05-02 ENCOUNTER — OFFICE VISIT (OUTPATIENT)
Dept: ENDOCRINOLOGY | Facility: CLINIC | Age: 62
End: 2022-05-02
Payer: COMMERCIAL

## 2022-05-02 VITALS
HEIGHT: 69 IN | SYSTOLIC BLOOD PRESSURE: 120 MMHG | DIASTOLIC BLOOD PRESSURE: 70 MMHG | WEIGHT: 167.88 LBS | BODY MASS INDEX: 24.87 KG/M2

## 2022-05-02 DIAGNOSIS — I10 HYPERTENSION, ESSENTIAL: ICD-10-CM

## 2022-05-02 DIAGNOSIS — E78.5 HYPERLIPIDEMIA, UNSPECIFIED HYPERLIPIDEMIA TYPE: ICD-10-CM

## 2022-05-02 PROCEDURE — 99214 PR OFFICE/OUTPT VISIT, EST, LEVL IV, 30-39 MIN: ICD-10-PCS | Mod: 25,S$GLB,, | Performed by: GENERAL ACUTE CARE HOSPITAL

## 2022-05-02 PROCEDURE — 95251 PR GLUCOSE MONITOR, 72 HOUR, PHYS INTERP: ICD-10-PCS | Mod: S$GLB,,, | Performed by: GENERAL ACUTE CARE HOSPITAL

## 2022-05-02 PROCEDURE — 99999 PR PBB SHADOW E&M-EST. PATIENT-LVL V: ICD-10-PCS | Mod: PBBFAC,,, | Performed by: GENERAL ACUTE CARE HOSPITAL

## 2022-05-02 PROCEDURE — 99999 PR PBB SHADOW E&M-EST. PATIENT-LVL V: CPT | Mod: PBBFAC,,, | Performed by: GENERAL ACUTE CARE HOSPITAL

## 2022-05-02 PROCEDURE — 95251 CONT GLUC MNTR ANALYSIS I&R: CPT | Mod: S$GLB,,, | Performed by: GENERAL ACUTE CARE HOSPITAL

## 2022-05-02 PROCEDURE — 99214 OFFICE O/P EST MOD 30 MIN: CPT | Mod: 25,S$GLB,, | Performed by: GENERAL ACUTE CARE HOSPITAL

## 2022-05-02 RX ORDER — IBUPROFEN 200 MG
16 TABLET ORAL
Qty: 50 TABLET | Refills: 12 | COMMUNITY
Start: 2022-05-02 | End: 2022-10-31

## 2022-05-02 NOTE — PATIENT INSTRUCTIONS
Due to your history of diabetes which seems to be improving which is good news.  I will recommend the following.     WE will increase the Levemir to 18 units once a day to continue to improve your diabetes.     Continue with Jardiance 25mg daily.     Continue with Metformin 1,000mg twice a day with food.      In around 2 week or so please send me a message so I can review your Freestyle sensor and make adjustments if needed.      Podiatry referral for nail fungus.     Blood work 1 week prior to next visit in 4 months.     If any questions feel free to contact me.     Have a nice day.     Sincerely,       Contreras Dunn MD   Endocrinology   5/2/2022 10:50 AM      Eating the Right Number of Calories (6650-8265 Guidelines)    Calories are a measure of the energy you get from food. If you eat more calories than you use, you will gain weight. If you eat fewer calories than you use, you will lose weight. Below are tables that give the number of calories needed each day. Look for your gender, age, and activity level. If you stick to this number, you should neither gain nor lose weight. Note that this is an estimated number of calories.* Your exact number may differ.    Women  Age in years Low activity level (calories/day) Moderate activity level (calories/day) High activity level (calories/day)   19 to 30 1,800-2,000 2,000-2,200 2,400   31 to 50 1,800 2,000 2,200   51 and older 1,600 1,800 2,000-2,200      Men  Age in years Low activity level  (calories/day) Moderate activity level (calories/day) High activity level (calories/day)   19 to 30 2,400-2,600 2,600-2,800 3,000   31 to 50 2,200-2,400 2,400-2,600 2,800-3,000   51 and older 2,000-2,200 2,200-2,400 2,400-2,800     Activity levels defined  Low. Only light physical activity such as that done during typical daily life.  Moderate. Light physical activity done during typical daily life AND physical activity equal to walking about 1.5 to 3 miles a day at 3 to 4 miles per  hour.  High. Light physical activity done during typical daily life AND physical activity equal to walking more than 3 miles a day at 3 to 4 miles per hour.  *From Dietary Guidelines for Americans, 5988-9676, [place] Department of Health and Human Services.    Eat less fat  A gram of fat has almost 2.5 times the calories of a gram of protein or carbohydrates. Try to balance your food choices so that only 20% to 35% of your calories comes from total fat. This means an average of 2½ to 3½ grams of fat for each 100 calories you eat.    Eat more fiber  High-fiber foods are digested more slowly than low-fiber foods, so you feel full longer. Try to get at least 25 grams of fiber each day for a 2000 calorie diet. Foods high in fiber include:  Vegetables and fruits  Whole-grain or bran breads, pastas, and cereals  Legumes (beans) and peas  As you begin to eat more fiber, be sure to drink plenty of water to keep your digestive system working smoothly.    Tips  Do's and don'ts include:   Dont skip meals. This often leads to overeating later on. Its best to spread your eating throughout the day.  Eat a variety of foods, not just a few favorites.  If you find yourself eating when youre not hungry, ask yourself why. Many of us eat when were bored, stressed, or just to be polite. Listen to your body. If youre not hungry, get busy doing something else instead of eating.  Eat slower, shooting for 20 to 30 minutes for each meal. It takes 20 minutes for your stomach to tell your brain that its full. Slow eaters tend to eat less and are still satisfied, while fast eaters may tend to be overeaters.   Pay attention to what you eat. Dont read or watch TV during your meal.     ---------------------------------------------------------------------------------------------------------------------------------------------------    Losing Weight for Heart Health  Excess weight is a major risk factor for heart disease. Losing weight has many  benefits including lowering your blood pressure, improving your cholesterol level, and decreasing your risk for diseases such as diabetes and heart disease. It may help keep your arteries open so that your heart can get the oxygen-rich blood it needs. All in all, losing weight makes you healthier.       Exercise with a friend. When activity is fun, you're more likely to stick with it.     Calories and weight loss  Calories are the fuel your body burns for energy. You get the calories you need from the food you eat. For healthy weight loss, women should eat at least 1,200 calories a day, men at least 1,500.  When you eat more calories than you need, your body stores the extra calories as fat. One pound of fat equals 3,500 calories.  To lose weight, try to reduce your total calorie intake by 500 calories. To do this, eat 250 calories less each day. Add activity to burn the other 250 calories. Walking 2.5 miles burns about 250 calories. Other more intense activities can burn more calories in the time you spend doing them, such as swimming and running. It is important to understand that reducing calorie intake is much more effective at weight loss than is exercise.  Eat a variety of healthy foods to get the nutrients you need.    Tips for losing weight  Drink 8 to 10 glasses of water a day.  Dont skip meals. Instead, eat smaller portions.  Eat your meals earlier in the day.  Cut out sugary drinks such as soda and fruit juices.  Make your later meals lighter than your earlier meals.     What can exercise help?  Blood sugar. Regular exercise improves blood sugar control by helping your body use insulin.  Mental and emotional health. Physical activity relieves stress and helps you sleep better.  Heart health. With regular exercise, you can reduce your risk of heart disease and high blood pressure. You can also improve your cholesterol and triglyceride levels.  Weight. Exercise helps you lose fat, gain muscle, and control  your weight.  Health of blood vessels and nerves. Activity helps lower blood sugar. This helps prevent damage to blood vessels and nerves that can cause problems with your brain, eyes, feet, and legs.  Finances. If you manage your blood sugar, you may spend less on medical care.    2 types of exercise  Two types of exercise help your body use blood sugar. Experts advise both types of exercise for people with diabetes:  Aerobic exercise. This is a rhythmic, repeated, continued movement of large muscle groups for at least 10 minutes at a time. You should do this about 30 minutes a day on most days of the week. Examples include walking, bicycling, jogging, swimming, water aerobics, and many sports.  Resistance exercise (strength training). This type of exercise uses muscles to move weight or work against resistance. You can do it with free weights, machines, resistance tubing, or your own body weight. Adults with diabetes should aim for 2 to 3 sessions of resistance exercise each week. Its best to skip a day in between.    A goal to shoot for  Your main goal is to become more active. Even a little bit helps. Choose an activity that you like. Walking is one great form of exercise that everyone can do. Talk to your healthcare provider about any limits you may have before starting with an exercise program. Then aim for 150 minutes a week of physical activity. Dont let more than 2 days go by without exercise. When you are sitting for long periods of time, get up for short sessions of light activity every 30 minutes.    Getting activity into your day  Being more active doesnt have to be hard work. Try these to get more activity into your day:  Take the stairs instead of the elevator  Garden, do housework, and yard work  Choose a parking space farther from the store  Walk to talk to a co-worker instead of calling  Take a 10-minute walk around the block at lunch  Walk to a bus stop a little farther from your home or  office  Walk the dog after dinner     ---------------------------------------------------------------------------------------------------------------------------------------------------    Reading Food Labels  Look for the Nutrition Facts label on packaged foods. Reading labels is a big step toward eating healthier. The tips below help you know what to look for.    Serving size. Read this closely because the package, jar, or can may contain more than 1 serving. This is how to measure 1 serving of the food in the package. If you eat more than 1 serving, you get more of everything on the label -- including fat, cholesterol, and calories.  Total fat. This tells you how many grams (g) of fat are in 1 serving. Fat is high in calories. A healthy goal is to have less than 25% of your daily calories come from fat.  Saturated fat. This tells you how much saturated fat is in 1 serving. Saturated fat raises your cholesterol the most. Look for foods that have little or no saturated fat.  Trans fat. This tells you how much trans fat is in 1 serving. Even a small amount of trans fat can harm your health. Choose foods that have no trans fat.  Cholesterol. This tells you how much cholesterol is in 1 serving. For many years, it had been recommended to eat less than 300 milligrams (mg) of cholesterol a day. New guidelines have removed this limitation as cholesterol has been recently shown to not raise blood cholesterol levels as significantly as previously thought. However, many foods high in cholesterol are also high in saturated fat. It is recommended to limit saturated fat in your diet.  Calories from fat. This number tells you how many calories from fat are in 1 serving (there are 9 calories per gram of fat). Look for foods with few calories from fat.  % Daily value. The higher the number, the more 1 serving has of that nutrient. Look for foods that have low numbers for total fat, saturated fat, cholesterol, and  sodium.  Sodium. This tells you how much sodium (salt) is in 1 serving. Choose foods with low numbers for sodium.  Dietary fiber. This number tells you how much fiber is in 1 serving. Foods that are high in fiber can help you feel full. They can also be good for your heart and digestion. The recommended daily amount of fiber is 25 grams for women and 38 grams for men. After age 50, your daily fiber needs drop to 21 grams for women and 30 grams for men.       ---------------------------------------------------------------------------------------------------------------------------------------------------    Understanding Carbohydrates, Fats, and Protein  Food is a source of fuel and nourishment for your body. Its also a source of pleasure. Having diabetes doesnt mean you have to eat special foods or give up desserts. Instead, your dietitian can show you how to plan meals to suit your body. To start, learn how different foods affect blood sugar.    Carbohydrates  Carbohydrates are the main source of fuel for the body. Carbohydrates raise blood sugar. Many people think carbohydrates are only found in pasta or bread. But carbohydrates are actually in many kinds of foods:  Sugars occur naturally in foods such as fruit, milk, honey, and molasses. Sugars can also be added to many foods, from cereals and yogurt to candy and desserts. Sugars raise blood sugar.  Starches are found in bread, cereals, pasta, and dried beans. Theyre also found in corn, peas, potatoes, yam, acorn squash, and butternut squash. Starches also raise blood sugar.   Fiber is found in foods such as vegetables, fruits, beans, and whole grains. Unlike other carbs, fiber isnt digested or absorbed. So it doesnt raise blood sugar. In fact, fiber can help keep blood sugar from rising too fast. It also helps keep blood cholesterol at a healthy level.  Did you know?  Even though carbohydrates raise blood sugar, its best to have some in every meal. They  are an important part of a healthy diet.     Fat  Fat is an energy source that can be stored until needed. Fat does not raise blood sugar. However, it can raise blood cholesterol, increasing the risk of heart disease. Fat is also high in calories, which can cause weight gain. Not all types of fat are the same.    More Healthy:  Monounsaturated fats are mostly found in vegetable oils, such as olive, canola, and peanut oils. They are also found in avocados and some nuts. Monounsaturated fats are healthy for your heart. Thats because they lower LDL (unhealthy) cholesterol.  Polyunsaturated fats are mostly found in vegetable oils, such as corn, safflower, and soybean oils. They are also found in some seeds, nuts, and fish. Polyunsaturated fats lower LDL (unhealthy) cholesterol. So, choosing them instead of saturated fats is healthy for your heart. Certain unsaturated fats can help lower triglycerides.     Less Healthy:  Saturated fats are found in animal products, such as meat, poultry, whole milk, lard, and butter. Saturated fats raise LDL cholesterol and are not healthy for your heart.  Hydrogenated oils and trans fats are formed when vegetable oils are processed into solid fats. They are found in many processed foods. Hydrogenated oils and trans fats raise LDL cholesterol and lower HDL (healthy) cholesterol. They are not healthy for your heart.    Protein  Protein helps the body build and repair muscle and other tissue. Protein has little or no effect on blood sugar. However, many foods that contain protein also contain saturated fat. By choosing low-fat protein sources, you can get the benefits of protein without the extra fat:  Plant protein is found in dry beans and peas, nuts, and soy products, such as tofu and soymilk. These sources tend to be cholesterol-free and low in saturated fat.  Animal protein is found in fish, poultry, meat, cheese, milk, and eggs. These contain cholesterol and can be high in saturated  fat. Aim for lean, lower-fat choices.    ---------------------------------------------------------------------------------------------------------------------------------------------------    Understanding Carbohydrates    A car needs the right type of fuel to run. And you need the right kind of food to function. To keep your energy level up, your body needs food that has carbohydrates. But carbohydrates raise blood sugar levels higher and faster than other kinds of food. Your dietitian will work with you to figure out the amount of carbohydrates you need.    Starches  Starches are found in grains, some vegetables, and beans. Grain products include bread, pasta, cereal, and tortillas. Starchy vegetables include potatoes, peas, corn, lima beans, yams, and squash. Kidney beans, hassan beans, black beans, garbanzo beans, and lentils also contain starches.    Sugars  Sugars are found naturally in many foods. Or sugar can be added. Foods that contain natural sugar include fruits and fruit juices, dairy products, honey, and molasses. Added sugars are found in most desserts, processed foods, candy, regular soda, and fruit drinks. These are very helpful for treating low blood sugar, or hypoglycemia. They provide sugar quickly. Try to keep at least 15 to 20 grams of these simple sugars with you at all times. Eat this if you begin to have low blood sugar symptoms.    Fiber  Fiber comes from plant foods. Most fiber isnt digested by the body. Instead of raising blood sugar levels like other carbohydrates, it actually stops blood sugar from rising too fast. Fiber is found in fruits, vegetables, whole grains, beans, peas, and many nuts.    Carb counting  Keep track of the amount of carbohydrates you eat. This can help you keep the right balance of physical activity and medicine. The amount of carbohydrates needed will vary for each person. It depends on many things such as your health, the medicines you take, and how active you  are. Your healthcare team will help you figure out the right amount of carbohydrates for you. You may start with around 45 to 60 grams of carbohydrate per meal, depending on your situation. Carb counting is a system that helps you keep track of the carbohydrates you eat at each meal.  Carbohydrates come from a variety of foods. These include grains, starchy vegetables, fruit, milk, beans, and snack foods. You can either count carbohydrate grams or carbohydrate servings. When you count carbohydrate servings, 1 carbohydrate serving = 15 grams of carbohydrates.  Here are some examples of foods containing about 15 grams of carbohydrates (1 serving of carbohydrates):  1/2 cup of canned or frozen fruit  A small piece of fresh fruit (4 ounces)  1 slice of bread  1/2 cup of oatmeal  1/3 cup of rice  4 to 6 crackers  1/2 English muffin  1/2 cup of black beans  1/4 of a large baked potato (3 ounces)  2/3 cup of plain fat-free yogurt  1 cup of soup  1/2 cup of casserole  6 chicken nuggets  2-inch-square brownie or cake without frosting  2 small cookies  1/2 cup of ice cream or sherbet    Carb counting is easier when food labels are available. Look at the label to see how many grams of total carbohydrates the food contains. Then you can figure out how much you should eat.  Two very important lines to look at on the label are the serving size and the total carbohydrate amount. Here are some tips for using food labels to count your carbohydrate intake:  Check the serving size. The information on the label is based on that serving size. If you eat more than the listed serving size, you may have to double or triple the other information on the label.   Check the total grams of carbohydrates. Total carbohydrate from the label includes sugar, starch, and fiber. Be sure to use the total carbohydrate number and not sugar alone.  Know how many grams of carbohydrates you can have.  Be familiar with the matching portion sizes.  Compare  labels. Compare the labels of different products, looking at serving sizes and total carbohydrates to find the products that work best for you.   Don't forget protein and fat. With all the focus on carb counting, it might be easy to forget protein and fat in your meals. Don't forget to include sources of protein and healthy fat to balance your meals.  Its also important to be consistent with the amount and time you eat when taking a fixed dose of diabetes medicine. Work with your healthcare provider or dietitian if you need additional help. He or she can help you keep track of your carbohydrate intake. He or she can also help you figure out how many grams of carbohydrates you should have.      ---------------------------------------------------------------------------------------------------------------------------------------------------    Diabetes: Learning About Serving and Portion Sizes     A good rule of thumb: Devote half your plate to vegetables and green salad. Split the other half between protein and starchy carbohydrates. Fruit makes a good dessert.     Servings and portions. Whats the difference? These terms can be very confusing. But learning to measure serving sizes can help you figure out how many carbohydrates (carbs) and other foods you eat each day. They are also powerful tools for managing your weight.    Servings and portions  Many different words are used to describe amounts of food. If your health care provider uses a term youre not sure of, dont be afraid to ask. It helps to know the difference between servings and portions:  A serving size is a fixed size. Food producers use this term to describe their products. For example, the label on a cereal box could say that 1 cup of dry cereal = 1 serving.  A portion (also called a helping) is how much you eat or how much you put on your plate at a meal. For example, you might eat 2 cups of cereal at breakfast.    Using serving  "information  The portion you choose to eat (such as 2 cups of cereal) may be more than one serving as listed on the food label (such as 1 cup of cereal). Thats why it helps to measure or weigh the food you eat. Because the food label values are based on servings, youll need to know how many servings you eat at one sitting.     Ounces: 2 to 3 ounces is about the size of your palm.       1 Cup: 1 cup (or a medium-sized piece) is about the size of your fist.       1/2 Cup: 1/2 cup is about the size of your cupped hand.      One tablespoon is about the size of your thumb.  One teaspoon is about the size of the tip of your thumb.    Keeping track of serving sizes  When youre planning for a snack or a meal, keep servings in mind. If you dont have measuring cups or a scale handy, there are ways to eyeball serving sizes, such as comparing your food to the size of your hand (see pictures above).    Managing portion sizes  If your weight is a concern, reducing your portions can help. You can eat more than one serving of a food at once. But to keep from eating too much at one meal, learn how to manage your portions. A portion is the amount of each type of food on your plate. See the plate diagram for an example of balanced portions.    ---------------------------------------------------------------------------------------------------------------------------------------------------    Diabetes: Shopping for and Preparing Meals    Having diabetes doesnt mean you have to shop in a special aisle or look for special foods. But you will need to make choices. By comparing items and reading food labels, you can find the healthiest foods for you and your family.  Comparing items  When you shop, compare items to find the best ones for your needs. Keep these facts in mind:  No sugar added does not mean a product is sugar-free.  "Sugar-free" means less than 1/2 gram (g) of sugar per serving.  Fat free means less than 1/2 g of " fat per serving. This does not necessarily mean the product is low in calories.  Low fat means 3 g fat or less per serving. Reduced fat or less fat means 25% less fat than the regular version. Some of this fat may be saturated or trans fat. And calories per serving may be similar to the regular version.    Making small changes  Dont try to change all of your eating habits at once. Here are some ideas to start with:  Try fat-free or low-fat cheese, milk, and yogurt. Also try leaner cuts of meat. This will help you cut down on saturated fat.  Try whole-grain breads, brown rice, and whole-wheat pasta.  Load up on fresh or frozen vegetables. If you buy canned, choose low-sodium vegetables.  Avoid processed foods as much as possible. They tend to be low in fiber and high in trans fats and sodium.  Try tofu, soymilk, or meat substitutes.?They can help you cut cholesterol and saturated fat out of your diet.    Learning to read food labels  To find healthy foods that help you control blood sugar, learn how to read food labels. Look for the Nutrition Facts label on packaged foods. It will tell you how much carbohydrate, sugar, fat, and fiber is in each serving. Then, you can decide whether or not the food fits into your meal plan.    Using the food label  So, once you have the food label, what do you do with it? The food label helps in many ways. Use it to:  Compare items and decide which is the best for your health needs.  Track the number of carbohydrates in your portions.  Figure out how many servings of a food you can have and still stay within the number of carbohydrates for that meal.    Planning meals  For good blood sugar control, plan what and when youll eat. Start by creating a meal plan that includes all the food groups. Then, time your meals to help keep your blood sugar level steady. You may need to adjust your plan for special situations.    Eat from all the food groups  The basis of a healthy meal plan  is variety (eating many different types of foods). Look for lean meats, fresh fruits and vegetables, whole grains, and low-fat or non-fat dairy products. Eating a wide variety of foods provides the nutrients your body needs. It can also keep you from getting bored with your meal plan.    Reduce liquid sugars  Extra calories from sodas, sports drinks, and fruit drinks make it hard to keep blood sugar in range. Cut as many liquid sugars from your meal plan as you can. This includes most fruit juices, which are often high in natural or added sugar. Instead, drink plenty of water and other sugar-free beverages.    Eat less fat  If you need to lose some weight, try to reduce the amount of fat in your diet. This can also help lower your cholesterol level to keep blood vessels healthier. Cut fat by using only small amounts of liquid oil for cooking. Read food labels carefully to avoid foods with unhealthy trans fats.    Timing your meals  When it comes to blood sugar control, when you eat is as important as what you eat. You may need to eat several small meals spaced evenly throughout the day to stay in your target range. So dont skip breakfast or wait until late in the day to get most of your calories. Doing so can cause your blood sugar to rise too high or fall too low.    Cooking wisely  Broil, steam, bake, or grill meats and vegetables, instead of frying.  Instead of cream-based sauces or sugary glazes, flavor foods with vegetable purée, lemon or lime juice, or herb seasonings.  Remove skin from chicken and turkey before serving.  Look in cookbooks for easy, low-fat, low-sugar recipes. When making your usual recipes, cut sugar by 1/2 and fat by 1/3.      ---------------------------------------------------------------------------------------------------------------------------------------------------    Healthy Meals for Diabetes    Ask your healthcare team to help you make a meal plan that fits your needs. Your meal  plan tells you when to eat your meals and snacks, what kinds of foods to eat, and how much of each food to eat. You dont have to give up all the foods you like. But you do need to follow some guidelines.  Choose healthy carbohydrates  Starches, sugars, and fiber are all types of carbohydrates. Fiber can help lower your cholesterol and triglycerides. Fiber is also healthy for your heart. You should have 20 to 35 grams of total fiber each day. Fiber-rich foods include:  Whole-grain breads and cereals  Bulgur wheat  Brown rice     Whole-wheat pasta  Fruits and vegetables  Dry beans, and peas   Keep track of the amount of carbohydrates you eat. This can help you keep the right balance of physical activity and medicine. The amount of carbohydrates needed will vary for each person. It depends on many things such as your health, the medicines you take, and how active you are. Your healthcare team will help you figure out the right amount of carbohydrates for you. You may start with around 45 to 60 grams of carbohydrates per meal, depending on your situation.   Here are some examples of foods containing about 15 grams of carbohydrates (1 serving of carbohydrates):  1/2 cup of canned or frozen fruit  A small piece of fresh fruit (4 ounces)  1 slice of bread  1/2 cup of oatmeal  1/3 cup of rice  4 to 6 crackers  1/2 English muffin  1/2 cup of black beans  1/4 of a large baked potato (3 ounces)  2/3 cup of plain fat-free yogurt  1 cup of soup  1/2 cup of casserole  6 chicken nuggets  2-inch-square brownie or cake without frosting  2 small cookies  1/2 cup of ice cream or sherbet    Choose healthy protein foods  Eating protein that is low in fat can help you control your weight. It also helps keep your heart healthy. Low-fat protein foods include:  Fish  Plant proteins, such as dry beans and peas, nuts, and soy products like tofu and soymilk  Lean meat with all visible fat removed  Poultry with the skin removed  Low-fat or  nonfat milk, cheese, and yogurt    Limit unhealthy fats and sugar  Saturated and trans fats are unhealthy for your heart. They raise LDL (bad) cholesterol. Fat is also high in calories, so it can make you gain weight. To cut down on unhealthy fats and sugar, limit these foods:  Butter or margarine  Palm and palm kernel oils and coconut oil  Cream  Cheese  Granado  Lunch meats     Ice cream  Sweet bakery goods such as pies, muffins, and donuts  Jams and jellies  Candy bars  Regular sodas     How much to eat  The amount of food you eat affects your blood sugar. It also affects your weight. Your healthcare team will tell you how much of each type of food you should eat.  Use measuring cups and spoons and a food scale to measure serving sizes.  Learn what a correct serving size looks like on your plate. This will help when you are away from home and cant measure your servings.  Eat only the number of servings given on your meal plan for each food. Dont take seconds.  Learn to read food labels. Be sure to look at serving size, total carbohydrates, fiber, calories, sugar, and saturated and trans fats. Look for healthier alternatives to foods that have added sugar.  Plan ahead for parties so you can still have a good time without going overboard with unhealthy food choices. Set a good example yourself by bringing a healthy dish to pot lucks.     Choose healthy snacks  When it comes to snacks, we usually think about foods with added sugar and fats. But there are many other options for healthier snack choices. Here are a few snack ideas to choose from:  Snacks with less than 5 grams of carbohydrates  1 piece of string cheese  3 celery sticks plus 1 tablespoon of peanut butter  5 cherry tomatoes plus 1 tablespoon of ranch dressing  1 hard-boiled egg  1/4 cup of fresh blueberries   5 baby carrots  1 cup of light popcorn  1/2 cup of sugar-free gelatin  15 almonds  Snacks with about 10 to 20 grams of carbohydrates  1/3 cup of  hummus plus 1 cup of fresh cut nonstarchy vegetables (carrots, green peppers, broccoli, celery, or a combination)  1/2 cup of fresh or canned fruit plus 1/4 cup of cottage cheese  1/2 cup of tuna salad with 4 crackers  2 rice cakes and a tablespoon of peanut butter  1 small apple or orange  3 cups light popcorn  1/2 of a turkey sandwich (1 slice of whole-wheat bread, 2 ounces of turkey, and mustard)  Portion sizes are important to controlling your blood sugar and staying at a healthy weight. Stock up on healthy snack items so you always have them on hand.    When to eat  Your meal plan will likely include breakfast, lunch, dinner, and some snacks.  Try to eat your meals and snacks at about the same times each day.  Eat all your meals and snacks. Skipping a meal or snack can make your blood sugar drop too low. It can also cause you to eat too much at the next meal or snack. Then your blood sugar could get too high.    ---------------------------------------------------------------------------------------------------------------------------------------------------    Eating Out When You Have Diabetes  Eating right is an important part of keeping your blood sugar in your target range. You just need to make healthy choices.    Be creative when eating out. Many places dont make you stick strictly to the menu. Instead of ordering a large entree, choose an appetizer or two and a bowl of soup. A mix of side orders can also make a good meal. Read the descriptions of other entrees and specials. If another entree comes with baby carrots, ask for a side order of them even if they dont come with your entree. Ask how food is prepared or if it can be made differently.  Tips for making the most of your meal out  Ask to have high-fat, high-calorie extras, such as French fries and potato chips, left off your plate so you wont be tempted.   Ask what substitutions are available. Instead of French fries, you may be able to have a  side of salad with low-calorie dressing.  Order low-fat milk instead of cream for your coffee.  Ask for vegetables and main courses to be served without sauces, butter, margarine, or oil.  Be aware of portion size. You dont have to clean your plate. Take half your meal home in a doggie bag to eat the next day.  Look for heart-healthy or low-fat entrees that include whole grains, vegetables, or fruits.  Choose foods that are grilled, broiled, or steamed.  Avoid dishes that are described on the menu as fried, breaded, smothered, rich, or creamy.    Tips for restaurant meals  When you eat away from home try these tips:  Try to schedule your dining-out meal at your normal meal time. Make a reservation if possible, so you don't have to wait to eat. If you can't make a reservation, try to arrive at the restaurant at a less-busy time to cut down your wait time. Eat a small fruit or starch snack at your regular mealtime if your restaurant meal is going to be later than usual.   Call ahead to see if the restaurant can meet your dietary needs if you've never been there before. Or you can go online to see the menu ahead of time.  Carry some crackers with you in case the restaurant needs you to wait until you can be served.  Ask how foods are prepared before you order.  Instead of fried, sautéed, or breaded foods, choose ones that are broiled, steamed, grilled, or baked.  Ask for sauces, gravies, and dressings on the side.  Only eat an amount that fits your meal plan. Remember: You can take home the leftovers.  Save dessert for special occasions. Then choose a small dessert or share one with a friend or family member.    Make healthy choices  Fast food  Garden salad with light dressing on the side  Baked potato with vegetables or herbs  Broiled, roasted, or grilled chicken sandwich  Sliced turkey or lean roast beef sandwich    Mexican  Chicken enchilada, without cheese or sour cream   Small burrito with whole beans and  chicken  Whole beans (not refried) and rice  Chicken or fish fajitas    Steakhouse  Grilled or broiled lean cuts of beef  Baked potato with vegetables or herbs  Broiled or baked chicken. Dont eat the skin.  Steamed vegetables    Asian  Steamed dumplings or potstickers  Broiled, boiled, or steamed meats or fish  Sushi or sashimi  Steamed rice or boiled noodles. One serving is equal to 1/3 cup.      © 0405-0214 [employer]. [address] [place] [address], [place] [address]. All rights reserved. This information is not intended as a substitute for professional medical care. Always follow your healthcare professional's instructions.

## 2022-05-10 ENCOUNTER — TELEPHONE (OUTPATIENT)
Dept: ENDOCRINOLOGY | Facility: CLINIC | Age: 62
End: 2022-05-10
Payer: COMMERCIAL

## 2022-05-10 RX ORDER — INSULIN DETEMIR 100 [IU]/ML
18 INJECTION, SOLUTION SUBCUTANEOUS NIGHTLY
Qty: 9 ML | Refills: 6 | Status: SHIPPED | OUTPATIENT
Start: 2022-05-10 | End: 2023-07-17 | Stop reason: SDUPTHER

## 2022-05-10 NOTE — TELEPHONE ENCOUNTER
----- Message from Aga Pizano sent at 5/10/2022 12:31 PM CDT -----  Contact: Cristy bryan  Pt's fiance requesting call back re: caller states provider was supposed to be calling in a new rx for med below and increasing the dosage from 15 units to 18 units.     insulin detemir U-100 (LEVEMIR FLEXTOUCH U-100 INSULN) 100 unit/mL (3 mL) InPn pen    Confirmed contact below:  Contact Name: Cristy  Phone Number: [phone]    [id number] - [place] - [address] [facility], [address]  [address] [facility] [facility], [address]  [place] [address]  Phone: [phone] Fax: [phone]

## 2022-05-18 ENCOUNTER — OFFICE VISIT (OUTPATIENT)
Dept: CARDIOLOGY | Facility: CLINIC | Age: 62
End: 2022-05-18
Payer: COMMERCIAL

## 2022-05-18 VITALS
OXYGEN SATURATION: 98 % | SYSTOLIC BLOOD PRESSURE: 137 MMHG | BODY MASS INDEX: 25.21 KG/M2 | HEIGHT: 69 IN | WEIGHT: 170.19 LBS | HEART RATE: 69 BPM | DIASTOLIC BLOOD PRESSURE: 74 MMHG

## 2022-05-18 DIAGNOSIS — I25.10 CORONARY ARTERY DISEASE INVOLVING NATIVE CORONARY ARTERY OF NATIVE HEART WITHOUT ANGINA PECTORIS: Primary | ICD-10-CM

## 2022-05-18 DIAGNOSIS — I10 ESSENTIAL HYPERTENSION: ICD-10-CM

## 2022-05-18 DIAGNOSIS — E11.69 HYPERLIPIDEMIA ASSOCIATED WITH TYPE 2 DIABETES MELLITUS: ICD-10-CM

## 2022-05-18 DIAGNOSIS — E78.5 HYPERLIPIDEMIA ASSOCIATED WITH TYPE 2 DIABETES MELLITUS: ICD-10-CM

## 2022-05-18 PROCEDURE — 99999 PR PBB SHADOW E&M-EST. PATIENT-LVL V: ICD-10-PCS | Mod: PBBFAC,,, | Performed by: INTERNAL MEDICINE

## 2022-05-18 PROCEDURE — 99213 OFFICE O/P EST LOW 20 MIN: CPT | Mod: S$GLB,,, | Performed by: INTERNAL MEDICINE

## 2022-05-18 PROCEDURE — 99213 PR OFFICE/OUTPT VISIT, EST, LEVL III, 20-29 MIN: ICD-10-PCS | Mod: S$GLB,,, | Performed by: INTERNAL MEDICINE

## 2022-05-18 PROCEDURE — 99999 PR PBB SHADOW E&M-EST. PATIENT-LVL V: CPT | Mod: PBBFAC,,, | Performed by: INTERNAL MEDICINE

## 2022-05-18 NOTE — PROGRESS NOTES
PCP - Elissa Navarro DO  Referring Physician:     Subjective:   Patient ID:  Darrell Hinds Jr. is a 61 y.o.  with a history of hypertension, hyperlipidemia, and type 2 diabetes mellitus, CAD.  Patient had been having unexplained weight loss of approximately 20 lb over the past 1-2 years.  His workup included at visit with Gastroenterology as well as a CT chest abdomen pelvis.  Significant coronary calcifications was noted on a CT chest in given his risk factors, stress echo was ordered which was found to be positive noting 1.5 mm downsloping ST depression in the anterior leads during recovery, as well as hypokinesis in the mid anterior septum during exercise.      Coronary angiography+ PCI on 10/5/2021    · The Mid LAD-1 lesion was 75% stenosed with 0% stenosis post-intervention. The Mid LAD-2 lesion was 90% stenosed with 0% stenosis post-intervention.  · A STENT RESOLUTE DOUGLAS [id number] drug eluting stent was successfully placed. Post dilated with 3.5 NC balloon  · IVUS was used for LAD sent sizing  · The 3rd RPL lesion was 100% stenosed, small caliber with collaterals from LAD  · The Dist Cx lesion was 70% stenosed. The 1st Mrg lesion was 60% stenosed.  · The filling pressures on the left were normal.  · The pre-procedure left ventricular end diastolic pressure was 5.  · The estimated blood loss was <50 mL.    Interval history  Denies angina   No shortness of breath  Doing well a      History:     Social History     Tobacco Use    Smoking status: Never Smoker    Smokeless tobacco: Never Used   Substance Use Topics    Alcohol use: Not Currently     Alcohol/week: 1.0 standard drink     Types: 1 Cans of beer per week     Comment: seldom..maybe once a month     Family History   Problem Relation Age of Onset    Hypertension Mother     Cataracts Mother     Heart disease Father     Diabetes Father     Cataracts Father     Stroke Father     Heart failure Father     Hyperlipidemia Father     Diabetes  "Brother     Hypertension Brother     Cancer Daughter         leukemia at 13yo    Kidney disease Maternal Grandmother     Heart disease Paternal Grandmother     Heart disease Paternal Grandfather     No Known Problems Maternal Grandfather     No Known Problems Sister     No Known Problems Maternal Aunt     No Known Problems Maternal Uncle     No Known Problems Paternal Aunt     No Known Problems Paternal Uncle     Blindness Neg Hx     Glaucoma Neg Hx     Macular degeneration Neg Hx     Retinal detachment Neg Hx     Acne Neg Hx     Eczema Neg Hx     Lupus Neg Hx     Psoriasis Neg Hx     Melanoma Neg Hx     Anesthesia problems Neg Hx     Amblyopia Neg Hx     Strabismus Neg Hx     Thyroid disease Neg Hx     Heart attack Neg Hx        Meds:   Review of patient's allergies indicates:  No Known Allergies    Current Outpatient Medications:     aspirin (ECOTRIN) 81 MG EC tablet, Take 81 mg by mouth once daily., Disp: , Rfl:     avanafiL (STENDRA) 200 mg Tab, Take 200 mg by mouth as needed (prn ED)., Disp: 8 tablet, Rfl: 11    BD ULTRA-FINE NAT PEN NEEDLE 32 gauge x 5/32" Ndle, ONCE A DAY, Disp: 100 each, Rfl: 3    clopidogreL (PLAVIX) 75 mg tablet, Take 1 tablet (75 mg total) by mouth once daily., Disp: 90 tablet, Rfl: 2    ezetimibe (ZETIA) 10 mg tablet, Take 1 tablet (10 mg total) by mouth once daily., Disp: 30 tablet, Rfl: 6    famotidine (PEPCID) 20 MG tablet, Take 1 tablet (20 mg total) by mouth 2 (two) times daily., Disp: 20 tablet, Rfl: 0    glucose 4 GM chewable tablet, Take 4 tablets (16 g total) by mouth as needed for Low blood sugar (If having symptoms of blurry vision, palpitations, confusion, shakiness.  Please check sugars and if sugar below 70 please take 4 tablets and re-check sugar everry 15 minutes until sugars are above 70 and symptoms resolve.)., Disp: 50 tablet, Rfl: 12    glucose 4 GM chewable tablet, Take 4 tablets (16 g total) by mouth as needed for Low blood sugar " (If having symptoms of blurry vision, palpitations, confusion, shakiness.  Please check sugars and if sugar below 70 please take 4 tablets and re-check sugar everry 15 minutes until sugars are above 70 and symptoms resolve.)., Disp: 50 tablet, Rfl: 12    insulin detemir U-100 (LEVEMIR FLEXTOUCH U-100 INSULN) 100 unit/mL (3 mL) InPn pen, Inject 18 Units into the skin every evening., Disp: 9 mL, Rfl: 6    JARDIANCE 25 mg tablet, TAKE 1 TABLET (25 MG TOTAL) BY MOUTH ONCE DAILY., Disp: 30 tablet, Rfl: 11    losartan (COZAAR) 25 MG tablet, Take 1 tablet (25 mg total) by mouth once daily., Disp: 90 tablet, Rfl: 3    metFORMIN (GLUCOPHAGE) 1000 MG tablet, Take 1 tablet (1,000 mg total) by mouth 2 (two) times daily., Disp: 180 tablet, Rfl: 1    metoprolol succinate (TOPROL-XL) 25 MG 24 hr tablet, TAKE ONE TABLET BY MOUTH EVERY DAY, Disp: 30 tablet, Rfl: 6    pantoprazole (PROTONIX) 40 MG tablet, Take 1 tablet (40 mg total) by mouth once daily., Disp: 30 tablet, Rfl: 5    pulse oximeter (PULSE OXIMETER) device, by Apply Externally route 2 (two) times a day. Use twice daily at 8 AM and 3 PM and [facility]., Disp: 1 each, Rfl: 0    semaglutide (RYBELSUS) 7 mg tablet, Take 1 tablet (7 mg total) by mouth once daily., Disp: 30 tablet, Rfl: 3    sildenafil (REVATIO) 20 mg Tab, Take 3 tablets (60 mg total) by mouth as needed (ED)., Disp: 45 tablet, Rfl: 5    vardenafiL (LEVITRA) 20 MG tablet, Take 1 tablet (20 mg total) by mouth daily as needed for Erectile Dysfunction., Disp: 8 tablet, Rfl: 11    vardenafiL (STAXYN) 10 mg TbDL, Take 10 mg by mouth once daily., Disp: 8 tablet, Rfl: 11    albuterol (PROVENTIL/VENTOLIN HFA) 90 mcg/actuation inhaler, Inhale 1-2 puffs into the lungs every 6 (six) hours as needed for Wheezing or Shortness of Breath. Rescue (Patient not taking: No sig reported), Disp: 6.7 g, Rfl: 0    diclofenac sodium (VOLTAREN) 1 % Gel, Apply 2 g topically 3 (three) times  "daily. (Patient not taking: No sig reported), Disp: 1 Tube, Rfl: 3    evolocumab (REPATHA SURECLICK) 140 mg/mL PnIj, Inject 1 mL (140 mg total) into the skin every 14 (fourteen) days. (Patient not taking: No sig reported), Disp: 140 mL, Rfl: 6    Review of Systems   Constitutional: Negative for fever.   Eyes: Negative for blurred vision and double vision.   Respiratory: Negative for shortness of breath.    Cardiovascular: Negative for chest pain, palpitations, orthopnea, claudication, leg swelling and PND.   Gastrointestinal: Negative for abdominal pain, nausea and vomiting.   Neurological: Negative for loss of consciousness and weakness.       Objective:   /74 (BP Location: Left arm, Patient Position: Sitting, BP Method: Large (Automatic))   Pulse 69   Ht 5' 9" (1.753 m)   Wt 77.2 kg (170 lb 3.1 oz)   SpO2 98%   BMI 25.13 kg/m²   Physical Exam  Constitutional:       General: He is not in acute distress.     Appearance: He is not diaphoretic.   HENT:      Head: Normocephalic and atraumatic.      Right Ear: External ear normal.      Left Ear: External ear normal.   Neck:      Thyroid: No thyromegaly.      Trachea: No tracheal deviation.   Cardiovascular:      Rate and Rhythm: Normal rate.      Pulses:           Carotid pulses are 2+ on the right side and 2+ on the left side.       Radial pulses are 2+ on the right side and 2+ on the left side.        Femoral pulses are 2+ on the right side and 2+ on the left side.       Popliteal pulses are 2+ on the right side and 2+ on the left side.        Dorsalis pedis pulses are 2+ on the right side and 2+ on the left side.        Posterior tibial pulses are 2+ on the right side and 2+ on the left side.      Heart sounds: Normal heart sounds. No murmur heard.    No friction rub. No gallop.   Pulmonary:      Effort: No respiratory distress.      Breath sounds: No wheezing.   Abdominal:      General: There is no distension.      Tenderness: There is no abdominal " tenderness. There is no rebound.   Musculoskeletal:         General: No tenderness or deformity. Normal range of motion.   Lymphadenopathy:      Cervical: No cervical adenopathy.   Skin:     Findings: No erythema or rash.   Neurological:      Mental Status: He is alert and oriented to person, place, and time.         Labs:     Lab Results   Component Value Date     04/25/2022    K 5.6 (H) 04/25/2022     04/25/2022    CO2 24 04/25/2022    BUN 17 04/25/2022    CREATININE 0.75 04/25/2022    ANIONGAP 13 04/25/2022     Lab Results   Component Value Date    HGBA1C 9.1 (H) 04/25/2022     No results found for: BNP, BNPTRIAGEBLO    Lab Results   Component Value Date    WBC 7.76 01/10/2022    HGB 13.5 (L) 01/10/2022    HCT 39.2 (L) 01/10/2022     01/10/2022    GRAN 6.0 01/10/2022    GRAN 77.8 (H) 01/10/2022     Lab Results   Component Value Date    CHOL 115 (L) 02/22/2022    HDL 47 02/22/2022    LDLCALC 50.4 (L) 02/22/2022    TRIG 88 02/22/2022       Lab Results   Component Value Date     04/25/2022    K 5.6 (H) 04/25/2022     04/25/2022    CO2 24 04/25/2022    BUN 17 04/25/2022    CREATININE 0.75 04/25/2022    ANIONGAP 13 04/25/2022     Lab Results   Component Value Date    HGBA1C 9.1 (H) 04/25/2022     No results found for: BNP, BNPTRIAGEBLO Lab Results   Component Value Date    WBC 7.76 01/10/2022    HGB 13.5 (L) 01/10/2022    HCT 39.2 (L) 01/10/2022     01/10/2022    GRAN 6.0 01/10/2022    GRAN 77.8 (H) 01/10/2022     Lab Results   Component Value Date    CHOL 115 (L) 02/22/2022    HDL 47 02/22/2022    LDLCALC 50.4 (L) 02/22/2022    TRIG 88 02/22/2022                Cardiovascular Imaging:     Stress Echo 9/15/21l:  · The patient exercised for 7 minutes and 1 seconds on a high ramp protocol, corresponding to a functional capacity of 11 METS, achieving a peak heart rate of 153 bpm, which is 97% of the age predicted maximum heart rate.  · The patient's exercise capacity was  average.  · The test was stopped because the patient experienced fatigue.  · There were no arrhythmias during stress.  · The ECG portion of this study is positive for myocardial ischemia. There was 1.5 mm downsloping ST depression in the anterior leads during recovery.  · The left ventricle is normal in size with normal systolic function.  · The estimated ejection fraction is 60%.  · Normal left ventricular diastolic function.  · Normal right ventricular size with normal right ventricular systolic function.  · Normal central venous pressure (3 mmHg).  · The estimated PA systolic pressure is 21 mmHg.  · The stress echo portion of this study is positive for myocardial ischemia: mid anterior septum becomes hypokinetic following exercicse.      Assessment & Plan:     CAD, Stable angina   S/P LAD PCI   Cont aspirin, plavix   Cont metoprolol   Continue Zetia     Hypertension  -continue current medication regimen    Hyperlipidemia  -continue same treatment     Type 2 diabetes mellitus  -diet and exercise.    Follow-up with PCP.        Signed:  Lawson Faulkner MD   Interventional Cardiology  Vascular Intervention   [facility] [address], [place] [address]

## 2022-05-20 ENCOUNTER — TELEPHONE (OUTPATIENT)
Dept: CARDIOLOGY | Facility: CLINIC | Age: 62
End: 2022-05-20
Payer: COMMERCIAL

## 2022-05-20 DIAGNOSIS — E78.5 HYPERLIPIDEMIA ASSOCIATED WITH TYPE 2 DIABETES MELLITUS: Primary | ICD-10-CM

## 2022-05-20 DIAGNOSIS — E11.69 HYPERLIPIDEMIA ASSOCIATED WITH TYPE 2 DIABETES MELLITUS: Primary | ICD-10-CM

## 2022-05-20 RX ORDER — EVOLOCUMAB 140 MG/ML
140 INJECTION, SOLUTION SUBCUTANEOUS
Qty: 1 ML | Refills: 6 | Status: SHIPPED | OUTPATIENT
Start: 2022-05-20 | End: 2022-12-06 | Stop reason: SDUPTHER

## 2022-05-20 RX ORDER — EVOLOCUMAB 140 MG/ML
140 INJECTION, SOLUTION SUBCUTANEOUS
COMMUNITY
End: 2022-05-20 | Stop reason: SDUPTHER

## 2022-05-20 NOTE — TELEPHONE ENCOUNTER
----- Message from Vanessa Oropeza MD sent at 5/20/2022 10:07 AM CDT -----  Please inform the patient that unfortunately off Repatha his LDL-C is higher again. It means that Zetia is not enough. I would restart Repatha 1 x month rather than 2 x month and repat blood work in 2 months after two doses of Repatha.

## 2022-05-25 ENCOUNTER — OFFICE VISIT (OUTPATIENT)
Dept: PODIATRY | Facility: CLINIC | Age: 62
End: 2022-05-25
Payer: COMMERCIAL

## 2022-05-25 VITALS
BODY MASS INDEX: 25.13 KG/M2 | HEIGHT: 69 IN | SYSTOLIC BLOOD PRESSURE: 151 MMHG | DIASTOLIC BLOOD PRESSURE: 84 MMHG | HEART RATE: 68 BPM

## 2022-05-25 DIAGNOSIS — L60.9 DISEASE OF NAIL: ICD-10-CM

## 2022-05-25 DIAGNOSIS — E11.9 ENCOUNTER FOR DIABETIC FOOT EXAM: ICD-10-CM

## 2022-05-25 DIAGNOSIS — E08.42 DIABETIC POLYNEUROPATHY ASSOCIATED WITH DIABETES MELLITUS DUE TO UNDERLYING CONDITION: ICD-10-CM

## 2022-05-25 PROCEDURE — 99999 PR PBB SHADOW E&M-EST. PATIENT-LVL III: CPT | Mod: PBBFAC,,, | Performed by: STUDENT IN AN ORGANIZED HEALTH CARE EDUCATION/TRAINING PROGRAM

## 2022-05-25 PROCEDURE — 99203 PR OFFICE/OUTPT VISIT, NEW, LEVL III, 30-44 MIN: ICD-10-PCS | Mod: S$GLB,,, | Performed by: STUDENT IN AN ORGANIZED HEALTH CARE EDUCATION/TRAINING PROGRAM

## 2022-05-25 PROCEDURE — 99999 PR PBB SHADOW E&M-EST. PATIENT-LVL III: ICD-10-PCS | Mod: PBBFAC,,, | Performed by: STUDENT IN AN ORGANIZED HEALTH CARE EDUCATION/TRAINING PROGRAM

## 2022-05-25 PROCEDURE — 99203 OFFICE O/P NEW LOW 30 MIN: CPT | Mod: S$GLB,,, | Performed by: STUDENT IN AN ORGANIZED HEALTH CARE EDUCATION/TRAINING PROGRAM

## 2022-05-25 RX ORDER — CICLOPIROX 80 MG/ML
SOLUTION TOPICAL NIGHTLY
Qty: 6.6 ML | Refills: 11 | Status: SHIPPED | OUTPATIENT
Start: 2022-05-25 | End: 2023-07-17

## 2022-05-25 NOTE — PROGRESS NOTES
Subjective:      Patient ID: Darrell Hinds Jr. is a 61 y.o. male.    Chief Complaint: Diabetes Mellitus (PCP Dr. Navarro/Teddy 1/31/22), Diabetic Foot Exam, Routine Foot Care, and Nail Problem (Toenail fungus)    Darrell is a 61 y.o. male who presents to the clinic upon referral from Dr. Eden  for evaluation and treatment of diabetic feet. Darrell has a past medical history of Cataract, Diabetes mellitus, type 2, Diabetic retinopathy, Encounter for cosmetic surgery, GERD (gastroesophageal reflux disease), Hyperlipidemia, Hypertension, Hypertrophy of nasal turbinates, Kidney stone (2009), Nasal obstruction, Nasal septal deformity, and Unspecified disorder of kidney and ureter. Patient relates no major problem with feet. Only complaints today consist of thickened toenails. States has tried Lamisil in the past which cleared up his fungus. No other pedal complaints.     PCP: Elissa Navarro,     Date Last Seen by PCP: 1/31/22    Current shoe gear: Tennis shoes    Hemoglobin A1C   Date Value Ref Range Status   04/25/2022 9.1 (H) 4.0 - 5.6 % Final     Comment:     ADA Screening Guidelines:  5.7-6.4%  Consistent with prediabetes  >or=6.5%  Consistent with diabetes    High levels of fetal hemoglobin interfere with the HbA1C  assay. Heterozygous hemoglobin variants (HbS, HgC, etc)do  not significantly interfere with this assay.   However, presence of multiple variants may affect accuracy.     01/31/2022 10.7 (H) 4.0 - 5.6 % Final     Comment:     ADA Screening Guidelines:  5.7-6.4%  Consistent with prediabetes  >or=6.5%  Consistent with diabetes    High levels of fetal hemoglobin interfere with the HbA1C  assay. Heterozygous hemoglobin variants (HbS, HgC, etc)do  not significantly interfere with this assay.   However, presence of multiple variants may affect accuracy.     07/22/2021 10.3 (H) 4.0 - 5.6 % Final     Comment:     ADA Screening Guidelines:  5.7-6.4%  Consistent with prediabetes  >or=6.5%   Consistent with diabetes    High levels of fetal hemoglobin interfere with the HbA1C  assay. Heterozygous hemoglobin variants (HbS, HgC, etc)do  not significantly interfere with this assay.   However, presence of multiple variants may affect accuracy.             Review of Systems   Constitutional: Negative for chills, decreased appetite, diaphoresis and fever.   HENT: Negative for congestion and hearing loss.    Cardiovascular: Negative for chest pain, claudication, leg swelling and syncope.   Respiratory: Negative for cough and shortness of breath.    Skin: Positive for dry skin and nail changes. Negative for color change, flushing, itching, poor wound healing and rash.   Musculoskeletal: Negative for arthritis, back pain, joint pain and joint swelling.   Gastrointestinal: Negative for nausea and vomiting.   Neurological: Positive for numbness. Negative for focal weakness, paresthesias and weakness.   Psychiatric/Behavioral: Negative for altered mental status. The patient is not nervous/anxious.            Objective:      Physical Exam  Constitutional:       General: He is not in acute distress.     Appearance: Normal appearance. He is well-developed. He is not diaphoretic.   Cardiovascular:      Pulses:           Dorsalis pedis pulses are 2+ on the right side and 2+ on the left side.        Posterior tibial pulses are 2+ on the right side and 2+ on the left side.      Comments: Dorsalis pedis and posterior tibial pulses are within normal limits. Skin temperature is within normal limits. Toes are cool to touch and feet are warm proximally. Hair growth is within normal limits. Skin is normotrophic and without hyperpigmentation. No edema noted. No varicosities or spider veins noted, bilaterally.     Musculoskeletal:         General: No tenderness.      Comments: Adequate joint range of motion without pain, limitation, nor crepitation to foot and ankle joints. Muscle strength is 5/5 in all groups bilaterally.        Feet:      Right foot:      Protective Sensation: 10 sites tested. 10 sites sensed.      Skin integrity: Dry skin present. No ulcer, blister, erythema or warmth.      Left foot:      Protective Sensation: 10 sites tested. 10 sites sensed.      Skin integrity: Dry skin present. No ulcer, blister, erythema or warmth.   Skin:     General: Skin is warm and dry.      Capillary Refill: Capillary refill takes less than 2 seconds.      Comments: Skin is warm and dry, no acute signs of infection noted bilaterally      Toenails are thickened by 2-4 mm's, dystrophic, and are darkened in coloration with subungual fungal debris.     Otherwise, no open wounds, macerations or hyperkeratotic lesions, bilaterally            Neurological:      Mental Status: He is alert and oriented to person, place, and time.      Sensory: Sensory deficit present.      Motor: No abnormal muscle tone.      Comments: Light touch within normal limits. Vibratory sensation is mildy diminished bilaterally.   Psychiatric:         Mood and Affect: Mood normal.         Behavior: Behavior normal.         Thought Content: Thought content normal.               Assessment:       Encounter Diagnoses   Name Primary?   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      I counseled the patient on his conditions, their implications and medical management.    Discussed importance of keeping feet dry and changing socks and shoes on a regular basis to prevent spread of toenail fungus. Discussed treatment options for fungal toenails including topical home remedies, topical OTC and Rx medications as well as oral Rx medications and laser treatment. All pros and cons discussed of each treatment. Recommend Penlac, script dispense.    I advised him on keeping nails neatly trimmed, removing all sharp and loose edges. Filing the nail as necessary to prevent damage to nail plate and prevent unnecessary pulling of toenail. Also advised to avoid tight fitting shoes to prevent pressure related issues. Recommend shoes with wide toe box or open toed shoe to reduce pressure.     Shoe inspection. Diabetic Foot Education. Patient reminded of the importance of good nutrition and blood sugar control to help prevent podiatric complications of diabetes. Patient instructed on proper foot hygeine. We discussed wearing proper shoe gear, daily foot inspections, never walking without protective shoe gear, never putting sharp instruments to feet, routine podiatric nail visits      RTC in 1 year, sooner PRN

## 2022-05-30 ENCOUNTER — SPECIALTY PHARMACY (OUTPATIENT)
Dept: PHARMACY | Facility: CLINIC | Age: 62
End: 2022-05-30
Payer: COMMERCIAL

## 2022-05-30 NOTE — TELEPHONE ENCOUNTER
Repatha restart rx received   -PA is required.  Will submit PA on 6/1/22 when coverage is effective.   [facility] key: [id number]

## 2022-06-01 ENCOUNTER — PATIENT MESSAGE (OUTPATIENT)
Dept: PHARMACY | Facility: CLINIC | Age: 62
End: 2022-06-01
Payer: COMMERCIAL

## 2022-06-01 NOTE — TELEPHONE ENCOUNTER
An active PA with [employer] is already on file with expiration date of 02/15/2023.    Test billing copay: $5     Hello, this is Celeste Sigala with [facility].  We are working on your prescription that your doctor has sent us. We will be working with your insurance to get this approved for you. We will be calling you along the way with updates on your medication.  If you have any questions, you can reach us at [phone].    Welcome call outcome: Patient/caregiver reached

## 2022-06-02 ENCOUNTER — SPECIALTY PHARMACY (OUTPATIENT)
Dept: PHARMACY | Facility: CLINIC | Age: 62
End: 2022-06-02
Payer: COMMERCIAL

## 2022-06-02 DIAGNOSIS — E78.5 HYPERLIPIDEMIA, UNSPECIFIED HYPERLIPIDEMIA TYPE: Primary | ICD-10-CM

## 2022-06-02 NOTE — TELEPHONE ENCOUNTER
Outgoing call for initial consult. Patient's fiance stated she would like to  and have initial consult in person 6/3.

## 2022-06-03 NOTE — TELEPHONE ENCOUNTER
Specialty Pharmacy - Provider Intervention    Subcategory:  Drug therapy appropriateness: Does not meet treatment guidelines    Impact on patient care:  Elimination of therapy inappropriateness    Additional Notes  Per Maritza at Dr. Oropeza office, the patient is to inject Repatha every 30 days. Repatha is  typically given 140 mg every 14 days.

## 2022-06-08 NOTE — TELEPHONE ENCOUNTER
Incoming call from pts wife stating that she will come in Friday for initial consult bc pt is having a root canal tomorrow

## 2022-06-13 ENCOUNTER — TELEPHONE (OUTPATIENT)
Dept: CARDIOLOGY | Facility: CLINIC | Age: 62
End: 2022-06-13
Payer: COMMERCIAL

## 2022-06-13 DIAGNOSIS — E11.69 HYPERLIPIDEMIA ASSOCIATED WITH TYPE 2 DIABETES MELLITUS: Primary | ICD-10-CM

## 2022-06-13 DIAGNOSIS — E78.5 HYPERLIPIDEMIA ASSOCIATED WITH TYPE 2 DIABETES MELLITUS: Primary | ICD-10-CM

## 2022-06-13 NOTE — TELEPHONE ENCOUNTER
----- Message from Vanessa Oropeza MD sent at 6/13/2022  2:10 PM CDT -----  Regarding: RE: Repatha  Thank  you. On May 22 he was informed to restart Rapatha and continue Zetia. I am not sure what was not clear for him of his fiance.  Please refill and let him start. Thank you.  ----- Message -----  From: Shara Gilbert PharmD  Sent: 6/13/2022   1:17 PM CDT  To: Vanessa Oropeza MD  Subject: Repatha                                          Good afternoon Dr. Oropeza,    I spoke to Mr. Hinds's fiance today to dispense Repatha and she inquired about whether he should continue taking Zetia. I advised to continue Zetia until notified otherwise. May someone from your office please follow up with them in regards to this?    Thank you,    Shara Gilbert PharmD  Clinical Pharmacist  [facility]  [phone]

## 2022-06-13 NOTE — TELEPHONE ENCOUNTER
I spke w/Ms. Lopez and she wanted to make sure that pt is to continue taking Zetia w/Repatha. I advised MsRubi John that pt is to continue taking Zetia w/Repatha, verbalized understanding.

## 2022-06-13 NOTE — TELEPHONE ENCOUNTER
Specialty Pharmacy - Initial Clinical Assessment    Specialty Medication Orders Linked to Encounter    Flowsheet Row Most Recent Value   Medication #1 evolocumab (REPATHA SURECLICK) 140 mg/mL PnIj (Order#[id number], Rx#[id number])        Patient Diagnosis   Z78.9 - Statin intolerance  E11.69, E78.5 - Hyperlipidemia associated with type 2 diabetes mellitus    Subjective    Darrell Hinds Jr. is a 61 y.o. male, who is followed by the specialty pharmacy service for management and education.    Recent Encounters     Date Type Provider Description    06/02/2022 Specialty Pharmacy Karli Og, Sheree Initial Clinical Assessment    05/30/2022 Specialty Pharmacy Celeste Sigala, Sheree Referral Authorization    02/22/2022 Specialty Pharmacy Angela Almeida PharmD Initial Clinical Assessment    02/15/2022 Specialty Pharmacy Angela Almeida, Sheree Referral Authorization    02/11/2022 Specialty Pharmacy Zeina Boss, Sheree Refill Coordination; Referral Authorization        Clinical call attempts since last clinical assessment   6/2/2022  9:19 PM - Specialty Pharmacy - Clinical Assessment by Karli Og PharmD     Current Outpatient Medications   Medication Sig    albuterol (PROVENTIL/VENTOLIN HFA) 90 mcg/actuation inhaler Inhale 1-2 puffs into the lungs every 6 (six) hours as needed for Wheezing or Shortness of Breath. Rescue    aspirin (ECOTRIN) 81 MG EC tablet Take 81 mg by mouth once daily.    avanafiL (STENDRA) 200 mg Tab Take 200 mg by mouth as needed (prn ED).    ciclopirox (PENLAC) 8 % Soln Apply topically nightly.    clopidogreL (PLAVIX) 75 mg tablet Take 1 tablet (75 mg total) by mouth once daily.    evolocumab (REPATHA SURECLICK) 140 mg/mL PnIj Inject 1 mL (140 mg total) into the skin every 30 days.    ezetimibe (ZETIA) 10 mg tablet Take 1 tablet (10 mg total) by mouth once daily.    famotidine (PEPCID) 20 MG tablet Take 1 tablet (20 mg total) by mouth 2 (two) times daily.    glucose 4 GM  "chewable tablet Take 4 tablets (16 g total) by mouth as needed for Low blood sugar (If having symptoms of blurry vision, palpitations, confusion, shakiness.  Please check sugars and if sugar below 70 please take 4 tablets and re-check sugar everry 15 minutes until sugars are above 70 and symptoms resolve.).    glucose 4 GM chewable tablet Take 4 tablets (16 g total) by mouth as needed for Low blood sugar (If having symptoms of blurry vision, palpitations, confusion, shakiness.  Please check sugars and if sugar below 70 please take 4 tablets and re-check sugar everry 15 minutes until sugars are above 70 and symptoms resolve.).    insulin detemir U-100 (LEVEMIR FLEXTOUCH U-100 INSULN) 100 unit/mL (3 mL) InPn pen Inject 18 Units into the skin every evening.    JARDIANCE 25 mg tablet TAKE 1 TABLET (25 MG TOTAL) BY MOUTH ONCE DAILY.    losartan (COZAAR) 25 MG tablet Take 1 tablet (25 mg total) by mouth once daily.    metFORMIN (GLUCOPHAGE) 1000 MG tablet Take 1 tablet (1,000 mg total) by mouth 2 (two) times daily.    metoprolol succinate (TOPROL-XL) 25 MG 24 hr tablet TAKE ONE TABLET BY MOUTH EVERY DAY    pantoprazole (PROTONIX) 40 MG tablet Take 1 tablet (40 mg total) by mouth once daily.    BD ULTRA-FINE NAT PEN NEEDLE 32 gauge x 5/32" Ndle ONCE A DAY    pulse oximeter (PULSE OXIMETER) device by Apply Externally route 2 (two) times a day. Use twice daily at 8 AM and 3 PM and record the value in [facility] as directed.    semaglutide (RYBELSUS) 7 mg tablet Take 1 tablet (7 mg total) by mouth once daily.    sildenafil (REVATIO) 20 mg Tab Take 3 tablets (60 mg total) by mouth as needed (ED).    vardenafiL (LEVITRA) 20 MG tablet Take 1 tablet (20 mg total) by mouth daily as needed for Erectile Dysfunction.    vardenafiL (STAXYN) 10 mg TbDL Take 10 mg by mouth once daily.   Last reviewed on 6/13/2022 12:58 PM by Shara Gilbert PharmD    Review of patient's allergies indicates:  No Known AllergiesLast reviewed on "  6/13/2022 12:54 PM by Shara Gilbert    Drug Interactions    Drug interactions evaluated: yes  Clinically relevant drug interactions identified: no  Provided the patient with educational material regarding drug interactions: not applicable         Adverse Effects    *All other systems reviewed and are negative       Assessment Questions - Documented Responses    Flowsheet Row Most Recent Value   Assessment    Medication Reconciliation completed for patient Yes   During the past 4 weeks, has patient missed any activities due to condition or medication? No   During the past 4 weeks, did patient have any of the following urgent care visits? None   Goals of Therapy Status Discussed (new start)   Status of the patients ability to self-administer: Caregiver to administer   All education points have been covered with patient? Yes, supplemental printed education provided   Welcome packet contents reviewed and discussed with patient? No   Assesment completed? Yes   Plan Therapy being initiated   Do you need to open a clinical intervention (i-vent)? No   Do you want to schedule first shipment? Yes   Medication #1 Assessment Info    Patient status New medication, Exisiting to [facility]   Is this medication appropriate for the patient? Yes   Is this medication effective? Not yet started        Refill Questions - Documented Responses    Flowsheet Row Most Recent Value   Patient Availability and HIPAA Verification    Does patient want to proceed with activity? Yes   HIPAA/medical authority confirmed? Yes   Relationship to patient of person spoken to? Spouse/Significant Other   Refill Screening Questions    When does the patient need to receive the medication? 06/13/22   Refill Delivery Questions    How will the patient receive the medication? Pickup   When does the patient need to receive the medication? 06/13/22   Address in [facility] confirmed and updated if neccessary? No   Expected Copay ($) 5   Is the patient able to  "afford the medication copay? Yes   Payment Method at pickup   Days supply of Refill 60   Supplies needed? Alcohol Swabs   Refill activity completed? Yes   Refill activity plan Refill scheduled   Shipment/Pickup Date: 06/13/22          Objective    He has a past medical history of Cataract, Diabetes mellitus, type 2, Diabetic retinopathy, Encounter for cosmetic surgery, GERD (gastroesophageal reflux disease), Hyperlipidemia, Hypertension, Hypertrophy of nasal turbinates, Kidney stone (2009), Nasal obstruction, Nasal septal deformity, and Unspecified disorder of kidney and ureter.    Tried/failed medications: Statins, Praluent    BP Readings from Last 4 Encounters:   05/25/22 (!) 151/84   05/18/22 137/74   05/02/22 120/70   02/04/22 116/70     Ht Readings from Last 4 Encounters:   05/25/22 5' 9" (1.753 m)   05/18/22 5' 9" (1.753 m)   05/02/22 5' 9" (1.753 m)   02/04/22 5' 9" (1.753 m)     Wt Readings from Last 4 Encounters:   05/18/22 77.2 kg (170 lb 3.1 oz)   05/02/22 76.1 kg (167 lb 14.1 oz)   02/04/22 67.1 kg (148 lb)   02/02/22 71.3 kg (157 lb 4.8 oz)       The goals of prescribed drug therapy management include:  · Supporting patient to meet the prescriber's medical treatment objectives  · Improving or maintaining quality of life  · Maintaining optimal therapy adherence  · Minimizing and managing side effects      Goals of Therapy Status: Discussed (new start)    Assessment/Plan  Patient plans to start therapy on 06/13/22    Interventions added this encounter   Closed: [facility] Provider Intervention - Drug therapy appropriateness: evolocumab (REPATHA SURECLICK) 140 mg/mL PnIj     Indication, dosage, appropriateness, effectiveness, safety and convenience of his specialty medication(s) were reviewed today.     Patient Education   Patient received education on the following:    Expectations and possible outcomes of therapy   Proper use, timely administration, and missed dose management   Duration of therapy   Side " effects, including prevention, minimization, and management   Contraindications and safety precautions   New or changed medications, including prescribe and over the counter medications and supplements   Reviews recommended vaccinations, as appropriate   Storage, safe handling, and disposal    Initial consult for Lexy was completed in person with patient's fiance. Reviewed all pertinent information regarding medication use as well as proper storage and disposal. Reviewed patient's allergies and medications. No clinically significant DDIs were identified. Reviewed patient's labs: LDL-C elevated at 111 mg/dL (5/18/22). Therapy appropriate to initiate. Patient's fiance verbalized understanding, had no further questions, and was advised to reach out to [facility] should any arise.     Tasks added this encounter   8/5/2022 - Refill Call (Auto Added)   Tasks due within next 3 months   No tasks due.     Shara Gilbert, PharmD  [facility]  [address] [place] [id number]  Phone: [phone]  Fax: [phone]

## 2022-07-18 DIAGNOSIS — R63.4 WEIGHT LOSS: ICD-10-CM

## 2022-07-18 RX ORDER — PANTOPRAZOLE SODIUM 40 MG/1
40 TABLET, DELAYED RELEASE ORAL DAILY
Qty: 30 TABLET | Refills: 5 | Status: SHIPPED | OUTPATIENT
Start: 2022-07-18 | End: 2022-10-31

## 2022-08-05 ENCOUNTER — SPECIALTY PHARMACY (OUTPATIENT)
Dept: PHARMACY | Facility: CLINIC | Age: 62
End: 2022-08-05

## 2022-08-05 NOTE — TELEPHONE ENCOUNTER
Outgoing Call: Spoke with patient regarding Repatha, due to inject on 8/15 already have 1 on hane. Would like if [facility] follow up in a few weeks to schedule next month delivery.

## 2022-08-17 ENCOUNTER — OFFICE VISIT (OUTPATIENT)
Dept: OPTOMETRY | Facility: CLINIC | Age: 62
End: 2022-08-17
Payer: COMMERCIAL

## 2022-08-17 DIAGNOSIS — H25.13 NUCLEAR SCLEROSIS OF BOTH EYES: ICD-10-CM

## 2022-08-17 DIAGNOSIS — H52.7 REFRACTIVE ERROR: ICD-10-CM

## 2022-08-17 PROCEDURE — 92014 COMPRE OPH EXAM EST PT 1/>: CPT | Mod: S$GLB,,, | Performed by: OPTOMETRIST

## 2022-08-17 PROCEDURE — 92014 PR EYE EXAM, EST PATIENT,COMPREHESV: ICD-10-PCS | Mod: S$GLB,,, | Performed by: OPTOMETRIST

## 2022-08-17 PROCEDURE — 92015 DETERMINE REFRACTIVE STATE: CPT | Mod: S$GLB,,, | Performed by: OPTOMETRIST

## 2022-08-17 PROCEDURE — 99999 PR PBB SHADOW E&M-EST. PATIENT-LVL IV: CPT | Mod: PBBFAC,,, | Performed by: OPTOMETRIST

## 2022-08-17 PROCEDURE — 99999 PR PBB SHADOW E&M-EST. PATIENT-LVL IV: ICD-10-PCS | Mod: PBBFAC,,, | Performed by: OPTOMETRIST

## 2022-08-17 PROCEDURE — 92015 PR REFRACTION: ICD-10-PCS | Mod: S$GLB,,, | Performed by: OPTOMETRIST

## 2022-08-19 NOTE — TELEPHONE ENCOUNTER
Spoke w pt's wife regarding Repatha refill. Pt injected on 8/15 and will not be due until 9/14. Asked if we could call a week before pt is due and pt's wife agreed. Pt's wife also stated that she would like for this fill to be shipped, confirmed address on file.

## 2022-09-02 ENCOUNTER — OFFICE VISIT (OUTPATIENT)
Dept: ENDOCRINOLOGY | Facility: CLINIC | Age: 62
End: 2022-09-02
Payer: COMMERCIAL

## 2022-09-02 VITALS
HEIGHT: 69 IN | SYSTOLIC BLOOD PRESSURE: 129 MMHG | WEIGHT: 177.81 LBS | BODY MASS INDEX: 26.33 KG/M2 | HEART RATE: 63 BPM | DIASTOLIC BLOOD PRESSURE: 72 MMHG

## 2022-09-02 DIAGNOSIS — E78.5 HYPERLIPIDEMIA, UNSPECIFIED HYPERLIPIDEMIA TYPE: ICD-10-CM

## 2022-09-02 DIAGNOSIS — I10 HYPERTENSION, ESSENTIAL: ICD-10-CM

## 2022-09-02 PROCEDURE — 95251 PR GLUCOSE MONITOR, 72 HOUR, PHYS INTERP: ICD-10-PCS | Mod: S$GLB,,, | Performed by: GENERAL ACUTE CARE HOSPITAL

## 2022-09-02 PROCEDURE — 99999 PR PBB SHADOW E&M-EST. PATIENT-LVL III: ICD-10-PCS | Mod: PBBFAC,,, | Performed by: GENERAL ACUTE CARE HOSPITAL

## 2022-09-02 PROCEDURE — 99214 OFFICE O/P EST MOD 30 MIN: CPT | Mod: 25,S$GLB,, | Performed by: GENERAL ACUTE CARE HOSPITAL

## 2022-09-02 PROCEDURE — 95251 CONT GLUC MNTR ANALYSIS I&R: CPT | Mod: S$GLB,,, | Performed by: GENERAL ACUTE CARE HOSPITAL

## 2022-09-02 PROCEDURE — 99214 PR OFFICE/OUTPT VISIT, EST, LEVL IV, 30-39 MIN: ICD-10-PCS | Mod: 25,S$GLB,, | Performed by: GENERAL ACUTE CARE HOSPITAL

## 2022-09-02 PROCEDURE — 99999 PR PBB SHADOW E&M-EST. PATIENT-LVL III: CPT | Mod: PBBFAC,,, | Performed by: GENERAL ACUTE CARE HOSPITAL

## 2022-09-02 NOTE — PROGRESS NOTES
Subjective:      Patient ID: Darrell Hinds Jr. is a 62 y.o. male.    Chief Complaint:  DM2; Initial visit     History of Present Illness  61 YO Male w/ dx of DM2, HTN, CAD s/p stent and HLD that presents to the endocrine clinic for follow up.  Pt today reports feels well but endorses recent bilateral great toe ingrown nail which he is currently taking antibiotics for.  Pt denies hyper or hypoglycemic symptoms.      Interval history:     Pt was last seen on 5/2022 and at that time plan was:     -Increase Levemir to 18 units daily      -C/w Metformin 1g BID      -C/w Jardiance 25mg daily (Side effects of dehydration and euglycemic DKA discussed)      -If A1C remains above goal and weight is stable will consider restarting GLP-1 at that time.          With regards to Diabetes Mellitus:   -Type 2    -Duration:  Dx 20 yrs.  On blood work.    -FH of DM?   Father and Mother DM2,  Brother and sister DM2    -Microvascular complications: (DENIES)   -Macrovascular complications:  (CAD s/p stent)   -DKA?  DENIES   -HHS?  DENIES   -DM Medications:   Metformin 1g BID,  Jardiance 25mg daily and Levemir 18 units      -Previously tried medications:  NONE     -Compliance w/ Meds:   YES     -Hyperglycemia symptoms: DENIES   -Hypoglycemia symptoms:  DENIES     -Know how to correct hypoglycemias:  Yes, glucose tablets          -Glucose monitoring:   HAS FREESTYLE [place]   REPORT BELOW:           TIR AT GOAL   TBR AT GOAL     TAR  AT GOAL       -Diet:  B (Egg, howard, toast, coffee no sugar, milk),  L (Chicken, Taco chips, tacos  3 times a week)  D (Baked potatoe, corn, Sushi),  S (keto snacks, crackers) Drinks (Denies)   -Activity:   Walking 30 mins 2-3 weeks   -Pancreatitis?  DENIES   -CHF?  DENIES   -UTIs?  DENIES   -Thyroid CA?  DENIES   -ETOH Abuse?  DENIES     Diabetes Management Status    Statin: Not taking  ACE/ARB: Taking    Screening or Prevention Patient's value Goal Complete/Controlled?   HgA1C Testing and Control   Lab  Results   Component Value Date    HGBA1C 8.8 (H) 08/22/2022      Annually/Less than 8% No   Lipid profile : 05/18/2022 Annually Yes   LDL control Lab Results   Component Value Date    LDLCALC 111.0 05/18/2022    Annually/Less than 100 mg/dl  Yes   Nephropathy screening Lab Results   Component Value Date    LABMICR 9.0 08/22/2022     Lab Results   Component Value Date    PROTEINUA Negative 07/22/2021    Annually Yes   Blood pressure BP Readings from Last 1 Encounters:   05/25/22 (!) 151/84    Less than 140/90 Yes   Dilated retinal exam : 08/17/2022 Annually Yes   Foot exam   : 05/25/2022 Annually Yes        ROS:   As above    Objective:     There were no vitals taken for this visit.  BP Readings from Last 3 Encounters:   05/25/22 (!) 151/84   05/18/22 137/74   05/02/22 120/70     Wt Readings from Last 1 Encounters:   05/18/22 1024 77.2 kg (170 lb 3.1 oz)     There is no height or weight on file to calculate BMI.      Physical Exam  Vitals reviewed.   Constitutional:       General: He is not in acute distress.     Appearance: Normal appearance. He is well-developed. He is not ill-appearing.   HENT:      Nose: Nose normal. No rhinorrhea.      Mouth/Throat:      Mouth: Mucous membranes are moist.   Eyes:      Extraocular Movements: Extraocular movements intact.      Pupils: Pupils are equal, round, and reactive to light.      Comments: No proptosis, No lid lag, No conjunctival erythema    Neck:      Thyroid: No thyromegaly.      Trachea: No tracheal deviation.      Comments: No thyromegaly or Thyroid nodules palpated on exam   Cardiovascular:      Rate and Rhythm: Normal rate and regular rhythm.      Pulses: Normal pulses.   Pulmonary:      Effort: Pulmonary effort is normal.      Breath sounds: Normal breath sounds.   Abdominal:      Palpations: Abdomen is soft. There is no mass.      Tenderness: There is no abdominal tenderness.      Hernia: No hernia is present.      Comments: No scar tissue, No Violaceous striae     Musculoskeletal:         General: No swelling.      Cervical back: Neck supple. No tenderness.      Right lower leg: No edema.      Left lower leg: No edema.   Skin:     General: Skin is warm.      Findings: No rash.   Neurological:      General: No focal deficit present.      Mental Status: He is alert and oriented to person, place, and time.   Psychiatric:         Mood and Affect: Mood normal.         Judgment: Judgment normal.                Lab Review:   Lab Results   Component Value Date    HGBA1C 8.8 (H) 08/22/2022     Lab Results   Component Value Date    CHOL 176 05/18/2022    HDL 40 05/18/2022    LDLCALC 111.0 05/18/2022    TRIG 125 05/18/2022    CHOLHDL 22.7 05/18/2022     Lab Results   Component Value Date     04/25/2022    K 5.6 (H) 04/25/2022     04/25/2022    CO2 24 04/25/2022     (H) 04/25/2022    BUN 17 04/25/2022    CREATININE 0.75 04/25/2022    CALCIUM 9.0 04/25/2022    PROT 6.8 05/18/2022    ALBUMIN 4.2 05/18/2022    BILITOT 0.5 05/18/2022    ALKPHOS 63 05/18/2022    AST 22 05/18/2022    ALT 16 05/18/2022    ANIONGAP 13 04/25/2022    ESTGFRAFRICA >60.0 04/25/2022    EGFRNONAA >60.0 04/25/2022    TSH 0.686 01/31/2022     Vit D, 25-Hydroxy   Date Value Ref Range Status   07/22/2021 23 (L) 30 - 96 ng/mL Final     Comment:     Vitamin D deficiency.........<10 ng/mL                              Vitamin D insufficiency......10-29 ng/mL       Vitamin D sufficiency........> or equal to 30 ng/mL  Vitamin D toxicity............>100 ng/mL         Assessment and Plan     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  No, Will  give referral today    PLAN:      Due to uncontrolled DM but with some improvement of A1C since last visit and weight loss on GLP-1a.  I will recommend the following       Start Januvia 100mg daily     -C/w Levemir to 18 units daily      -C/w Metformin 1g BID     -C/w Jardiance 25mg daily (Side effects of dehydration and euglycemic DKA discussed)     -Keep FS log to send for review in 2 week.     -Labs 1 week prior to next visit in 3 months      Hyperlipidemia, unspecified hyperlipidemia type  LDL at goal On Statin   Low Fat diet     Hypertension, essential  BP controlled on current BP meds   On ARB  for renal protection   Low Na diet

## 2022-09-02 NOTE — PATIENT INSTRUCTIONS
Your A1C is improving which is good news but we still have some way to go.     We will start Januvia 100mg once a day in the morning.     Continue with levemir 18 units once a day     Continue with Metformin 1,000mg twice a day     Continue with Jardiance 25mg once a day.     Continue working on diet and exercise to be able to eventually start decreasing insulin doses.     Blood work prior to next visit in 4 months.     If any questions feel free to contact me.     Have a nice day.     Sincerely,       Contreras Dunn MD   Endocrinology   9/2/2022 10:39 AM

## 2022-09-12 ENCOUNTER — SPECIALTY PHARMACY (OUTPATIENT)
Dept: PHARMACY | Facility: CLINIC | Age: 62
End: 2022-09-12
Payer: COMMERCIAL

## 2022-09-12 NOTE — TELEPHONE ENCOUNTER
Received incoming call from patient's wife regarding repatha refill. Patient needs to contact plan regarding premium. Wife stated they will contact [facility] back once they call insurance.

## 2022-09-16 NOTE — TELEPHONE ENCOUNTER
"Specialty Pharmacy - Refill Coordination    Specialty Medication Orders Linked to Encounter      Flowsheet Row Most Recent Value   Medication #1 evolocumab (REPATHA SURECLICK) 140 mg/mL PnIj (Order#[id number], Rx#[id number])            Refill Questions - Documented Responses      Flowsheet Row Most Recent Value   Patient Availability and HIPAA Verification    Does patient want to proceed with activity? Yes   HIPAA/medical authority confirmed? Yes   Relationship to patient of person spoken to? Self   Refill Screening Questions    Would patient like to speak to a pharmacist? No   When does the patient need to receive the medication? 09/19/22   Refill Delivery Questions    How will the patient receive the medication? Delivery Sandy   When does the patient need to receive the medication? 09/19/22   Shipping Address Home   Address in [facility] confirmed and updated if neccessary? Yes   Expected Copay ($) 4.99   Is the patient able to afford the medication copay? Yes   Payment Method CC on file   Days supply of Refill 60   Supplies needed? No supplies needed   Refill activity completed? Yes   Refill activity plan Refill scheduled   Shipment/Pickup Date: 09/16/22            Current Outpatient Medications   Medication Sig    albuterol (PROVENTIL/VENTOLIN HFA) 90 mcg/actuation inhaler Inhale 1-2 puffs into the lungs every 6 (six) hours as needed for Wheezing or Shortness of Breath. Rescue    aspirin (ECOTRIN) 81 MG EC tablet Take 81 mg by mouth once daily.    avanafiL (STENDRA) 200 mg Tab Take 200 mg by mouth as needed (prn ED).    BD ULTRA-FINE NAT PEN NEEDLE 32 gauge x 5/32" Ndle ONCE A DAY    ciclopirox (PENLAC) 8 % Soln Apply topically nightly.    clopidogreL (PLAVIX) 75 mg tablet Take 1 tablet (75 mg total) by mouth once daily.    evolocumab (REPATHA SURECLICK) 140 mg/mL PnIj Inject 1 mL (140 mg total) into the skin every 30 days.    ezetimibe (ZETIA) 10 mg tablet Take 1 tablet (10 mg total) by mouth once daily. "    famotidine (PEPCID) 20 MG tablet Take 1 tablet (20 mg total) by mouth 2 (two) times daily.    glucose 4 GM chewable tablet Take 4 tablets (16 g total) by mouth as needed for Low blood sugar (If having symptoms of blurry vision, palpitations, confusion, shakiness.  Please check sugars and if sugar below 70 please take 4 tablets and re-check sugar everry 15 minutes until sugars are above 70 and symptoms resolve.).    glucose 4 GM chewable tablet Take 4 tablets (16 g total) by mouth as needed for Low blood sugar (If having symptoms of blurry vision, palpitations, confusion, shakiness.  Please check sugars and if sugar below 70 please take 4 tablets and re-check sugar everry 15 minutes until sugars are above 70 and symptoms resolve.).    insulin detemir U-100 (LEVEMIR FLEXTOUCH U-100 INSULN) 100 unit/mL (3 mL) InPn pen Inject 18 Units into the skin every evening.    JARDIANCE 25 mg tablet TAKE 1 TABLET (25 MG TOTAL) BY MOUTH ONCE DAILY.    losartan (COZAAR) 25 MG tablet TAKE 1 TABLET (25 MG TOTAL) BY MOUTH ONCE DAILY.    metFORMIN (GLUCOPHAGE) 1000 MG tablet Take 1 tablet (1,000 mg total) by mouth 2 (two) times daily.    metoprolol succinate (TOPROL-XL) 25 MG 24 hr tablet TAKE ONE TABLET BY MOUTH EVERY DAY    pantoprazole (PROTONIX) 40 MG tablet Take 1 tablet (40 mg total) by mouth once daily.    pulse oximeter (PULSE OXIMETER) device by Apply Externally route 2 (two) times a day. Use twice daily at 8 AM and 3 PM and record the value in [facility] as directed.    sildenafil (REVATIO) 20 mg Tab Take 3 tablets (60 mg total) by mouth as needed (ED).    SITagliptin (JANUVIA) 100 MG Tab Take 1 tablet (100 mg total) by mouth once daily.    vardenafiL (LEVITRA) 20 MG tablet Take 1 tablet (20 mg total) by mouth daily as needed for Erectile Dysfunction.    vardenafiL (STAXYN) 10 mg TbDL Take 10 mg by mouth once daily.   Last reviewed on 9/2/2022 12:01 PM by Contreras Eden MD    Review of patient's allergies indicates:  No Known  Allergies Last reviewed on  9/2/2022 12:01 PM by Contreras Dunn      Tasks added this encounter   11/8/2022 - Refill Call (Auto Added)   Tasks due within next 3 months   No tasks due.     Marva Florez, PharmD  [facility]  [address] [place] [id number]  Phone: [phone]  Fax: [phone]

## 2022-09-19 DIAGNOSIS — E11.69 HYPERLIPIDEMIA ASSOCIATED WITH TYPE 2 DIABETES MELLITUS: ICD-10-CM

## 2022-09-19 DIAGNOSIS — E78.5 HYPERLIPIDEMIA ASSOCIATED WITH TYPE 2 DIABETES MELLITUS: ICD-10-CM

## 2022-09-20 RX ORDER — EZETIMIBE 10 MG/1
10 TABLET ORAL DAILY
Qty: 30 TABLET | Refills: 3 | Status: SHIPPED | OUTPATIENT
Start: 2022-09-20 | End: 2023-02-17

## 2022-09-21 ENCOUNTER — TELEPHONE (OUTPATIENT)
Dept: CARDIOLOGY | Facility: CLINIC | Age: 62
End: 2022-09-21

## 2022-09-21 ENCOUNTER — TELEPHONE (OUTPATIENT)
Dept: CARDIOLOGY | Facility: CLINIC | Age: 62
End: 2022-09-21
Payer: COMMERCIAL

## 2022-09-21 DIAGNOSIS — E11.69 HYPERLIPIDEMIA ASSOCIATED WITH TYPE 2 DIABETES MELLITUS: ICD-10-CM

## 2022-09-21 DIAGNOSIS — I10 ESSENTIAL HYPERTENSION: Primary | ICD-10-CM

## 2022-09-21 DIAGNOSIS — E78.5 HYPERLIPIDEMIA ASSOCIATED WITH TYPE 2 DIABETES MELLITUS: ICD-10-CM

## 2022-09-21 NOTE — TELEPHONE ENCOUNTER
----- Message from Vanessa Oropeza MD sent at 9/21/2022 11:03 AM CDT -----  Please inform the patient that overall lipids are better, but TG-C have increased. E should limit cabs and alcohol.  We should repeat it in 3 months

## 2022-10-25 ENCOUNTER — TELEPHONE (OUTPATIENT)
Dept: CARDIOLOGY | Facility: CLINIC | Age: 62
End: 2022-10-25
Payer: COMMERCIAL

## 2022-10-25 NOTE — TELEPHONE ENCOUNTER
----- Message from Vanessa Oropeza MD sent at 10/25/2022  8:26 AM CDT -----  Please inform the patient that except for BS all blood work is fine.  He should discuss Diabetes management with his PCP, Dr. Navarro.

## 2022-10-26 ENCOUNTER — TELEPHONE (OUTPATIENT)
Dept: GASTROENTEROLOGY | Facility: CLINIC | Age: 62
End: 2022-10-26
Payer: COMMERCIAL

## 2022-10-31 ENCOUNTER — OFFICE VISIT (OUTPATIENT)
Dept: CARDIOLOGY | Facility: CLINIC | Age: 62
End: 2022-10-31
Payer: COMMERCIAL

## 2022-10-31 VITALS
DIASTOLIC BLOOD PRESSURE: 77 MMHG | HEART RATE: 63 BPM | WEIGHT: 174.38 LBS | SYSTOLIC BLOOD PRESSURE: 144 MMHG | BODY MASS INDEX: 26.43 KG/M2 | HEIGHT: 68 IN

## 2022-10-31 DIAGNOSIS — E11.65 TYPE 2 DIABETES MELLITUS WITH HYPERGLYCEMIA, WITHOUT LONG-TERM CURRENT USE OF INSULIN: Chronic | ICD-10-CM

## 2022-10-31 DIAGNOSIS — I70.0 AORTIC ATHEROSCLEROSIS: ICD-10-CM

## 2022-10-31 DIAGNOSIS — I25.10 CORONARY ARTERY DISEASE INVOLVING NATIVE CORONARY ARTERY OF NATIVE HEART WITHOUT ANGINA PECTORIS: Primary | ICD-10-CM

## 2022-10-31 DIAGNOSIS — I10 ESSENTIAL HYPERTENSION: ICD-10-CM

## 2022-10-31 DIAGNOSIS — Z78.9 STATIN INTOLERANCE: ICD-10-CM

## 2022-10-31 DIAGNOSIS — E11.69 HYPERLIPIDEMIA ASSOCIATED WITH TYPE 2 DIABETES MELLITUS: ICD-10-CM

## 2022-10-31 DIAGNOSIS — E78.5 HYPERLIPIDEMIA ASSOCIATED WITH TYPE 2 DIABETES MELLITUS: ICD-10-CM

## 2022-10-31 PROCEDURE — 99999 PR PBB SHADOW E&M-EST. PATIENT-LVL IV: CPT | Mod: PBBFAC,,, | Performed by: INTERNAL MEDICINE

## 2022-10-31 PROCEDURE — 99214 PR OFFICE/OUTPT VISIT, EST, LEVL IV, 30-39 MIN: ICD-10-PCS | Mod: S$GLB,,, | Performed by: INTERNAL MEDICINE

## 2022-10-31 PROCEDURE — 99214 OFFICE O/P EST MOD 30 MIN: CPT | Mod: S$GLB,,, | Performed by: INTERNAL MEDICINE

## 2022-10-31 PROCEDURE — 99999 PR PBB SHADOW E&M-EST. PATIENT-LVL IV: ICD-10-PCS | Mod: PBBFAC,,, | Performed by: INTERNAL MEDICINE

## 2022-10-31 RX ORDER — LOSARTAN POTASSIUM 50 MG/1
50 TABLET ORAL DAILY
COMMUNITY
End: 2022-12-29

## 2022-10-31 NOTE — PROGRESS NOTES
Subjective:   Patient ID:  Darrell Hinds Jr. is a 62 y.o. male who presents for follow-up of Annual Exam and Hyperlipidemia      HPI: Recent history: taking medications as instructed, no medication side effects noted, no TIA's, no chest pain on exertion, no dyspnea on exertion and no swelling of ankles. Patient's symptoms have been unchanged. No medication side effects.BP is elevated. Lipids re at goal.  Diabetes is not controlled.  Patient is sedentary.      Lab Results   Component Value Date     10/24/2022    K 4.9 10/24/2022     10/24/2022    CO2 24 10/24/2022    BUN 16 10/24/2022    CREATININE 0.83 10/24/2022     (H) 10/24/2022    HGBA1C 8.8 (H) 08/22/2022    AST 21 10/24/2022    AST 21 10/24/2022    ALT 16 10/24/2022    ALT 16 10/24/2022    ALBUMIN 4.3 10/24/2022    ALBUMIN 4.3 10/24/2022    PROT 6.9 10/24/2022    PROT 6.9 10/24/2022    BILITOT 0.4 10/24/2022    BILITOT 0.4 10/24/2022    WBC 7.76 01/10/2022    HGB 13.5 (L) 01/10/2022    HCT 39.2 (L) 01/10/2022    MCV 92 01/10/2022     01/10/2022    INR 1.0 01/08/2022    PSA 0.45 07/22/2021    TSH 1.780 09/19/2022    CHOL 105 (L) 10/24/2022    HDL 42 10/24/2022    LDLCALC 43.2 (L) 10/24/2022    TRIG 99 10/24/2022       No results found in the last 24 hours.     Review of Systems   Constitutional: Positive for weight gain.   HENT: Negative.     Eyes: Negative.    Cardiovascular: Negative.  Negative for chest pain, claudication, dyspnea on exertion, irregular heartbeat, leg swelling, near-syncope, palpitations and syncope.   Respiratory: Negative.  Negative for cough, shortness of breath, snoring and wheezing.    Endocrine: Negative.  Negative for cold intolerance, heat intolerance, polydipsia, polyphagia and polyuria.   Skin: Negative.    Musculoskeletal: Negative.    Gastrointestinal: Negative.    Genitourinary: Negative.    Neurological: Negative.    Psychiatric/Behavioral: Negative.       Objective:   Physical Exam  Vitals and  "nursing note reviewed.   Constitutional:       Appearance: He is well-developed.      Comments: BP (!) 144/77   Pulse 63   Ht 5' 8" (1.727 m)   Wt 79.1 kg (174 lb 6.1 oz)   BMI 26.51 kg/m²      HENT:      Head: Normocephalic.   Eyes:      Pupils: Pupils are equal, round, and reactive to light.   Neck:      Thyroid: No thyromegaly.      Vascular: No carotid bruit.   Cardiovascular:      Rate and Rhythm: Normal rate and regular rhythm.      Pulses: Intact distal pulses.           Carotid pulses are 2+ on the right side and 2+ on the left side.       Radial pulses are 2+ on the right side and 2+ on the left side.        Femoral pulses are 2+ on the right side and 2+ on the left side.       Popliteal pulses are 2+ on the right side and 2+ on the left side.        Dorsalis pedis pulses are 2+ on the right side and 2+ on the left side.        Posterior tibial pulses are 2+ on the right side and 2+ on the left side.      Heart sounds: Normal heart sounds. No murmur heard.    No friction rub. No gallop.   Pulmonary:      Effort: Pulmonary effort is normal. No respiratory distress.      Breath sounds: Normal breath sounds. No wheezing or rales.   Chest:      Chest wall: No tenderness.   Abdominal:      General: Bowel sounds are normal.      Palpations: Abdomen is soft.   Musculoskeletal:         General: Normal range of motion.      Cervical back: Normal range of motion and neck supple.   Skin:     General: Skin is warm and dry.   Neurological:      Mental Status: He is alert and oriented to person, place, and time.         Assessment and Plan:     Problem List Items Addressed This Visit          Cardiology Problems    Essential hypertension    Hyperlipidemia associated with type 2 diabetes mellitus    Coronary artery disease involving native coronary artery of native heart without angina pectoris - Primary    Aortic atherosclerosis       Other    Type 2 diabetes mellitus with hyperglycemia (Chronic)    Statin " "intolerance       Patient's Medications   New Prescriptions    No medications on file   Previous Medications    ASPIRIN (ECOTRIN) 81 MG EC TABLET    Take 81 mg by mouth once daily.    AVANAFIL (STENDRA) 200 MG TAB    Take 200 mg by mouth as needed (prn ED).    BD ULTRA-FINE NAT PEN NEEDLE 32 GAUGE X 5/32" NDLE    ONCE A DAY    CICLOPIROX (PENLAC) 8 % SOLN    Apply topically nightly.    EVOLOCUMAB (REPATHA SURECLICK) 140 MG/ML PNIJ    Inject 1 mL (140 mg total) into the skin every 30 days.    EZETIMIBE (ZETIA) 10 MG TABLET    Take 1 tablet (10 mg total) by mouth once daily.    FAMOTIDINE (PEPCID) 20 MG TABLET    Take 1 tablet (20 mg total) by mouth 2 (two) times daily.    INSULIN DETEMIR U-100 (LEVEMIR FLEXTOUCH U-100 INSULN) 100 UNIT/ML (3 ML) INPN PEN    Inject 18 Units into the skin every evening.    JARDIANCE 25 MG TABLET    TAKE 1 TABLET (25 MG TOTAL) BY MOUTH ONCE DAILY.    LOSARTAN (COZAAR) 50 MG TABLET    Take 50 mg by mouth once daily.    METFORMIN (GLUCOPHAGE) 1000 MG TABLET    Take 1 tablet (1,000 mg total) by mouth 2 (two) times daily.    METOPROLOL SUCCINATE (TOPROL-XL) 25 MG 24 HR TABLET    TAKE ONE TABLET BY MOUTH EVERY DAY    PULSE OXIMETER (PULSE OXIMETER) DEVICE    by Apply Externally route 2 (two) times a day. Use twice daily at 8 AM and 3 PM and [facility].    SITAGLIPTIN (JANUVIA) 100 MG TAB    Take 1 tablet (100 mg total) by mouth once daily.   Modified Medications    No medications on file   Discontinued Medications    ALBUTEROL (PROVENTIL/VENTOLIN HFA) 90 MCG/ACTUATION INHALER    Inhale 1-2 puffs into the lungs every 6 (six) hours as needed for Wheezing or Shortness of Breath. Rescue    CLOPIDOGREL (PLAVIX) 75 MG TABLET    Take 1 tablet (75 mg total) by mouth once daily.    GLUCOSE 4 GM CHEWABLE TABLET    Take 4 tablets (16 g total) by mouth as needed for Low blood sugar (If having symptoms of blurry vision, palpitations, confusion, shakiness.  Please check sugars " and if sugar below 70 please take 4 tablets and re-check sugar everry 15 minutes until sugars are above 70 and symptoms resolve.).    GLUCOSE 4 GM CHEWABLE TABLET    Take 4 tablets (16 g total) by mouth as needed for Low blood sugar (If having symptoms of blurry vision, palpitations, confusion, shakiness.  Please check sugars and if sugar below 70 please take 4 tablets and re-check sugar everry 15 minutes until sugars are above 70 and symptoms resolve.).    LOSARTAN (COZAAR) 25 MG TABLET    TAKE 1 TABLET (25 MG TOTAL) BY MOUTH ONCE DAILY.    PANTOPRAZOLE (PROTONIX) 40 MG TABLET    Take 1 tablet (40 mg total) by mouth once daily.    SILDENAFIL (REVATIO) 20 MG TAB    Take 3 tablets (60 mg total) by mouth as needed (ED).    VARDENAFIL (LEVITRA) 20 MG TABLET    Take 1 tablet (20 mg total) by mouth daily as needed for Erectile Dysfunction.    VARDENAFIL (STAXYN) 10 MG TBDL    Take 10 mg by mouth once daily.     Increase Losartan to 50 mg daily and continue to monitor BP.  Upon return next year will consider repeating stress echo.  Secondary prevention, life style modification, proper diet and exercise encouraged  Follow up in about 1 year (around 10/31/2023).

## 2022-11-10 ENCOUNTER — SPECIALTY PHARMACY (OUTPATIENT)
Dept: PHARMACY | Facility: CLINIC | Age: 62
End: 2022-11-10
Payer: COMMERCIAL

## 2022-11-10 NOTE — TELEPHONE ENCOUNTER
"Specialty Pharmacy - Refill Coordination    Specialty Medication Orders Linked to Encounter      Flowsheet Row Most Recent Value   Medication #1 evolocumab (REPATHA SURECLICK) 140 mg/mL PnIj (Order#[id number], Rx#[id number])            Refill Questions - Documented Responses      Flowsheet Row Most Recent Value   Patient Availability and HIPAA Verification    Does patient want to proceed with activity? Yes   HIPAA/medical authority confirmed? Yes   Relationship to patient of person spoken to? Other   Refill Screening Questions    Would patient like to speak to a pharmacist? No   When does the patient need to receive the medication? 11/15/22   Refill Delivery Questions    How will the patient receive the medication? MED[facility]   When does the patient need to receive the medication? 11/15/22   Shipping Address Home   Address in [facility] confirmed and updated if neccessary? Yes   Expected Copay ($) 4.99   Is the patient able to afford the medication copay? Yes   Payment Method CC on file   Days supply of Refill 28   Supplies needed? No supplies needed   Refill activity completed? Yes   Refill activity plan Refill scheduled   Shipment/Pickup Date: 11/11/22            Current Outpatient Medications   Medication Sig    aspirin (ECOTRIN) 81 MG EC tablet Take 81 mg by mouth once daily.    avanafiL (STENDRA) 200 mg Tab Take 200 mg by mouth as needed (prn ED).    BD ULTRA-FINE NAT PEN NEEDLE 32 gauge x 5/32" Ndle ONCE A DAY    ciclopirox (PENLAC) 8 % Soln Apply topically nightly.    evolocumab (REPATHA SURECLICK) 140 mg/mL PnIj Inject 1 mL (140 mg total) into the skin every 30 days.    ezetimibe (ZETIA) 10 mg tablet Take 1 tablet (10 mg total) by mouth once daily.    famotidine (PEPCID) 20 MG tablet Take 1 tablet (20 mg total) by mouth 2 (two) times daily.    insulin detemir U-100 (LEVEMIR FLEXTOUCH U-100 INSULN) 100 unit/mL (3 mL) InPn pen Inject 18 Units into the skin every evening.    JARDIANCE 25 mg tablet TAKE 1 " TABLET (25 MG TOTAL) BY MOUTH ONCE DAILY.    losartan (COZAAR) 50 MG tablet Take 50 mg by mouth once daily.    metFORMIN (GLUCOPHAGE) 1000 MG tablet Take 1 tablet (1,000 mg total) by mouth 2 (two) times daily.    metoprolol succinate (TOPROL-XL) 25 MG 24 hr tablet TAKE ONE TABLET BY MOUTH EVERY DAY    pulse oximeter (PULSE OXIMETER) device by Apply Externally route 2 (two) times a day. Use twice daily at 8 AM and 3 PM and record the value in MyChart as directed.    SITagliptin (JANUVIA) 100 MG Tab Take 1 tablet (100 mg total) by mouth once daily.   Last reviewed on 10/31/2022 10:55 AM by Vanessa Oropeza MD    Review of patient's allergies indicates:  No Known Allergies Last reviewed on  10/31/2022 10:55 AM by Vanessa Oropeza      Tasks added this encounter   12/6/2022 - Refill Call (Auto Added)   Tasks due within next 3 months   No tasks due.     Huey Baker, PharmD  [facility]  [address] [place] [id number]  Phone: [phone]  Fax: [phone]

## 2022-11-29 ENCOUNTER — PATIENT MESSAGE (OUTPATIENT)
Dept: ENDOCRINOLOGY | Facility: CLINIC | Age: 62
End: 2022-11-29
Payer: COMMERCIAL

## 2022-11-30 ENCOUNTER — PATIENT MESSAGE (OUTPATIENT)
Dept: ENDOCRINOLOGY | Facility: CLINIC | Age: 62
End: 2022-11-30
Payer: COMMERCIAL

## 2022-11-30 DIAGNOSIS — E11.65 TYPE 2 DIABETES MELLITUS WITH HYPERGLYCEMIA, WITHOUT LONG-TERM CURRENT USE OF INSULIN: Primary | ICD-10-CM

## 2022-12-05 RX ORDER — METFORMIN HYDROCHLORIDE 1000 MG/1
1000 TABLET ORAL 2 TIMES DAILY
Qty: 180 TABLET | Refills: 1 | Status: SHIPPED | OUTPATIENT
Start: 2022-12-05 | End: 2023-01-30 | Stop reason: SDUPTHER

## 2022-12-06 DIAGNOSIS — E11.69 HYPERLIPIDEMIA ASSOCIATED WITH TYPE 2 DIABETES MELLITUS: ICD-10-CM

## 2022-12-06 DIAGNOSIS — E78.5 HYPERLIPIDEMIA ASSOCIATED WITH TYPE 2 DIABETES MELLITUS: ICD-10-CM

## 2022-12-06 RX ORDER — EVOLOCUMAB 140 MG/ML
140 INJECTION, SOLUTION SUBCUTANEOUS
Qty: 1 ML | Refills: 6 | Status: SHIPPED | OUTPATIENT
Start: 2022-12-06 | End: 2023-06-05 | Stop reason: ALTCHOICE

## 2022-12-23 ENCOUNTER — PATIENT MESSAGE (OUTPATIENT)
Dept: ADMINISTRATIVE | Facility: HOSPITAL | Age: 62
End: 2022-12-23
Payer: COMMERCIAL

## 2022-12-23 ENCOUNTER — PATIENT OUTREACH (OUTPATIENT)
Dept: ADMINISTRATIVE | Facility: HOSPITAL | Age: 62
End: 2022-12-23
Payer: COMMERCIAL

## 2022-12-28 ENCOUNTER — PATIENT MESSAGE (OUTPATIENT)
Dept: ADMINISTRATIVE | Facility: HOSPITAL | Age: 62
End: 2022-12-28
Payer: COMMERCIAL

## 2022-12-28 NOTE — TELEPHONE ENCOUNTER
Pt states that he was suppose to be prescribe a higher dose of his blood pressure medication. Pt have been taking double his current dose but his pressure is still running 152/92 , I advise pt to schedule an office visit, his last annual was 7/27/2021

## 2022-12-29 RX ORDER — LOSARTAN POTASSIUM 50 MG/1
50 TABLET ORAL DAILY
COMMUNITY
End: 2022-12-29 | Stop reason: SDUPTHER

## 2022-12-29 NOTE — TELEPHONE ENCOUNTER
Pt was seen by Dr Oropeza 10/31/22. He stated it was recommended to increase losartan to 50 mg dose but never received Rx at pharmacy so he is still on 25 mg dose with Bps running on average 152/92. Please advise if Dr Oropeza can send in Rx if appropriate.

## 2023-01-02 RX ORDER — LOSARTAN POTASSIUM 50 MG/1
50 TABLET ORAL DAILY
Qty: 30 TABLET | Refills: 6 | Status: SHIPPED | OUTPATIENT
Start: 2023-01-02 | End: 2023-07-17

## 2023-01-05 ENCOUNTER — SPECIALTY PHARMACY (OUTPATIENT)
Dept: PHARMACY | Facility: CLINIC | Age: 63
End: 2023-01-05
Payer: COMMERCIAL

## 2023-01-05 NOTE — TELEPHONE ENCOUNTER
Outgoing call regarding repatha refill; per pt's wife, he injects on the 15 of every month; he has a pen for 1/15 injection, and wife was informed that [facility] will follow up on 2/8 to schedule delivery

## 2023-01-17 NOTE — TELEPHONE ENCOUNTER
Patient previously receiving 2 mL for every 60 days. Next fill will be scheduled for 1 mL for 30 days by 2/15/23. Refill call for 2/8/23

## 2023-01-23 ENCOUNTER — PATIENT MESSAGE (OUTPATIENT)
Dept: INTERNAL MEDICINE | Facility: CLINIC | Age: 63
End: 2023-01-23
Payer: COMMERCIAL

## 2023-01-23 ENCOUNTER — OFFICE VISIT (OUTPATIENT)
Dept: URGENT CARE | Facility: CLINIC | Age: 63
End: 2023-01-23
Payer: COMMERCIAL

## 2023-01-23 VITALS
SYSTOLIC BLOOD PRESSURE: 154 MMHG | TEMPERATURE: 98 F | BODY MASS INDEX: 26.46 KG/M2 | DIASTOLIC BLOOD PRESSURE: 76 MMHG | HEART RATE: 73 BPM | RESPIRATION RATE: 19 BRPM | OXYGEN SATURATION: 96 % | WEIGHT: 174 LBS

## 2023-01-23 DIAGNOSIS — M25.569 KNEE PAIN, UNSPECIFIED CHRONICITY, UNSPECIFIED LATERALITY: Primary | ICD-10-CM

## 2023-01-23 DIAGNOSIS — M25.561 ACUTE PAIN OF RIGHT KNEE: Primary | ICD-10-CM

## 2023-01-23 PROCEDURE — 3077F PR MOST RECENT SYSTOLIC BLOOD PRESSURE >= 140 MM HG: ICD-10-PCS | Mod: CPTII,S$GLB,, | Performed by: NURSE PRACTITIONER

## 2023-01-23 PROCEDURE — 3078F PR MOST RECENT DIASTOLIC BLOOD PRESSURE < 80 MM HG: ICD-10-PCS | Mod: CPTII,S$GLB,, | Performed by: NURSE PRACTITIONER

## 2023-01-23 PROCEDURE — 3008F BODY MASS INDEX DOCD: CPT | Mod: CPTII,S$GLB,, | Performed by: NURSE PRACTITIONER

## 2023-01-23 PROCEDURE — 73562 XR KNEE 3 VIEW RIGHT: ICD-10-PCS | Mod: FY,RT,S$GLB, | Performed by: RADIOLOGY

## 2023-01-23 PROCEDURE — 1160F RVW MEDS BY RX/DR IN RCRD: CPT | Mod: CPTII,S$GLB,, | Performed by: NURSE PRACTITIONER

## 2023-01-23 PROCEDURE — 73562 X-RAY EXAM OF KNEE 3: CPT | Mod: FY,RT,S$GLB, | Performed by: RADIOLOGY

## 2023-01-23 PROCEDURE — 99214 OFFICE O/P EST MOD 30 MIN: CPT | Mod: S$GLB,,, | Performed by: NURSE PRACTITIONER

## 2023-01-23 PROCEDURE — 3077F SYST BP >= 140 MM HG: CPT | Mod: CPTII,S$GLB,, | Performed by: NURSE PRACTITIONER

## 2023-01-23 PROCEDURE — 1159F MED LIST DOCD IN RCRD: CPT | Mod: CPTII,S$GLB,, | Performed by: NURSE PRACTITIONER

## 2023-01-23 PROCEDURE — 1160F PR REVIEW ALL MEDS BY PRESCRIBER/CLIN PHARMACIST DOCUMENTED: ICD-10-PCS | Mod: CPTII,S$GLB,, | Performed by: NURSE PRACTITIONER

## 2023-01-23 PROCEDURE — 99214 PR OFFICE/OUTPT VISIT, EST, LEVL IV, 30-39 MIN: ICD-10-PCS | Mod: S$GLB,,, | Performed by: NURSE PRACTITIONER

## 2023-01-23 PROCEDURE — 3008F PR BODY MASS INDEX (BMI) DOCUMENTED: ICD-10-PCS | Mod: CPTII,S$GLB,, | Performed by: NURSE PRACTITIONER

## 2023-01-23 PROCEDURE — 1159F PR MEDICATION LIST DOCUMENTED IN MEDICAL RECORD: ICD-10-PCS | Mod: CPTII,S$GLB,, | Performed by: NURSE PRACTITIONER

## 2023-01-23 PROCEDURE — 3078F DIAST BP <80 MM HG: CPT | Mod: CPTII,S$GLB,, | Performed by: NURSE PRACTITIONER

## 2023-01-23 PROCEDURE — 4010F PR ACE/ARB THEARPY RXD/TAKEN: ICD-10-PCS | Mod: CPTII,S$GLB,, | Performed by: NURSE PRACTITIONER

## 2023-01-23 PROCEDURE — 4010F ACE/ARB THERAPY RXD/TAKEN: CPT | Mod: CPTII,S$GLB,, | Performed by: NURSE PRACTITIONER

## 2023-01-23 NOTE — TELEPHONE ENCOUNTER
Pt states he have been suffering with knee pain on rt leg, and it doesn't seem to be getting any better ,     Pt is asking do he need a referral ?

## 2023-01-23 NOTE — PROGRESS NOTES
Subjective:       Patient ID: Darrell Hinds Jr. is a 62 y.o. male.    Vitals:  weight is 78.9 kg (174 lb). His temperature is 98.1 °F (36.7 °C). His blood pressure is 154/76 (abnormal) and his pulse is 73. His respiration is 19 and oxygen saturation is 96%.     Chief Complaint: Knee Pain    This is a 62 y.o. male who presents today with a chief complaint of right knee pain started 6 weeks ago, denies any trauma fall or injury, denies numbness or tingling, patient reports it is only 1 particular spot on his knee when he touches that spot he feels the pain, denies fever, body aches or chills, denies cough, wheezing or shortness of breath, denies nausea, vomiting, diarrhea or abdominal pain, denies chest pain or dizziness positional lightheadedness, denies sore throat or trouble swallowing, denies loss of taste or smell, or any other symptoms        Knee Pain   The incident occurred more than 1 week ago. The pain is present in the right knee. The quality of the pain is described as aching, shooting and stabbing. The pain is at a severity of 10/10. The pain is severe. The pain has been Fluctuating since onset. Associated symptoms include an inability to bear weight. Pertinent negatives include no loss of motion, loss of sensation, muscle weakness, numbness or tingling. The symptoms are aggravated by movement. He has tried nothing for the symptoms.     Skin:  Negative for erythema.   Neurological:  Negative for numbness.     Past Medical History:   Diagnosis Date    Cataract     Diabetes mellitus, type 2     Diabetic retinopathy     Encounter for cosmetic surgery     GERD (gastroesophageal reflux disease)     Hyperlipidemia     Hypertension     Hypertrophy of nasal turbinates     Kidney stone 2009    Nasal obstruction     Nasal septal deformity     Unspecified disorder of kidney and ureter        Objective:      Physical Exam   Constitutional: He is oriented to person, place, and time. He appears well-developed.    HENT:   Head: Normocephalic and atraumatic. Head is without abrasion, without contusion and without laceration.   Ears:   Right Ear: External ear normal.   Left Ear: External ear normal.   Nose: Nose normal.   Mouth/Throat: Oropharynx is clear and moist and mucous membranes are normal.   Eyes: Conjunctivae, EOM and lids are normal. Pupils are equal, round, and reactive to light.   Neck: Trachea normal and phonation normal. Neck supple.   Cardiovascular: Normal rate, regular rhythm and normal heart sounds.   Pulmonary/Chest: Effort normal and breath sounds normal. No stridor. No respiratory distress.   Musculoskeletal: Normal range of motion.         General: Normal range of motion.      Right knee: Normal.      Comments: No clicking, popping or crepitus noted  Able to extend knee without difficulty  No erythema, swelling or tenderness noted to right knee  Able to ambulate without assistance   Neurological: He is alert and oriented to person, place, and time.   Skin: Skin is warm, dry, intact and no rash. Capillary refill takes less than 2 seconds. No abrasion, No burn, No bruising, No erythema and No ecchymosis   Psychiatric: His speech is normal and behavior is normal. Judgment and thought content normal.   Nursing note and vitals reviewed.      X-Ray Knee 3 View Right    Result Date: 1/23/2023  EXAMINATION: XR KNEE 3 VIEW RIGHT CLINICAL HISTORY: Pain in right knee TECHNIQUE: AP, lateral, and Merchant views of the right knee were performed. COMPARISON: None FINDINGS: There is slight depressed contour of the medial femoral condyle on the lateral view concerning for possible osteochondral injury in the appropriate clinical setting.  This may be remote.  Note evidence for superimposed joint effusion.  Please note there is partially visualized remote fracture deformity of the right mid fibula and questioned right tibia.  There is no significant genu deformity bilaterally.  There are prominent vascular calcifications  bilaterally.  Mild joint space loss femoral tibial compartments.  Clinical correlation and further evaluation as warranted.     Please see above Electronically signed by: Jose Burger DO Date:    01/23/2023 Time:    15:58     Patient in no acute distress.  Vitals reassuring.  Discussed results/diagnosis/plan in depth with patient in clinic. Strict precautions given to patient to monitor for worsening signs and symptoms. Advised to follow up with primary.All questions answered. Strict ER precautions given. If your symptoms worsens or fail to improve you should go to the Emergency Room. Discharge and follow-up instructions given verbally/printed. Discharge and follow-up instructions discussed with the patient who expressed understanding and willingness to comply with my recommendations.Patient voiced understanding and in agreement with current treatment plan.     Please be advised this text was dictated with DEXMA software and may contain errors due to translation.    Assessment:       1. Acute pain of right knee          Plan:         Acute pain of right knee  -     X-Ray Knee 3 View Right; Future; Expected date: 01/23/2023  -     Ambulatory referral/consult to Orthopedics  -     KNEE BRACE FOR HOME USE           Medical Decision Making:   Clinical Tests:   Radiological Study: Ordered and Reviewed  Urgent Care Management:  Patient in no acute distress.  Vitals reassuring.  On exam, patient is nontoxic appearing and afebrile.  No erythema, swelling or tenderness noted to right knee.  No trauma reported.  Rice advised.  X-ray results discussed with patient in depth.  Patient reported previous injury to his right lower leg when he was 14-year-old and does have history of previous fracture in right leg.  Denies any recent trauma or injury.  However patient reported that he had 1 of the board fell on his right upper leg by his knee.  But does not have any recent history of trauma or fall.  Over-the-counter medications  discussed with patient detail.  Knee brace given in clinic.  Will refer patient to orthopedics for further evaluation.  Referral placed.  I reiterated the importance of further evaluation if no improvement in symptoms and follow-up with specialist. Patient voiced understanding and in agreement with current treatment plan.           Patient Instructions     Rest - Rest the injured area, wear splint for the next week or two as needed for comfort    Ice - Apply ice  to affected area for the first 24-48 hours (DO NOT APPLY ICE DIRECTLY TO THE SKIN.  DO NOT LEAVE ON AFFECTED BODY PART FOR MORE THAN 15 MINUTES AT AT TIME TO AVOID INJURY TO SOFT TISSUE)     Compression - Wear ACE wrap or splint provided for compression and comfort to help reduce pain and swelling    Elevate - Elevated affected area higher than your heart to reduce swelling    -  If you were prescribed an anti-inflammatory, please take as directed as needed for pain and inflammation. If you were not prescribed an anti-inflammatory, you can take Tylenol or ibuprofen over the counter as directed for pain unless you have an allergy to them or medical condition such as stomach ulcers, kidney or liver disease or blood thinners etc for which you should not be taking these type of medications.     Follow up with your PCP in 1 week or as needed if no improvement.      Repeat X ray in 1 week if you still have pain.    If your condition worsens or fails to improve we recommend that you receive another evaluation at the ER immediately or contact your PCP to discuss your concerns or return here.     You must understand that you've received an urgent care treatment only and that you may be released before all your medical problems are known or treated.     You the patient will arrange for followup care as instructed.

## 2023-01-23 NOTE — PROGRESS NOTES
Subjective:       Patient ID: Darrell Hinds Jr. is a 62 y.o. male.    Vitals:  vitals were not taken for this visit.     Chief Complaint: No chief complaint on file.    Pt is complaining of right knee pain. He said when he touches the knee it penetrates pain to the other side.     Knee Pain   The incident occurred more than 1 week ago. The pain is present in the right knee. The quality of the pain is described as aching, shooting and stabbing. The pain is at a severity of 10/10. The pain is severe. The pain has been Fluctuating since onset. Associated symptoms include an inability to bear weight. Pertinent negatives include no loss of motion, loss of sensation, muscle weakness, numbness or tingling. The symptoms are aggravated by movement. He has tried nothing for the symptoms.     Neurological:  Negative for numbness.     Objective:      Physical Exam      Assessment:       No diagnosis found.      Plan:         There are no diagnoses linked to this encounter.

## 2023-01-24 ENCOUNTER — OFFICE VISIT (OUTPATIENT)
Dept: SPORTS MEDICINE | Facility: CLINIC | Age: 63
End: 2023-01-24
Payer: COMMERCIAL

## 2023-01-24 VITALS
SYSTOLIC BLOOD PRESSURE: 143 MMHG | WEIGHT: 170 LBS | HEART RATE: 72 BPM | HEIGHT: 68 IN | DIASTOLIC BLOOD PRESSURE: 77 MMHG | BODY MASS INDEX: 25.76 KG/M2

## 2023-01-24 DIAGNOSIS — M25.561 ACUTE PAIN OF RIGHT KNEE: ICD-10-CM

## 2023-01-24 DIAGNOSIS — M94.261 CHONDROMALACIA OF RIGHT KNEE: Primary | ICD-10-CM

## 2023-01-24 PROCEDURE — 99204 OFFICE O/P NEW MOD 45 MIN: CPT | Mod: 25,S$GLB,, | Performed by: ORTHOPAEDIC SURGERY

## 2023-01-24 PROCEDURE — 3008F PR BODY MASS INDEX (BMI) DOCUMENTED: ICD-10-PCS | Mod: CPTII,S$GLB,, | Performed by: ORTHOPAEDIC SURGERY

## 2023-01-24 PROCEDURE — 1159F PR MEDICATION LIST DOCUMENTED IN MEDICAL RECORD: ICD-10-PCS | Mod: CPTII,S$GLB,, | Performed by: ORTHOPAEDIC SURGERY

## 2023-01-24 PROCEDURE — 3008F BODY MASS INDEX DOCD: CPT | Mod: CPTII,S$GLB,, | Performed by: ORTHOPAEDIC SURGERY

## 2023-01-24 PROCEDURE — 20610 DRAIN/INJ JOINT/BURSA W/O US: CPT | Mod: RT,S$GLB,, | Performed by: ORTHOPAEDIC SURGERY

## 2023-01-24 PROCEDURE — 99204 PR OFFICE/OUTPT VISIT, NEW, LEVL IV, 45-59 MIN: ICD-10-PCS | Mod: 25,S$GLB,, | Performed by: ORTHOPAEDIC SURGERY

## 2023-01-24 PROCEDURE — 3077F PR MOST RECENT SYSTOLIC BLOOD PRESSURE >= 140 MM HG: ICD-10-PCS | Mod: CPTII,S$GLB,, | Performed by: ORTHOPAEDIC SURGERY

## 2023-01-24 PROCEDURE — 3078F DIAST BP <80 MM HG: CPT | Mod: CPTII,S$GLB,, | Performed by: ORTHOPAEDIC SURGERY

## 2023-01-24 PROCEDURE — 1159F MED LIST DOCD IN RCRD: CPT | Mod: CPTII,S$GLB,, | Performed by: ORTHOPAEDIC SURGERY

## 2023-01-24 PROCEDURE — 99999 PR PBB SHADOW E&M-EST. PATIENT-LVL IV: CPT | Mod: PBBFAC,,, | Performed by: ORTHOPAEDIC SURGERY

## 2023-01-24 PROCEDURE — 4010F ACE/ARB THERAPY RXD/TAKEN: CPT | Mod: CPTII,S$GLB,, | Performed by: ORTHOPAEDIC SURGERY

## 2023-01-24 PROCEDURE — 3078F PR MOST RECENT DIASTOLIC BLOOD PRESSURE < 80 MM HG: ICD-10-PCS | Mod: CPTII,S$GLB,, | Performed by: ORTHOPAEDIC SURGERY

## 2023-01-24 PROCEDURE — 3077F SYST BP >= 140 MM HG: CPT | Mod: CPTII,S$GLB,, | Performed by: ORTHOPAEDIC SURGERY

## 2023-01-24 PROCEDURE — 20610 LARGE JOINT ASPIRATION/INJECTION: R KNEE: ICD-10-PCS | Mod: RT,S$GLB,, | Performed by: ORTHOPAEDIC SURGERY

## 2023-01-24 PROCEDURE — 99999 PR PBB SHADOW E&M-EST. PATIENT-LVL IV: ICD-10-PCS | Mod: PBBFAC,,, | Performed by: ORTHOPAEDIC SURGERY

## 2023-01-24 PROCEDURE — 4010F PR ACE/ARB THEARPY RXD/TAKEN: ICD-10-PCS | Mod: CPTII,S$GLB,, | Performed by: ORTHOPAEDIC SURGERY

## 2023-01-24 RX ADMIN — TRIAMCINOLONE ACETONIDE 40 MG: 40 INJECTION, SUSPENSION INTRA-ARTICULAR; INTRAMUSCULAR at 03:01

## 2023-01-24 NOTE — PROGRESS NOTES
CC: Right knee pain    62 y.o. Male who presents as a new patient to me. He works as a contractor. Complaint is right knee pain x 3 weeks. He reports somewhat of a twisting injury to his right knee after a wooden board fell on his leg at work. Pain localizes medial.  Denies swelling or effusions.  Unsure if he has had any mechanical symptoms. Denies instability. Better with rest. Treatment thus far has included rest and activity modifications. Here today to discuss diagnosis and treatment options.  Accompanied by his wife.  No prior pain or problems.    PMHx notable for none.   Negative for tobacco.   Negative for diabetes.     Pain Score:   8    REVIEW OF SYSTEMS:   Constitution: Negative. Negative for chills, fever and night sweats.    Hematologic/Lymphatic: Negative for bleeding problem. Does not bruise/bleed easily.   Skin: Negative for dry skin, itching and rash.   Musculoskeletal: Negative for falls. Positive for right knee pain and muscle weakness.     All other review of symptoms were reviewed and found to be noncontributory.     PAST MEDICAL HISTORY:   Past Medical History:   Diagnosis Date    Cataract     Diabetes mellitus, type 2     Diabetic retinopathy     Encounter for cosmetic surgery     GERD (gastroesophageal reflux disease)     Hyperlipidemia     Hypertension     Hypertrophy of nasal turbinates     Kidney stone 2009    Nasal obstruction     Nasal septal deformity     Unspecified disorder of kidney and ureter      PAST SURGICAL HISTORY:   Past Surgical History:   Procedure Laterality Date    COLONOSCOPY N/A 2/4/2022    Procedure: COLONOSCOPY Suprep covid test 9/5/21;  Surgeon: Miles Amador MD;  Location: [facility];  Service: Endoscopy;  Laterality: N/A;    COSMETIC SURGERY      Rhinoplasty    ESOPHAGOGASTRODUODENOSCOPY N/A 2/4/2022    Procedure: EGD (ESOPHAGOGASTRODUODENOSCOPY);  Surgeon: Miles Amador MD;  Location: [facility];  Service: Endoscopy;  Laterality: N/A;     EXCISION TURBINATE, SUBMUCOUS      HERNIA REPAIR      Umbilical    LEFT HEART CATHETERIZATION N/A 10/5/2021    Procedure: Left heart cath;  Surgeon: Lawson Faulkner MD;  Location: [facility] CATH LAB;  Service: Cardiology;  Laterality: N/A;    NASAL SEPTUM SURGERY       FAMILY HISTORY:   Family History   Problem Relation Age of Onset    Hypertension Mother     Cataracts Mother     Heart disease Father     Diabetes Father     Cataracts Father     Stroke Father     Heart failure Father     Hyperlipidemia Father     Diabetes Brother     Hypertension Brother     Cancer Daughter         leukemia at 13yo    Kidney disease Maternal Grandmother     Heart disease Paternal Grandmother     Heart disease Paternal Grandfather     No Known Problems Maternal Grandfather     No Known Problems Sister     No Known Problems Maternal Aunt     No Known Problems Maternal Uncle     No Known Problems Paternal Aunt     No Known Problems Paternal Uncle     Blindness Neg Hx     Glaucoma Neg Hx     Macular degeneration Neg Hx     Retinal detachment Neg Hx     Acne Neg Hx     Eczema Neg Hx     Lupus Neg Hx     Psoriasis Neg Hx     Melanoma Neg Hx     Anesthesia problems Neg Hx     Amblyopia Neg Hx     Strabismus Neg Hx     Thyroid disease Neg Hx     Heart attack Neg Hx      SOCIAL HISTORY:   Social History     Socioeconomic History    Marital status: Single   Occupational History    Occupation: contractor   Tobacco Use    Smoking status: Never    Smokeless tobacco: Never   Substance and Sexual Activity    Alcohol use: Not Currently     Alcohol/week: 1.0 standard drink     Types: 1 Cans of beer per week     Comment: seldom..maybe once a month    Drug use: No    Sexual activity: Yes     Partners: Female     Social Determinants of Health     Financial Resource Strain: Low Risk     Difficulty of Paying Living Expenses: Not hard at all   Food Insecurity: No Food Insecurity    Worried About Running Out of Food  "in the Last Year: Never true    Ran Out of Food in the Last Year: Never true   Transportation Needs: No Transportation Needs    Lack of Transportation (Medical): No    Lack of Transportation (Non-Medical): No   Physical Activity: Insufficiently Active    Days of Exercise per Week: 4 days    Minutes of Exercise per Session: 30 min   Stress: No Stress Concern Present    Feeling of Stress : Not at all   Social Connections: Unknown    Frequency of Communication with Friends and Family: Three times a week    Frequency of Social Gatherings with Friends and Family: Twice a week    Active Member of Clubs or Organizations: Yes    Attends Club or Organization Meetings: More than 4 times per year    Marital Status: Living with partner   Housing Stability: Low Risk     Unable to Pay for Housing in the Last Year: No    Number of Places Lived in the Last Year: 1    Unstable Housing in the Last Year: No     MEDICATIONS:     Current Outpatient Medications:     aspirin (ECOTRIN) 81 MG EC tablet, Take 81 mg by mouth once daily., Disp: , Rfl:     avanafiL (STENDRA) 200 mg Tab, Take 200 mg by mouth as needed (prn ED)., Disp: 8 tablet, Rfl: 11    BD ULTRA-FINE NAT PEN NEEDLE 32 gauge x 5/32" Ndle, ONCE A DAY, Disp: 100 each, Rfl: 3    ciclopirox (PENLAC) 8 % Soln, Apply topically nightly., Disp: 6.6 mL, Rfl: 11    empagliflozin (JARDIANCE) 25 mg tablet, Take 1 tablet (25 mg total) by mouth once daily., Disp: 30 tablet, Rfl: 11    evolocumab (REPATHA SURECLICK) 140 mg/mL PnIj, Inject 1 mL (140 mg total) into the skin every 30 days., Disp: 1 mL, Rfl: 6    insulin detemir U-100 (LEVEMIR FLEXTOUCH U-100 INSULN) 100 unit/mL (3 mL) InPn pen, Inject 18 Units into the skin every evening., Disp: 9 mL, Rfl: 6    losartan (COZAAR) 50 MG tablet, Take 1 tablet (50 mg total) by mouth once daily., Disp: 30 tablet, Rfl: 6    metFORMIN (GLUCOPHAGE) 1000 MG tablet, Take 1 tablet (1,000 mg total) by mouth 2 (two) times daily., " "Disp: 180 tablet, Rfl: 1    metoprolol succinate (TOPROL-XL) 25 MG 24 hr tablet, TAKE ONE TABLET BY MOUTH EVERY DAY, Disp: 30 tablet, Rfl: 6    pulse oximeter (PULSE OXIMETER) device, by Apply Externally route 2 (two) times a day. Use twice daily at 8 AM and 3 PM and record the value in MyChart as directed., Disp: 1 each, Rfl: 0    SITagliptin (JANUVIA) 100 MG Tab, Take 1 tablet (100 mg total) by mouth once daily., Disp: 90 tablet, Rfl: 3    ezetimibe (ZETIA) 10 mg tablet, Take 1 tablet (10 mg total) by mouth once daily., Disp: 30 tablet, Rfl: 3    famotidine (PEPCID) 20 MG tablet, Take 1 tablet (20 mg total) by mouth 2 (two) times daily., Disp: 20 tablet, Rfl: 0    ALLERGIES:   Review of patient's allergies indicates:  No Known Allergies     PHYSICAL EXAMINATION:  BP (!) 143/77   Pulse 72   Ht 5' 8" (1.727 m)   Wt 77.1 kg (170 lb)   BMI 25.85 kg/m²   General: Well-developed well-nourished 62 y.o. malein no acute distress   Cardiovascular: Regular rhythm by palpation of distal pulse, normal color and temperature, no concerning varicosities on symptomatic side   Lungs: No labored breathing or wheezing appreciated   Neuro: Alert and oriented ×3   Psychiatric: well oriented to person, place and time, demonstrates normal mood and affect   Skin: No rashes, lesions or ulcers, normal temperature, turgor, and texture on involved extremity    Ortho/SPM Exam  Examination of the right knee demonstrates intact extensor mechanism. No effusion. Central patellar tracking.  Mild pain over the patellar facets.  Positive patellar grind.  No patellar apprehension. Normal patellar mobility. Full extension. Flexion to 130. No pain with forced flexion or extension. No tenderness along the medial or lateral joint lines. Negative Fawad's. Negative Lachman. Stable to varus/valgus stress testing at 0 and 30 deg. Negative posterior drawer. Ligamentously stable.    IMAGING:  X-rays including standing, weight bearing AP and flexion " bilateral knees, RIGHT knee lateral and sunrise views ordered and images reviewed by me show:    Mild DJD    ASSESSMENT:      ICD-10-CM ICD-9-CM   1. Chondromalacia of right knee  M94.261 717.7   2. Acute pain of right knee  M25.561 719.46       PLAN:     -Findings and treatment options were discussed with the patient and his wife.  Really not much pain on exam.  I suspect he has some underlying symptomatic chondromalacia.  Possible degenerative type meniscus tear.  Not much of an effusion on exam today.  Discussed conservative treatment measures.  -CSI right knee  -Home PT program for quad and functional strengthening.  Low-impact cardio exercise.  -RTC PRN.  Patient will let us know if he has any continued or recurrent pain of significance, mechanical complaints.  Next step would be MRI.  -All questions answered      Large Joint Aspiration/Injection: R knee    Date/Time: 1/24/2023 3:15 PM  Performed by: ZOHRA Lyles MD  Authorized by: ZOHRA Lyles MD     Consent Done?:  Yes (Verbal)  Indications:  Pain  Site marked: the procedure site was marked    Timeout: prior to procedure the correct patient, procedure, and site was verified    Prep: patient was prepped and draped in usual sterile fashion      Local anesthesia used?: Yes    Local anesthetic:  Co-phenylcaine spray (0.2% Naropin)  Anesthetic total (ml):  4      Details:  Needle Size:  22 G  Ultrasonic Guidance for needle placement?: No    Approach:  Lateral  Location:  Knee  Site:  R knee  Medications:  40 mg triamcinolone acetonide 40 mg/mL  Patient tolerance:  Patient tolerated the procedure well with no immediate complications

## 2023-01-25 RX ORDER — TRIAMCINOLONE ACETONIDE 40 MG/ML
40 INJECTION, SUSPENSION INTRA-ARTICULAR; INTRAMUSCULAR
Status: DISCONTINUED | OUTPATIENT
Start: 2023-01-24 | End: 2023-01-25 | Stop reason: HOSPADM

## 2023-02-08 ENCOUNTER — SPECIALTY PHARMACY (OUTPATIENT)
Dept: PHARMACY | Facility: CLINIC | Age: 63
End: 2023-02-08
Payer: COMMERCIAL

## 2023-02-08 NOTE — TELEPHONE ENCOUNTER
"Specialty Pharmacy - Refill Coordination    Specialty Medication Orders Linked to Encounter      Flowsheet Row Most Recent Value   Medication #1 evolocumab (REPATHA SURECLICK) 140 mg/mL PnIj (Order#[id number], Rx#[id number])            Refill Questions - Documented Responses      Flowsheet Row Most Recent Value   Patient Availability and HIPAA Verification    Does patient want to proceed with activity? Yes   HIPAA/medical authority confirmed? Yes   Relationship to patient of person spoken to? Self   Refill Screening Questions    Would patient like to speak to a pharmacist? No   When does the patient need to receive the medication? 02/15/23   Refill Delivery Questions    How will the patient receive the medication? MEDRx   When does the patient need to receive the medication? 02/15/23   Shipping Address Home   Address in [facility] confirmed and updated if neccessary? Yes   Expected Copay ($) 5   Is the patient able to afford the medication copay? Yes   Payment Method CC on file   Days supply of Refill 60   Supplies needed? No supplies needed   Refill activity completed? Yes   Refill activity plan Refill scheduled   Shipment/Pickup Date: 02/10/23            Current Outpatient Medications   Medication Sig    metFORMIN (GLUCOPHAGE) 1000 MG tablet TAKE ONE TABLET BY MOUTH TWICE DAILY    aspirin (ECOTRIN) 81 MG EC tablet Take 81 mg by mouth once daily.    avanafiL (STENDRA) 200 mg Tab Take 200 mg by mouth as needed (prn ED).    BD ULTRA-FINE NAT PEN NEEDLE 32 gauge x 5/32" Ndle ONCE A DAY    ciclopirox (PENLAC) 8 % Soln Apply topically nightly.    empagliflozin (JARDIANCE) 25 mg tablet Take 1 tablet (25 mg total) by mouth once daily.    evolocumab (REPATHA SURECLICK) 140 mg/mL PnIj Inject 1 mL (140 mg total) into the skin every 30 days.    ezetimibe (ZETIA) 10 mg tablet Take 1 tablet (10 mg total) by mouth once daily.    famotidine (PEPCID) 20 MG tablet Take 1 tablet (20 mg total) by mouth 2 (two) times daily.    " insulin detemir U-100 (LEVEMIR FLEXTOUCH U-100 INSULN) 100 unit/mL (3 mL) InPn pen Inject 18 Units into the skin every evening.    losartan (COZAAR) 50 MG tablet Take 1 tablet (50 mg total) by mouth once daily.    metoprolol succinate (TOPROL-XL) 25 MG 24 hr tablet TAKE ONE TABLET BY MOUTH EVERY DAY    pulse oximeter (PULSE OXIMETER) device by Apply Externally route 2 (two) times a day. Use twice daily at 8 AM and 3 PM and record the value in [facility] as directed.    SITagliptin (JANUVIA) 100 MG Tab Take 1 tablet (100 mg total) by mouth once daily.   Last reviewed on 1/24/2023  3:00 PM by Otilia Ivey MA    Review of patient's allergies indicates:  No Known Allergies Last reviewed on  1/24/2023 2:59 PM by Otilia Rausch      Tasks added this encounter   3/8/2023 - Refill Call (Auto Added)   Tasks due within next 3 months   No tasks due.     [facility]  [address] [place] [id number]  Phone: [phone]  Fax: [phone]

## 2023-04-05 ENCOUNTER — SPECIALTY PHARMACY (OUTPATIENT)
Dept: PHARMACY | Facility: CLINIC | Age: 63
End: 2023-04-05
Payer: COMMERCIAL

## 2023-04-05 NOTE — TELEPHONE ENCOUNTER
Outgoing call: Patient is due to inject on 4/15, New insurance added. I informed that a PA was required and once approved [facility] will follow up. Routing to [facility]

## 2023-04-14 NOTE — TELEPHONE ENCOUNTER
"Specialty Pharmacy - Refill Coordination  Specialty Pharmacy - Medication/Referral Authorization    Specialty Medication Orders Linked to Encounter      Flowsheet Row Most Recent Value   Medication #1 evolocumab (REPATHA SURECLICK) 140 mg/mL PnIj (Order#[id number], Rx#[id number])            Refill Questions - Documented Responses      Flowsheet Row Most Recent Value   Patient Availability and HIPAA Verification    Does patient want to proceed with activity? Yes   HIPAA/medical authority confirmed? Yes   Relationship to patient of person spoken to? Spouse/Significant Other   Refill Screening Questions    Would patient like to speak to a pharmacist? No   When does the patient need to receive the medication? 04/17/23   Refill Delivery Questions    How will the patient receive the medication? MEDRx   When does the patient need to receive the medication? 04/17/23   Shipping Address Home   Address in [facility] confirmed and updated if neccessary? Yes   Expected Copay ($) 4.99   Is the patient able to afford the medication copay? Yes   Payment Method CC on file   Days supply of Refill 30   Supplies needed? No supplies needed   Refill activity completed? Yes   Refill activity plan Refill scheduled   Shipment/Pickup Date: 04/14/23            Current Outpatient Medications   Medication Sig    metFORMIN (GLUCOPHAGE) 1000 MG tablet TAKE ONE TABLET BY MOUTH TWICE DAILY    aspirin (ECOTRIN) 81 MG EC tablet Take 81 mg by mouth once daily.    avanafiL (STENDRA) 200 mg Tab Take 200 mg by mouth as needed (prn ED).    BD ULTRA-FINE NAT PEN NEEDLE 32 gauge x 5/32" Ndle ONCE A DAY    ciclopirox (PENLAC) 8 % Soln Apply topically nightly.    empagliflozin (JARDIANCE) 25 mg tablet Take 1 tablet (25 mg total) by mouth once daily.    evolocumab (REPATHA SURECLICK) 140 mg/mL PnIj Inject 1 mL (140 mg total) into the skin every 30 days.    ezetimibe (ZETIA) 10 mg tablet TAKE ONE TABLET BY MOUTH EVERY DAY    famotidine (PEPCID) 20 MG " tablet Take 1 tablet (20 mg total) by mouth 2 (two) times daily.    insulin detemir U-100 (LEVEMIR FLEXTOUCH U-100 INSULN) 100 unit/mL (3 mL) InPn pen Inject 18 Units into the skin every evening.    losartan (COZAAR) 50 MG tablet Take 1 tablet (50 mg total) by mouth once daily.    metoprolol succinate (TOPROL-XL) 25 MG 24 hr tablet TAKE ONE TABLET BY MOUTH EVERY DAY    pulse oximeter (PULSE OXIMETER) device by Apply Externally route 2 (two) times a day. Use twice daily at 8 AM and 3 PM and record the value in [employer] as directed.    SITagliptin (JANUVIA) 100 MG Tab Take 1 tablet (100 mg total) by mouth once daily.   Last reviewed on 1/24/2023  3:00 PM by Otilia Ivey MA    Review of patient's allergies indicates:  No Known Allergies Last reviewed on  1/24/2023 2:59 PM by Otilia Rausch      Tasks added this encounter   No tasks added.   Tasks due within next 3 months   5/10/2023 - Refill Call (Auto Added)     Shelby Phillips, PharmD  [facility] - Specialty Pharmacy  [address] [place] [id number]  Phone: [phone]  Fax: [phone]

## 2023-05-03 ENCOUNTER — OFFICE VISIT (OUTPATIENT)
Dept: ENDOCRINOLOGY | Facility: CLINIC | Age: 63
End: 2023-05-03
Payer: COMMERCIAL

## 2023-05-03 DIAGNOSIS — I10 HYPERTENSION, ESSENTIAL: ICD-10-CM

## 2023-05-03 DIAGNOSIS — E78.5 HYPERLIPIDEMIA ASSOCIATED WITH TYPE 2 DIABETES MELLITUS: ICD-10-CM

## 2023-05-03 DIAGNOSIS — E11.65 TYPE 2 DIABETES MELLITUS WITH HYPERGLYCEMIA, WITHOUT LONG-TERM CURRENT USE OF INSULIN: Primary | ICD-10-CM

## 2023-05-03 DIAGNOSIS — E11.69 HYPERLIPIDEMIA ASSOCIATED WITH TYPE 2 DIABETES MELLITUS: ICD-10-CM

## 2023-05-03 PROCEDURE — 1160F PR REVIEW ALL MEDS BY PRESCRIBER/CLIN PHARMACIST DOCUMENTED: ICD-10-PCS | Mod: CPTII,95,, | Performed by: GENERAL ACUTE CARE HOSPITAL

## 2023-05-03 PROCEDURE — 1160F RVW MEDS BY RX/DR IN RCRD: CPT | Mod: CPTII,95,, | Performed by: GENERAL ACUTE CARE HOSPITAL

## 2023-05-03 PROCEDURE — 4010F ACE/ARB THERAPY RXD/TAKEN: CPT | Mod: CPTII,95,, | Performed by: GENERAL ACUTE CARE HOSPITAL

## 2023-05-03 PROCEDURE — 99214 OFFICE O/P EST MOD 30 MIN: CPT | Mod: 95,,, | Performed by: GENERAL ACUTE CARE HOSPITAL

## 2023-05-03 PROCEDURE — 99214 PR OFFICE/OUTPT VISIT, EST, LEVL IV, 30-39 MIN: ICD-10-PCS | Mod: 95,,, | Performed by: GENERAL ACUTE CARE HOSPITAL

## 2023-05-03 PROCEDURE — 1159F MED LIST DOCD IN RCRD: CPT | Mod: CPTII,95,, | Performed by: GENERAL ACUTE CARE HOSPITAL

## 2023-05-03 PROCEDURE — 1159F PR MEDICATION LIST DOCUMENTED IN MEDICAL RECORD: ICD-10-PCS | Mod: CPTII,95,, | Performed by: GENERAL ACUTE CARE HOSPITAL

## 2023-05-03 PROCEDURE — 4010F PR ACE/ARB THEARPY RXD/TAKEN: ICD-10-PCS | Mod: CPTII,95,, | Performed by: GENERAL ACUTE CARE HOSPITAL

## 2023-05-03 RX ORDER — EMPAGLIFLOZIN AND METFORMIN HYDROCHLORIDE 12.5; 1 MG/1; MG/1
1 TABLET ORAL 2 TIMES DAILY
Qty: 180 TABLET | Refills: 3 | Status: SHIPPED | OUTPATIENT
Start: 2023-05-03

## 2023-05-03 RX ORDER — BLOOD-GLUCOSE SENSOR
EACH MISCELLANEOUS
Qty: 2 EACH | Refills: 11 | Status: SHIPPED | OUTPATIENT
Start: 2023-05-03 | End: 2023-12-11 | Stop reason: SDUPTHER

## 2023-05-03 NOTE — PROGRESS NOTES
Subjective:      Patient ID: Darrell Hinds Jr. is a 62 y.o. male.    The patient location is: HOME   The chief complaint leading to consultation is: DM2; Follow up     Visit type: audiovisual    Face to Face time with patient: 20 minutes   30 minutes of total time spent on the encounter, which includes face to face time and non-face to face time preparing to see the patient (eg, review of tests), Obtaining and/or reviewing separately obtained history, Documenting clinical information in the electronic or other health record, Independently interpreting results (not separately reported) and communicating results to the patient/family/caregiver, or Care coordination (not separately reported).         Each patient to whom he or she provides medical services by telemedicine is:  (1) informed of the relationship between the physician and patient and the respective role of any other health care provider with respect to management of the patient; and (2) notified that he or she may decline to receive medical services by telemedicine and may withdraw from such care at any time.    Chief Complaint:  DM2; Initial visit     History of Present Illness  61 YO Male w/ dx of DM2, HTN, CAD s/p stent and HLD that presents to the endocrine clinic for follow up.  Pt today reports feels well and denies any complaints.         Interval history:     Pt was last seen on 9/2022 and at that time plan was:     Start Januvia 100mg daily   (Pt did not want to start GLP-1a due to weight loss)      -C/w Levemir to 18 units daily        -C/w Metformin 1g BID      -C/w Jardiance 25mg daily (Side effects of dehydration and euglycemic DKA discussed)         With regards to Diabetes Mellitus:   -Type 2    -Duration:  Dx 20 yrs.  On blood work.    -FH of DM?   Father and Mother DM2,  Brother and sister DM2    -Microvascular complications: (DENIES)   -Macrovascular complications:  (CAD s/p stent)   -DKA?  DENIES   -HHS?  DENIES   -DM Medications:    Metformin 1g BID,  Jardiance 25mg daily and Levemir 18 units  , Januvia 100mg daily     -Previously tried medications:  NONE     -Compliance w/ Meds:   YES     -Hyperglycemia symptoms: DENIES   -Hypoglycemia symptoms:  DENIES     -Know how to correct hypoglycemias:  Yes, glucose tablets          -Glucose monitoring:   Reports glucose log is at goal.  Ran out of Freestyle sensors       -Diet:  B (Egg, howard, toast, coffee no sugar, milk),  L (Chicken, Taco chips, tacos  3 times a week)  D (Baked potatoe, corn, Sushi),  S (keto snacks, crackers) Drinks (Denies)   -Activity:   Walking 30 mins 2-3 weeks   -Pancreatitis?  DENIES   -CHF?  DENIES   -UTIs?  DENIES   -Thyroid CA?  DENIES   -ETOH Abuse?  DENIES     Diabetes Management Status    Statin: Not taking  ACE/ARB: Taking    Screening or Prevention Patient's value Goal Complete/Controlled?   HgA1C Testing and Control   Lab Results   Component Value Date    HGBA1C 8.8 (H) 08/22/2022      Annually/Less than 8% No   Lipid profile : 10/24/2022 Annually Yes   LDL control Lab Results   Component Value Date    LDLCALC 43.2 (L) 10/24/2022    Annually/Less than 100 mg/dl  Yes   Nephropathy screening Lab Results   Component Value Date    LABMICR 9.0 08/22/2022     Lab Results   Component Value Date    PROTEINUA Negative 07/22/2021    Annually Yes   Blood pressure BP Readings from Last 1 Encounters:   01/24/23 (!) 143/77    Less than 140/90 Yes   Dilated retinal exam : 08/17/2022 Annually Yes   Foot exam   : 05/25/2022 Annually Yes        ROS:   As above    Objective:     There were no vitals taken for this visit.  BP Readings from Last 3 Encounters:   01/24/23 (!) 143/77   01/23/23 (!) 154/76   10/31/22 (!) 144/77     Wt Readings from Last 1 Encounters:   01/24/23 1458 77.1 kg (170 lb)     There is no height or weight on file to calculate BMI.      PE (NOT PERFORMED)              Lab Review:   Lab Results   Component Value Date    HGBA1C 8.8 (H) 08/22/2022     Lab Results    Component Value Date    CHOL 105 (L) 10/24/2022    HDL 42 10/24/2022    LDLCALC 43.2 (L) 10/24/2022    TRIG 99 10/24/2022    CHOLHDL 40.0 10/24/2022     Lab Results   Component Value Date     10/24/2022    K 4.9 10/24/2022     10/24/2022    CO2 24 10/24/2022     (H) 10/24/2022    BUN 16 10/24/2022    CREATININE 0.83 10/24/2022    CALCIUM 8.9 10/24/2022    PROT 6.9 10/24/2022    PROT 6.9 10/24/2022    ALBUMIN 4.3 10/24/2022    ALBUMIN 4.3 10/24/2022    BILITOT 0.4 10/24/2022    BILITOT 0.4 10/24/2022    ALKPHOS 58 10/24/2022    ALKPHOS 58 10/24/2022    AST 21 10/24/2022    AST 21 10/24/2022    ALT 16 10/24/2022    ALT 16 10/24/2022    ANIONGAP 11 10/24/2022    ESTGFRAFRICA >60.0 04/25/2022    EGFRNONAA >60.0 04/25/2022    TSH 1.780 09/19/2022     Vit D, 25-Hydroxy   Date Value Ref Range Status   07/22/2021 23 (L) 30 - 96 ng/mL Final     Comment:     Vitamin D deficiency.........<10 ng/mL                              Vitamin D insufficiency......10-29 ng/mL       Vitamin D sufficiency........> or equal to 30 ng/mL  Vitamin D toxicity............>100 ng/mL         Assessment and Plan     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  No, Will give referral today    PLAN:      Due to no recent A1c and self reported glucose level at goal and patient wanting to avoid GLP-1a due to weight loss.  I will recommend the following       Start Synjardy 12.5mg/1,000mg BID     Stop Januvia and Metformin     Start Januvia 100mg daily     -C/w Levemir to 18 units daily    -Keep FS log to send for review in 2 week.     -Labs 1 week prior to  next visit in 3 months    -Send Freestyle jordana 3 sensors       Hyperlipidemia, unspecified hyperlipidemia type  LDL at goal On Statin   Low Fat diet     Hypertension, essential  BP controlled on current BP meds   On ARB  for renal protection   Low Na diet

## 2023-05-10 ENCOUNTER — SPECIALTY PHARMACY (OUTPATIENT)
Dept: PHARMACY | Facility: CLINIC | Age: 63
End: 2023-05-10
Payer: COMMERCIAL

## 2023-05-10 NOTE — TELEPHONE ENCOUNTER
[facility] dispensed a 60 ds on 4/14/23, so patient will not need a refill until June. Confirmed with the patient's wife. Pending call accordingly and routing to [facility].

## 2023-05-22 ENCOUNTER — PATIENT OUTREACH (OUTPATIENT)
Dept: ADMINISTRATIVE | Facility: HOSPITAL | Age: 63
End: 2023-05-22
Payer: COMMERCIAL

## 2023-05-22 ENCOUNTER — PATIENT MESSAGE (OUTPATIENT)
Dept: ADMINISTRATIVE | Facility: HOSPITAL | Age: 63
End: 2023-05-22
Payer: COMMERCIAL

## 2023-06-05 RX ORDER — ALIROCUMAB 150 MG/ML
150 INJECTION, SOLUTION SUBCUTANEOUS
Qty: 150 ML | Refills: 6 | Status: ACTIVE | OUTPATIENT
Start: 2023-06-05 | End: 2024-02-05 | Stop reason: CLARIF

## 2023-06-05 RX ORDER — ALIROCUMAB 150 MG/ML
150 INJECTION, SOLUTION SUBCUTANEOUS
COMMUNITY
End: 2023-06-05 | Stop reason: SDUPTHER

## 2023-06-05 NOTE — TELEPHONE ENCOUNTER
Patient is due for next injection 6/15. New insurance plan prefers Praluent. Staff message sent to [facility] requesting switch if appropriate.

## 2023-06-07 ENCOUNTER — TELEPHONE (OUTPATIENT)
Dept: PHARMACY | Facility: CLINIC | Age: 63
End: 2023-06-07
Payer: MEDICARE

## 2023-06-07 NOTE — TELEPHONE ENCOUNTER
Liliam, this is Karli Vega, clinical pharmacist with [facility] that is part of your care team.  We have begun working on your prescription that your doctor has sent us. Our next steps include:     Working with your insurance company to obtain approval for your medication  Working with you to ensure your medication is affordable     We will be calling you along the way with updates on your medication but if you have any concerns or receive information that you would like to discuss please reach us at [phone].    Welcome call outcome: Patient/caregiver reached

## 2023-06-12 ENCOUNTER — SPECIALTY PHARMACY (OUTPATIENT)
Dept: PHARMACY | Facility: CLINIC | Age: 63
End: 2023-06-12
Payer: MEDICARE

## 2023-06-12 DIAGNOSIS — E11.65 TYPE 2 DIABETES MELLITUS WITH HYPERGLYCEMIA, WITHOUT LONG-TERM CURRENT USE OF INSULIN: Primary | Chronic | ICD-10-CM

## 2023-06-12 RX ORDER — FLASH GLUCOSE SENSOR
KIT MISCELLANEOUS
Qty: 2 KIT | Refills: 1 | Status: SHIPPED | OUTPATIENT
Start: 2023-06-12

## 2023-06-12 NOTE — TELEPHONE ENCOUNTER
"Specialty Pharmacy - Initial Clinical Assessment    Specialty Medication Orders Linked to Encounter      Flowsheet Row Most Recent Value   Medication #1 alirocumab (PRALUENT PEN) 150 mg/mL PnIj (Order#[id number], Rx#[id number])          Patient Diagnosis   E11.69, E78.5 - Hyperlipidemia associated with type 2 diabetes mellitus  I25.10 - Coronary artery disease involving native coronary artery of native heart without angina pectoris  I70.0 - Aortic atherosclerosis    Subjective    Darrell Hinds Jr. is a 62 y.o. male, who is followed by the specialty pharmacy service for management and education.    Recent Encounters       Date Type Provider Description    06/12/2023 Specialty Pharmacy Karli Vega PharmD Initial Clinical Assessment    06/12/2023 Specialty Pharmacy Karli Vega PharmD Referral Authorization    05/10/2023 Specialty Pharmacy Wendy Gomez Refill Coordination    04/05/2023 Specialty Pharmacy Roro Pike Refill Coordination; Referral Authorization    02/08/2023 Specialty Pharmacy Erin De Jesus Refill Coordination            Current Outpatient Medications   Medication Sig    alirocumab (PRALUENT PEN) 150 mg/mL PnIj Inject 1 mL (150 mg total) into the skin every 14 (fourteen) days.    aspirin (ECOTRIN) 81 MG EC tablet Take 81 mg by mouth once daily.    avanafiL (STENDRA) 200 mg Tab Take 200 mg by mouth as needed (prn ED).    BD ULTRA-FINE NAT PEN NEEDLE 32 gauge x 5/32" Ndle ONCE A DAY    blood-glucose sensor (FREESTYLE MICHELLE 3 SENSOR) Pattie Use one sensor every 14 days    ciclopirox (PENLAC) 8 % Soln Apply topically nightly.    empagliflozin-metformin (SYNJARDY) 12.5-1,000 mg Tab Take 1 tablet by mouth 2 (two) times daily.    ezetimibe (ZETIA) 10 mg tablet TAKE ONE TABLET BY MOUTH EVERY DAY    famotidine (PEPCID) 20 MG tablet Take 1 tablet (20 mg total) by mouth 2 (two) times daily.    insulin detemir U-100 (LEVEMIR FLEXTOUCH U-100 INSULN) 100 unit/mL (3 mL) InPn pen Inject 18 Units into the skin " every evening.    losartan (COZAAR) 50 MG tablet Take 1 tablet (50 mg total) by mouth once daily.    metoprolol succinate (TOPROL-XL) 25 MG 24 hr tablet TAKE ONE TABLET BY MOUTH EVERY DAY    pulse oximeter (PULSE OXIMETER) device by Apply Externally route 2 (two) times a day. Use twice daily at 8 AM and 3 PM and record the value in MyChart as directed.    SITagliptin (JANUVIA) 100 MG Tab Take 1 tablet (100 mg total) by mouth once daily.   Last reviewed on 5/3/2023  2:36 PM by Contreras Eden MD    Review of patient's allergies indicates:  No Known AllergiesLast reviewed on  5/3/2023 2:36 PM by Contreras Dunn    Drug Interactions    Clinically relevant drug interactions identified: no           Assessment Questions - Documented Responses      Flowsheet Row Most Recent Value   Assessment    Medication Reconciliation completed for patient No   Goals of Therapy Status Discussed (new start)   Status of the patients ability to self-administer: Is Able   All education points have been covered with patient? No, patient declined- printed education provided   Welcome packet contents reviewed and discussed with patient? No  [switch]   Assesment completed? Yes   Plan Therapy being initiated   Do you need to open a clinical intervention (i-vent)? No   Do you want to schedule first shipment? Yes   Medication #1 Assessment Info    Patient status New medication   Is this medication appropriate for the patient? Yes   Is this medication effective? Not yet started          Refill Questions - Documented Responses      Flowsheet Row Most Recent Value   Patient Availability and HIPAA Verification    Does patient want to proceed with activity? Yes   HIPAA/medical authority confirmed? Yes   Relationship to patient of person spoken to? Care Giver   Refill Screening Questions    When does the patient need to receive the medication? 06/14/23   Refill Delivery Questions    How will the patient receive the medication? MEDRx   When does the  "patient need to receive the medication? 06/14/23   Shipping Address Home   Address in [facility] confirmed and updated if neccessary? Yes   Expected Copay ($) 47   Is the patient able to afford the medication copay? Yes   Payment Method CC on file   Days supply of Refill 28   Supplies needed? No supplies needed   Refill activity completed? Yes   Refill activity plan Refill scheduled   Shipment/Pickup Date: 06/13/23            Objective    He has a past medical history of Cataract, Diabetes mellitus, type 2, Diabetic retinopathy, Encounter for cosmetic surgery, GERD (gastroesophageal reflux disease), Hyperlipidemia, Hypertension, Hypertrophy of nasal turbinates, Kidney stone (2009), Nasal obstruction, Nasal septal deformity, and Unspecified disorder of kidney and ureter.      BP Readings from Last 4 Encounters:   01/24/23 (!) 143/77   01/23/23 (!) 154/76   10/31/22 (!) 144/77   09/02/22 129/72     Ht Readings from Last 4 Encounters:   01/24/23 5' 8" (1.727 m)   10/31/22 5' 8" (1.727 m)   09/02/22 5' 9" (1.753 m)   05/25/22 5' 9" (1.753 m)     Wt Readings from Last 4 Encounters:   01/24/23 77.1 kg (170 lb)   01/23/23 78.9 kg (174 lb)   10/31/22 79.1 kg (174 lb 6.1 oz)   09/02/22 80.7 kg (177 lb 12.8 oz)       The goals of prescribed drug therapy management include:  Supporting patient to meet the prescriber's medical treatment objectives  Improving or maintaining quality of life  Maintaining optimal therapy adherence  Minimizing and managing side effects      Goals of Therapy Status: Discussed (new start)    Assessment/Plan  Patient plans to start therapy on 06/14/23      Indication, dosage, appropriateness, effectiveness, safety and convenience of his specialty medication(s) were reviewed today.     Patient Education   Pharmacist offer to  patient was declined. Printed educational materials will be provided with medication.  Patient did accept verbal education on the following topics:        Tasks added " this encounter   No tasks added.   Tasks due within next 3 months   6/12/2023 - Initial Clinical Assessment/Patient Education (1 Time Occurence)  6/12/2023 - Set up Initial Fill     [facility]  [address]  [facility] [address]  Phone: [phone]  Fax: [phone]

## 2023-06-27 DIAGNOSIS — E78.49 OTHER HYPERLIPIDEMIA: ICD-10-CM

## 2023-06-27 DIAGNOSIS — I70.0 AORTIC ATHEROSCLEROSIS: ICD-10-CM

## 2023-06-27 DIAGNOSIS — Z78.9 STATIN INTOLERANCE: ICD-10-CM

## 2023-06-27 DIAGNOSIS — E11.65 TYPE 2 DIABETES MELLITUS WITH HYPERGLYCEMIA, WITHOUT LONG-TERM CURRENT USE OF INSULIN: Chronic | ICD-10-CM

## 2023-06-27 DIAGNOSIS — I10 ESSENTIAL HYPERTENSION: ICD-10-CM

## 2023-06-27 RX ORDER — LOSARTAN POTASSIUM 25 MG/1
TABLET ORAL
Qty: 90 TABLET | Refills: 0 | Status: SHIPPED | OUTPATIENT
Start: 2023-06-27 | End: 2023-07-17

## 2023-07-05 ENCOUNTER — PATIENT MESSAGE (OUTPATIENT)
Dept: PHARMACY | Facility: CLINIC | Age: 63
End: 2023-07-05
Payer: MEDICARE

## 2023-07-11 ENCOUNTER — PATIENT MESSAGE (OUTPATIENT)
Dept: PHARMACY | Facility: CLINIC | Age: 63
End: 2023-07-11
Payer: MEDICARE

## 2023-07-12 ENCOUNTER — SPECIALTY PHARMACY (OUTPATIENT)
Dept: PHARMACY | Facility: CLINIC | Age: 63
End: 2023-07-12
Payer: MEDICARE

## 2023-07-12 NOTE — TELEPHONE ENCOUNTER
"Specialty Pharmacy - Refill Coordination    Specialty Medication Orders Linked to Encounter      Flowsheet Row Most Recent Value   Medication #1 alirocumab (PRALUENT PEN) 150 mg/mL PnIj (Order#[id number], Rx#[id number])            Refill Questions - Documented Responses      Flowsheet Row Most Recent Value   Patient Availability and HIPAA Verification    Does patient want to proceed with activity? Yes   HIPAA/medical authority confirmed? Yes   Relationship to patient of person spoken to? Self   Refill Screening Questions    Would patient like to speak to a pharmacist? No   When does the patient need to receive the medication? 07/15/23   Refill Delivery Questions    How will the patient receive the medication? MEDRx   When does the patient need to receive the medication? 07/15/23   Shipping Address Home   Address in [facility] confirmed and updated if neccessary? Yes   Expected Copay ($) 47   Is the patient able to afford the medication copay? Yes   Payment Method CC on file   Days supply of Refill 28   Supplies needed? No supplies needed   Refill activity completed? Yes   Refill activity plan Refill scheduled   Shipment/Pickup Date: 07/13/23            Current Outpatient Medications   Medication Sig    alirocumab (PRALUENT PEN) 150 mg/mL PnIj Inject 1 mL (150 mg total) into the skin every 14 (fourteen) days.    aspirin (ECOTRIN) 81 MG EC tablet Take 81 mg by mouth once daily.    avanafiL (STENDRA) 200 mg Tab Take 200 mg by mouth as needed (prn ED).    BD ULTRA-FINE NAT PEN NEEDLE 32 gauge x 5/32" Ndle ONCE A DAY    blood-glucose sensor (FREESTYLE MICHELLE 3 SENSOR) Pattie Use one sensor every 14 days    ciclopirox (PENLAC) 8 % Soln Apply topically nightly.    empagliflozin-metformin (SYNJARDY) 12.5-1,000 mg Tab Take 1 tablet by mouth 2 (two) times daily.    ezetimibe (ZETIA) 10 mg tablet TAKE ONE TABLET BY MOUTH EVERY DAY    famotidine (PEPCID) 20 MG tablet Take 1 tablet (20 mg total) by mouth 2 (two) times " daily.    flash glucose sensor (FREESTYLE MICHELLE 14 DAY SENSOR) Kit APPLY TO SKIN EVERY 14 DAYS AS DIRECTED    insulin detemir U-100 (LEVEMIR FLEXTOUCH U-100 INSULN) 100 unit/mL (3 mL) InPn pen Inject 18 Units into the skin every evening.    losartan (COZAAR) 25 MG tablet Take one tablet by mouth every day    losartan (COZAAR) 50 MG tablet Take 1 tablet (50 mg total) by mouth once daily.    metoprolol succinate (TOPROL-XL) 25 MG 24 hr tablet TAKE ONE TABLET BY MOUTH EVERY DAY    pulse oximeter (PULSE OXIMETER) device by Apply Externally route 2 (two) times a day. Use twice daily at 8 AM and 3 PM and record the value in [employer] as directed.    SITagliptin (JANUVIA) 100 MG Tab Take 1 tablet (100 mg total) by mouth once daily.   Last reviewed on 5/3/2023  2:36 PM by Contreras Eden MD    Review of patient's allergies indicates:  No Known Allergies Last reviewed on  5/3/2023 2:36 PM by Contreras Dunn      Tasks added this encounter   No tasks added.   Tasks due within next 3 months   7/14/2023 - Refill Coordination Outreach (1 time occurrence)     Shelby Hutson, PharmD  [facility]  [address] [place], [place] [address]  [facility] [address]  Phone: [phone]  Fax: [phone]

## 2023-07-17 ENCOUNTER — OFFICE VISIT (OUTPATIENT)
Dept: INTERNAL MEDICINE | Facility: CLINIC | Age: 63
End: 2023-07-17
Payer: MEDICARE

## 2023-07-17 ENCOUNTER — LAB VISIT (OUTPATIENT)
Dept: LAB | Facility: HOSPITAL | Age: 63
End: 2023-07-17
Payer: MEDICARE

## 2023-07-17 VITALS
SYSTOLIC BLOOD PRESSURE: 132 MMHG | TEMPERATURE: 98 F | RESPIRATION RATE: 18 BRPM | HEART RATE: 73 BPM | WEIGHT: 169.75 LBS | OXYGEN SATURATION: 96 % | BODY MASS INDEX: 25.73 KG/M2 | HEIGHT: 68 IN | DIASTOLIC BLOOD PRESSURE: 74 MMHG

## 2023-07-17 DIAGNOSIS — Z79.4 TYPE 2 DIABETES MELLITUS WITH OTHER SPECIFIED COMPLICATION, WITH LONG-TERM CURRENT USE OF INSULIN: ICD-10-CM

## 2023-07-17 DIAGNOSIS — T46.6X5A STATIN MYOPATHY: ICD-10-CM

## 2023-07-17 DIAGNOSIS — Z12.5 ENCOUNTER FOR PROSTATE CANCER SCREENING: ICD-10-CM

## 2023-07-17 DIAGNOSIS — Z00.00 ENCOUNTER FOR ANNUAL HEALTH EXAMINATION: Primary | ICD-10-CM

## 2023-07-17 DIAGNOSIS — E11.69 HYPERLIPIDEMIA ASSOCIATED WITH TYPE 2 DIABETES MELLITUS: ICD-10-CM

## 2023-07-17 DIAGNOSIS — G72.0 STATIN MYOPATHY: ICD-10-CM

## 2023-07-17 DIAGNOSIS — E11.69 TYPE 2 DIABETES MELLITUS WITH OTHER SPECIFIED COMPLICATION, WITH LONG-TERM CURRENT USE OF INSULIN: ICD-10-CM

## 2023-07-17 DIAGNOSIS — E78.5 HYPERLIPIDEMIA ASSOCIATED WITH TYPE 2 DIABETES MELLITUS: ICD-10-CM

## 2023-07-17 DIAGNOSIS — I10 ESSENTIAL HYPERTENSION: ICD-10-CM

## 2023-07-17 DIAGNOSIS — I70.0 AORTIC ATHEROSCLEROSIS: ICD-10-CM

## 2023-07-17 DIAGNOSIS — I25.10 CORONARY ARTERY DISEASE INVOLVING NATIVE CORONARY ARTERY OF NATIVE HEART WITHOUT ANGINA PECTORIS: ICD-10-CM

## 2023-07-17 PROBLEM — R63.4 UNEXPLAINED WEIGHT LOSS: Status: RESOLVED | Noted: 2021-09-22 | Resolved: 2023-07-17

## 2023-07-17 PROBLEM — U07.1 PNEUMONIA DUE TO COVID-19 VIRUS: Status: RESOLVED | Noted: 2022-01-08 | Resolved: 2023-07-17

## 2023-07-17 PROBLEM — J96.01 ACUTE HYPOXEMIC RESPIRATORY FAILURE: Status: RESOLVED | Noted: 2022-01-08 | Resolved: 2023-07-17

## 2023-07-17 PROBLEM — J12.82 PNEUMONIA DUE TO COVID-19 VIRUS: Status: RESOLVED | Noted: 2022-01-08 | Resolved: 2023-07-17

## 2023-07-17 LAB
ALBUMIN SERPL BCP-MCNC: 4.1 G/DL (ref 3.5–5.2)
ALP SERPL-CCNC: 65 U/L (ref 55–135)
ALT SERPL W/O P-5'-P-CCNC: 12 U/L (ref 10–44)
ANION GAP SERPL CALC-SCNC: 10 MMOL/L (ref 8–16)
AST SERPL-CCNC: 15 U/L (ref 10–40)
BASOPHILS # BLD AUTO: 0.03 K/UL (ref 0–0.2)
BASOPHILS NFR BLD: 0.6 % (ref 0–1.9)
BILIRUB SERPL-MCNC: 0.7 MG/DL (ref 0.1–1)
BUN SERPL-MCNC: 16 MG/DL (ref 8–23)
CALCIUM SERPL-MCNC: 9.1 MG/DL (ref 8.7–10.5)
CHLORIDE SERPL-SCNC: 108 MMOL/L (ref 95–110)
CHOLEST SERPL-MCNC: 168 MG/DL (ref 120–199)
CHOLEST/HDLC SERPL: 4.1 {RATIO} (ref 2–5)
CO2 SERPL-SCNC: 21 MMOL/L (ref 23–29)
COMPLEXED PSA SERPL-MCNC: 0.48 NG/ML (ref 0–4)
CREAT SERPL-MCNC: 0.8 MG/DL (ref 0.5–1.4)
DIFFERENTIAL METHOD: NORMAL
EOSINOPHIL # BLD AUTO: 0.1 K/UL (ref 0–0.5)
EOSINOPHIL NFR BLD: 1.2 % (ref 0–8)
ERYTHROCYTE [DISTWIDTH] IN BLOOD BY AUTOMATED COUNT: 13 % (ref 11.5–14.5)
EST. GFR  (NO RACE VARIABLE): >60 ML/MIN/1.73 M^2
ESTIMATED AVG GLUCOSE: 189 MG/DL (ref 68–131)
GLUCOSE SERPL-MCNC: 140 MG/DL (ref 70–110)
HBA1C MFR BLD: 8.2 % (ref 4–5.6)
HCT VFR BLD AUTO: 45.7 % (ref 40–54)
HDLC SERPL-MCNC: 41 MG/DL (ref 40–75)
HDLC SERPL: 24.4 % (ref 20–50)
HGB BLD-MCNC: 15.1 G/DL (ref 14–18)
IMM GRANULOCYTES # BLD AUTO: 0.01 K/UL (ref 0–0.04)
IMM GRANULOCYTES NFR BLD AUTO: 0.2 % (ref 0–0.5)
LDLC SERPL CALC-MCNC: 107.8 MG/DL (ref 63–159)
LYMPHOCYTES # BLD AUTO: 1.5 K/UL (ref 1–4.8)
LYMPHOCYTES NFR BLD: 28.7 % (ref 18–48)
MCH RBC QN AUTO: 30.6 PG (ref 27–31)
MCHC RBC AUTO-ENTMCNC: 33 G/DL (ref 32–36)
MCV RBC AUTO: 93 FL (ref 82–98)
MONOCYTES # BLD AUTO: 0.4 K/UL (ref 0.3–1)
MONOCYTES NFR BLD: 6.9 % (ref 4–15)
NEUTROPHILS # BLD AUTO: 3.2 K/UL (ref 1.8–7.7)
NEUTROPHILS NFR BLD: 62.4 % (ref 38–73)
NONHDLC SERPL-MCNC: 127 MG/DL
NRBC BLD-RTO: 0 /100 WBC
PLATELET # BLD AUTO: 236 K/UL (ref 150–450)
PMV BLD AUTO: 10 FL (ref 9.2–12.9)
POTASSIUM SERPL-SCNC: 4.5 MMOL/L (ref 3.5–5.1)
PROT SERPL-MCNC: 6.7 G/DL (ref 6–8.4)
RBC # BLD AUTO: 4.94 M/UL (ref 4.6–6.2)
SODIUM SERPL-SCNC: 139 MMOL/L (ref 136–145)
TRIGL SERPL-MCNC: 96 MG/DL (ref 30–150)
TSH SERPL DL<=0.005 MIU/L-ACNC: 1.09 UIU/ML (ref 0.4–4)
WBC # BLD AUTO: 5.05 K/UL (ref 3.9–12.7)

## 2023-07-17 PROCEDURE — 80053 COMPREHEN METABOLIC PANEL: CPT | Performed by: NURSE PRACTITIONER

## 2023-07-17 PROCEDURE — 36415 COLL VENOUS BLD VENIPUNCTURE: CPT | Mod: PO | Performed by: NURSE PRACTITIONER

## 2023-07-17 PROCEDURE — 99214 OFFICE O/P EST MOD 30 MIN: CPT | Mod: S$PBB,,, | Performed by: NURSE PRACTITIONER

## 2023-07-17 PROCEDURE — 99999 PR PBB SHADOW E&M-EST. PATIENT-LVL IV: ICD-10-PCS | Mod: PBBFAC,,, | Performed by: NURSE PRACTITIONER

## 2023-07-17 PROCEDURE — 84443 ASSAY THYROID STIM HORMONE: CPT | Performed by: NURSE PRACTITIONER

## 2023-07-17 PROCEDURE — 80061 LIPID PANEL: CPT | Performed by: NURSE PRACTITIONER

## 2023-07-17 PROCEDURE — 99999 PR PBB SHADOW E&M-EST. PATIENT-LVL IV: CPT | Mod: PBBFAC,,, | Performed by: NURSE PRACTITIONER

## 2023-07-17 PROCEDURE — 99214 PR OFFICE/OUTPT VISIT, EST, LEVL IV, 30-39 MIN: ICD-10-PCS | Mod: S$PBB,,, | Performed by: NURSE PRACTITIONER

## 2023-07-17 PROCEDURE — 83036 HEMOGLOBIN GLYCOSYLATED A1C: CPT | Performed by: NURSE PRACTITIONER

## 2023-07-17 PROCEDURE — 99214 OFFICE O/P EST MOD 30 MIN: CPT | Mod: PBBFAC,PO | Performed by: NURSE PRACTITIONER

## 2023-07-17 PROCEDURE — 84153 ASSAY OF PSA TOTAL: CPT | Performed by: NURSE PRACTITIONER

## 2023-07-17 PROCEDURE — 85025 COMPLETE CBC W/AUTO DIFF WBC: CPT | Performed by: NURSE PRACTITIONER

## 2023-07-17 RX ORDER — LOSARTAN POTASSIUM 50 MG/1
50 TABLET ORAL DAILY
Qty: 90 TABLET | Refills: 3 | Status: SHIPPED | OUTPATIENT
Start: 2023-07-17

## 2023-07-17 RX ORDER — METOPROLOL SUCCINATE 25 MG/1
25 TABLET, EXTENDED RELEASE ORAL DAILY
Qty: 90 TABLET | Refills: 3 | Status: SHIPPED | OUTPATIENT
Start: 2023-07-17

## 2023-07-17 RX ORDER — INSULIN DETEMIR 100 [IU]/ML
18 INJECTION, SOLUTION SUBCUTANEOUS NIGHTLY
Qty: 16.2 ML | Refills: 3 | Status: SHIPPED | OUTPATIENT
Start: 2023-07-17 | End: 2023-12-11 | Stop reason: SDUPTHER

## 2023-07-17 NOTE — PROGRESS NOTES
"Subjective:       Patient ID: Darrell Hinds Jr. is a 63 y.o. male.    Chief Complaint: Annual Wellness Visit      Darrell Hinds Jr. is a 63 y.o. male who presents today for an annual wellness visit.     He voices no concerns or complaints.     Review of patient's allergies indicates:  No Known Allergies   Medication List with Changes/Refills   Current Medications    ALIROCUMAB (PRALUENT PEN) 150 MG/ML PNIJ    Inject 1 mL (150 mg total) into the skin every 14 (fourteen) days.    ASPIRIN (ECOTRIN) 81 MG EC TABLET    Take 81 mg by mouth once daily.    AVANAFIL (STENDRA) 200 MG TAB    Take 200 mg by mouth as needed (prn ED).    BD ULTRA-FINE NAT PEN NEEDLE 32 GAUGE X 5/32" NDLE    ONCE A DAY    BLOOD-GLUCOSE SENSOR (FREESTYLE MICHELLE 3 SENSOR) GIOVANNA    Use one sensor every 14 days    EMPAGLIFLOZIN-METFORMIN (SYNJARDY) 12.5-1,000 MG TAB    Take 1 tablet by mouth 2 (two) times daily.    EZETIMIBE (ZETIA) 10 MG TABLET    TAKE ONE TABLET BY MOUTH EVERY DAY    FLASH GLUCOSE SENSOR (FREESTYLE MICHELLE 14 DAY SENSOR) KIT    APPLY TO SKIN EVERY 14 DAYS AS DIRECTED    PULSE OXIMETER (PULSE OXIMETER) DEVICE    by Apply Externally route 2 (two) times a day. Use twice daily at 8 AM and 3 PM and record the value in [facility] as directed.   Changed and/or Refilled Medications    Modified Medication Previous Medication    INSULIN DETEMIR U-100, LEVEMIR, (LEVEMIR FLEXTOUCH U100 INSULIN) 100 UNIT/ML (3 ML) INPN PEN insulin detemir U-100 (LEVEMIR FLEXTOUCH U-100 INSULN) 100 unit/mL (3 mL) InPn pen       Inject 18 Units into the skin every evening.    Inject 18 Units into the skin every evening.    LOSARTAN (COZAAR) 50 MG TABLET losartan (COZAAR) 50 MG tablet       Take 1 tablet (50 mg total) by mouth once daily.    Take 1 tablet (50 mg total) by mouth once daily.    METOPROLOL SUCCINATE (TOPROL-XL) 25 MG 24 HR TABLET metoprolol succinate (TOPROL-XL) 25 MG 24 hr tablet       Take 1 tablet (25 mg total) by mouth once daily.    TAKE ONE TABLET " BY MOUTH EVERY DAY   Discontinued Medications    CICLOPIROX (PENLAC) 8 % SOLN    Apply topically nightly.    FAMOTIDINE (PEPCID) 20 MG TABLET    Take 1 tablet (20 mg total) by mouth 2 (two) times daily.    LOSARTAN (COZAAR) 25 MG TABLET    Take one tablet by mouth every day    SITAGLIPTIN (JANUVIA) 100 MG TAB    Take 1 tablet (100 mg total) by mouth once daily.       Health Maintenance    Colonoscopy: 02/04/2022  (due in 2032)  Pneumonia 23 Vaccine: 12/07/2018  Pneumonia 13 Vaccine: 09/07/2016  Tetanus/Tdap: 02/09/2015  Hepatitis C Screen: 12/05/2016  HIV Screen: Declined   PSA: 07/22/2021  Eye Exam: 08/17/2022  Foot Exam: 07/17/2023   Microalbumin: 08/22/2022      Patient ambulates on his own without an assistive device.     On average, how many days per week do you do moderate to strenuous exercise:  (like a brisk walk or jog; this does not include your job/work)  0   On average, how many minutes do you exercise at this level each day? 0  We encourage you to start exercising if you do not already, continue at your current level or increase your level of activity.     Have you ever been screened for HIV? No  Would you like to be screened for HIV? No    Do you go to the dentist regularly? Yes  When was you last exam:     Do you go to the eye doctor regularly? Yes  When was your last exam:    Do you wear contacts, glasses, reading glasses? Yes    Have you often been bothered by feeling down, depressed, or hopeless?  Not at all    Have you often been bothered by having little interest or pleasure in doing things?  Not at all    Review of Systems   Constitutional:  Negative for chills and fever.   Respiratory:  Negative for cough and shortness of breath.    Cardiovascular:  Negative for chest pain.   Neurological:  Negative for dizziness and headaches.      Objective:     /74 (BP Location: Right arm, Patient Position: Sitting, BP Method: Medium (Manual))   Pulse 73   Temp 97.6 °F (36.4 °C) (Temporal)   Resp 18  "  Ht 5' 8" (1.727 m)   Wt 77 kg (169 lb 12.1 oz)   SpO2 96%   BMI 25.81 kg/m²     Physical Exam  Vitals reviewed.   Constitutional:       Appearance: Normal appearance.   HENT:      Head: Normocephalic and atraumatic.   Cardiovascular:      Rate and Rhythm: Normal rate and regular rhythm.      Heart sounds: Normal heart sounds. No murmur heard.  Pulmonary:      Effort: Pulmonary effort is normal.      Breath sounds: Normal breath sounds. No wheezing.   Skin:     General: Skin is warm and dry.   Neurological:      Mental Status: He is alert and oriented to person, place, and time.     Protective Sensation (w/ 10 gram monofilament):  Right: Decreased  Left: Decreased    Visual Inspection:  Normal -  Bilateral    Pedal Pulses:   Right: Present  Left: Present    BP Readings from Last 3 Encounters:   07/17/23 132/74   01/24/23 (!) 143/77   01/23/23 (!) 154/76     Wt Readings from Last 3 Encounters:   07/17/23 77 kg (169 lb 12.1 oz)   01/24/23 77.1 kg (170 lb)   01/23/23 78.9 kg (174 lb)     I reviewed and independently interpreted the labs and imaging below:  Labs:  Glucose   Date Value Ref Range Status   10/24/2022 193 (H) 70 - 110 mg/dL Final   09/19/2022 194 (H) 70 - 110 mg/dL Final     BUN   Date Value Ref Range Status   10/24/2022 16 2 - 20 mg/dL Final   09/19/2022 18 2 - 20 mg/dL Final     Creatinine   Date Value Ref Range Status   10/24/2022 0.83 0.50 - 1.40 mg/dL Final   09/19/2022 0.91 0.50 - 1.40 mg/dL Final     Cholesterol   Date Value Ref Range Status   10/24/2022 105 (L) 120 - 199 mg/dL Final     Comment:     The National Cholesterol Education Program (NCEP) has set the  following guidelines (reference ranges) for Cholesterol:  Optimal.....................<200 mg/dL  Borderline High.............200-239 mg/dL  High........................> or = 240 mg/dL     09/19/2022 163 120 - 199 mg/dL Final     Comment:     The National Cholesterol Education Program (NCEP) has set the  following guidelines (reference " ranges) for Cholesterol:  Optimal.....................<200 mg/dL  Borderline High.............200-239 mg/dL  High........................> or = 240 mg/dL       Hemoglobin A1C   Date Value Ref Range Status   08/22/2022 8.8 (H) 4.0 - 5.6 % Final     Comment:     ADA Screening Guidelines:  5.7-6.4%  Consistent with prediabetes  >or=6.5%  Consistent with diabetes    High levels of fetal hemoglobin interfere with the HbA1C  assay. Heterozygous hemoglobin variants (HbS, HgC, etc)do  not significantly interfere with this assay.   However, presence of multiple variants may affect accuracy.     04/25/2022 9.1 (H) 4.0 - 5.6 % Final     Comment:     ADA Screening Guidelines:  5.7-6.4%  Consistent with prediabetes  >or=6.5%  Consistent with diabetes    High levels of fetal hemoglobin interfere with the HbA1C  assay. Heterozygous hemoglobin variants (HbS, HgC, etc)do  not significantly interfere with this assay.   However, presence of multiple variants may affect accuracy.       Hemoglobin   Date Value Ref Range Status   01/10/2022 13.5 (L) 14.0 - 18.0 g/dL Final   01/09/2022 13.6 (L) 14.0 - 18.0 g/dL Final     Hematocrit   Date Value Ref Range Status   01/10/2022 39.2 (L) 40.0 - 54.0 % Final   01/09/2022 40.0 40.0 - 54.0 % Final     Vit D, 25-Hydroxy   Date Value Ref Range Status   07/22/2021 23 (L) 30 - 96 ng/mL Final     Comment:     Vitamin D deficiency.........<10 ng/mL                              Vitamin D insufficiency......10-29 ng/mL       Vitamin D sufficiency........> or equal to 30 ng/mL  Vitamin D toxicity............>100 ng/mL     12/30/2019 23 (L) 30 - 96 ng/mL Final     Comment:     Vitamin D deficiency.........<10 ng/mL                              Vitamin D insufficiency......10-29 ng/mL       Vitamin D sufficiency........> or equal to 30 ng/mL  Vitamin D toxicity............>100 ng/mL       Assessment and Plan:     1. Encounter for annual health examination    --Nutrition: Discussed the importance of moderate  caffeine intake. Adhering to a low sodium, low saturated fat/low cholesterol diet.   --Exercise: Discussed the importance of regular exercise. At least 30 minutes 5 days per week.   --Immunizations reviewed.  --Discussed benefits of maintaining up to date health maintenance.    2. Type 2 diabetes mellitus with other specified complication, with long-term current use of insulin    Chronic, stable, continue current medications, follow up with Endo    - insulin detemir U-100, Levemir, (LEVEMIR FLEXTOUCH U100 INSULIN) 100 unit/mL (3 mL) InPn pen; Inject 18 Units into the skin every evening.  Dispense: 16.2 mL; Refill: 3  - Hemoglobin A1C; Future  - Comprehensive Metabolic Panel; Future    3. Essential hypertension    Chronic, stable, continue current medications    - losartan (COZAAR) 50 MG tablet; Take 1 tablet (50 mg total) by mouth once daily.  Dispense: 90 tablet; Refill: 3  - metoprolol succinate (TOPROL-XL) 25 MG 24 hr tablet; Take 1 tablet (25 mg total) by mouth once daily.  Dispense: 90 tablet; Refill: 3  - CBC Auto Differential; Future  - TSH; Future    4. Aortic atherosclerosis  5. Coronary artery disease involving native coronary artery of native heart without angina pectoris  6. Hyperlipidemia associated with type 2 diabetes mellitus    Chronic, stable, continue current medications    - Lipid Panel; Future    7. Statin myopathy    Chronic, stable, continue Praluent and Zetia     8. Encounter for prostate cancer screening    - PSA, Screening; Future

## 2023-08-01 NOTE — PROGRESS NOTES
Subjective:      Patient ID: Darrell Hinds Jr. is a 63 y.o. male.    Chief Complaint:  No chief complaint on file.    History of Present Illness  Darrell Hinds Jr. is here for follow up of DM.  Previously seen by Dr Marks.  Last seen 5/3/2023.  This is their first visit with me.    This is a MyChart video visit.    The patient location is: [place]  The chief complaint leading to consultation is: DM  Visit type: Virtual visit with synchronous audio and video  Total time spent with patient: see below  Each patient to whom he or she provides medical services by telemedicine is:  (1) informed of the relationship between the physician and patient and the respective role of any other health care provider with respect to management of the patient; and (2) notified that he or she may decline to receive medical services by telemedicine and may withdraw from such care at any time.    With regards to diabetes:    Diagnosed: ~2020  DE: 2/2022  Known complications:  DKA Denies  RN Denies  Eye Exam: 8/2022  PN Denies  Podiatry: 5/2022  Nephropathy Denies  CAD +  Denies history of pancreatitis & personal/family history of medullary thyroid cancer.     Current regimen:  Synjardy 12.5-1000 -- 1 tablet twice daily   Levemir 18 units daily     Reports compliance.     Other medications tried:  Januvia - weight loss ~20lbs    Glucose Monitor: Patricia  No backup glucometer  6 times a day testing  Log reviewed: sensor downloaded and reviewed    Hypoglycemia:  Denies  Knows how to correct with 15 grams of carbs- juice, coke, or a peppermint.     Diet/Exercise:  Eats 2-3 meals a day.   Snacks : Denies   Drinks : coffee, water, unsweet tea, Powerade.  Exercise - tries to stay active.   Recent illness, injury, steroids: Denies      Diabetes Management Status    Hemoglobin A1C   Date Value Ref Range Status   07/17/2023 8.2 (H) 4.0 - 5.6 % Final     Comment:     ADA Screening Guidelines:  5.7-6.4%  Consistent with prediabetes  >or=6.5%  Consistent  with diabetes    High levels of fetal hemoglobin interfere with the HbA1C  assay. Heterozygous hemoglobin variants (HbS, HgC, etc)do  not significantly interfere with this assay.   However, presence of multiple variants may affect accuracy.     08/22/2022 8.8 (H) 4.0 - 5.6 % Final     Comment:     ADA Screening Guidelines:  5.7-6.4%  Consistent with prediabetes  >or=6.5%  Consistent with diabetes    High levels of fetal hemoglobin interfere with the HbA1C  assay. Heterozygous hemoglobin variants (HbS, HgC, etc)do  not significantly interfere with this assay.   However, presence of multiple variants may affect accuracy.     04/25/2022 9.1 (H) 4.0 - 5.6 % Final     Comment:     ADA Screening Guidelines:  5.7-6.4%  Consistent with prediabetes  >or=6.5%  Consistent with diabetes    High levels of fetal hemoglobin interfere with the HbA1C  assay. Heterozygous hemoglobin variants (HbS, HgC, etc)do  not significantly interfere with this assay.   However, presence of multiple variants may affect accuracy.         Statin: Not taking  ACE/ARB: Taking  Screening or Prevention Patient's value Goal Complete/Controlled?   HgA1C Testing and Control   Lab Results   Component Value Date    HGBA1C 8.2 (H) 07/17/2023      Annually/Less than 8% No   Lipid profile : 07/17/2023 Annually Yes   LDL control Lab Results   Component Value Date    LDLCALC 107.8 07/17/2023    Annually/Less than 100 mg/dl  No   Nephropathy screening Lab Results   Component Value Date    LABMICR 9.0 08/22/2022     Lab Results   Component Value Date    PROTEINUA Negative 07/22/2021    Annually Yes   Blood pressure BP Readings from Last 1 Encounters:   07/17/23 132/74    Less than 140/90 Yes   Dilated retinal exam : 08/17/2022 Annually Yes   Foot exam   : 07/17/2023 Annually Yes     Review of Systems  As above    There were no vitals taken for this visit.    There is no height or weight on file to calculate BMI.    Lab Review:   Lab Results   Component Value Date     HGBA1C 8.2 (H) 07/17/2023    HGBA1C 8.8 (H) 08/22/2022    HGBA1C 9.1 (H) 04/25/2022       Lab Results   Component Value Date    CHOL 168 07/17/2023    HDL 41 07/17/2023    LDLCALC 107.8 07/17/2023    TRIG 96 07/17/2023    CHOLHDL 24.4 07/17/2023     Lab Results   Component Value Date     07/17/2023    K 4.5 07/17/2023     07/17/2023    CO2 21 (L) 07/17/2023     (H) 07/17/2023    BUN 16 07/17/2023    CREATININE 0.8 07/17/2023    CALCIUM 9.1 07/17/2023    PROT 6.7 07/17/2023    ALBUMIN 4.1 07/17/2023    BILITOT 0.7 07/17/2023    ALKPHOS 65 07/17/2023    AST 15 07/17/2023    ALT 12 07/17/2023    ANIONGAP 10 07/17/2023    ESTGFRAFRICA >60.0 04/25/2022    EGFRNONAA >60.0 04/25/2022    TSH 1.092 07/17/2023     Vit D, 25-Hydroxy   Date Value Ref Range Status   07/22/2021 23 (L) 30 - 96 ng/mL Final     Comment:     Vitamin D deficiency.........<10 ng/mL                              Vitamin D insufficiency......10-29 ng/mL       Vitamin D sufficiency........> or equal to 30 ng/mL  Vitamin D toxicity............>100 ng/mL       Assessment and Plan     1. Type 2 diabetes mellitus with hyperglycemia, without long-term current use of insulin  blood-glucose meter kit    lancets Misc    blood sugar diagnostic Strp    repaglinide (PRANDIN) 0.5 MG tablet    Comprehensive Metabolic Panel    Hemoglobin A1C    Microalbumin/Creatinine Ratio, Urine      2. Coronary artery disease involving native coronary artery of native heart without angina pectoris            Type 2 diabetes mellitus with hyperglycemia  -- Labs prior to follow up.  -- A1c goal <7%.  -- Medications discussed:  MFM   GLP1-DPP4 - does not want to retry due to weight loss  OWENS   SGLT2   Insulin   -- Reviewed logs/CGM:  Postprandial hyperglycemia.  Instructed to send glucose logs in 14 days.  Reach out to me sooner for any glucose <70 or consistently >180.  -- Medication Changes:   Synjardy 12.5-1000 -- 1 tablet twice daily   Levemir 18 units daily    Prandin 0.5mg three times daily before meals   -- Reviewed goals of therapy are to get the best control we can without hypoglycemia.  -- Reviewed patient's current insulin regimen. Clarified proper insulin dose and timing in relation to meals, etc. Insulin injection sites and proper rotation instructed.    -- Advised frequent self blood glucose monitoring.  Patient encouraged to document glucose results and bring them to every clinic visit.  -- Hypoglycemia precautions discussed. Instructed on precautions before driving.    -- Call for Bg repeatedly < 90 or > 180.   -- Close adherence to lifestyle changes recommended.   -- Periodic follow ups for eye evaluations, foot care and dental care suggested.    Coronary artery disease involving native coronary artery of native heart without angina pectoris  -- Optimize glucose control.       Follow up in about 4 months (around 12/9/2023).    I spent 30 minutes face-to-face with the patient, over half of the visit was spent on counseling and/or coordinating the care of the patient.    Counseling includes:  Diagnostic results, impressions, recommendations   Prognosis   Risk and benefits of management/treatment options   Instructions for management treatment and or follow-up   Importance of compliance with management   Risk factor reduction   Patient education

## 2023-08-03 ENCOUNTER — SPECIALTY PHARMACY (OUTPATIENT)
Dept: PHARMACY | Facility: CLINIC | Age: 63
End: 2023-08-03
Payer: MEDICARE

## 2023-08-09 ENCOUNTER — OFFICE VISIT (OUTPATIENT)
Dept: ENDOCRINOLOGY | Facility: CLINIC | Age: 63
End: 2023-08-09
Payer: MEDICARE

## 2023-08-09 DIAGNOSIS — I25.10 CORONARY ARTERY DISEASE INVOLVING NATIVE CORONARY ARTERY OF NATIVE HEART WITHOUT ANGINA PECTORIS: ICD-10-CM

## 2023-08-09 DIAGNOSIS — E11.65 TYPE 2 DIABETES MELLITUS WITH HYPERGLYCEMIA, WITHOUT LONG-TERM CURRENT USE OF INSULIN: Primary | Chronic | ICD-10-CM

## 2023-08-09 PROCEDURE — 99214 OFFICE O/P EST MOD 30 MIN: CPT | Mod: 95,,, | Performed by: NURSE PRACTITIONER

## 2023-08-09 PROCEDURE — 95251 CONT GLUC MNTR ANALYSIS I&R: CPT | Mod: S$PBB,NDTC,, | Performed by: NURSE PRACTITIONER

## 2023-08-09 PROCEDURE — 95251 PR GLUCOSE MONITOR, 72 HOUR, PHYS INTERP: ICD-10-PCS | Mod: S$PBB,NDTC,, | Performed by: NURSE PRACTITIONER

## 2023-08-09 PROCEDURE — 99214 PR OFFICE/OUTPT VISIT, EST, LEVL IV, 30-39 MIN: ICD-10-PCS | Mod: 95,,, | Performed by: NURSE PRACTITIONER

## 2023-08-09 RX ORDER — REPAGLINIDE 0.5 MG/1
0.5 TABLET ORAL
Qty: 270 TABLET | Refills: 3 | Status: SHIPPED | OUTPATIENT
Start: 2023-08-09 | End: 2023-12-11

## 2023-08-09 RX ORDER — INSULIN PUMP SYRINGE, 3 ML
EACH MISCELLANEOUS
Qty: 1 EACH | Refills: 0 | Status: SHIPPED | OUTPATIENT
Start: 2023-08-09

## 2023-08-09 RX ORDER — LANCETS
EACH MISCELLANEOUS
Qty: 200 EACH | Refills: 11 | Status: SHIPPED | OUTPATIENT
Start: 2023-08-09

## 2023-08-09 NOTE — Clinical Note
Upload jordana Virtual visit in 4 months with labs prior Orders Placed This Encounter     Comprehensive Metabolic Panel     Hemoglobin A1C     Microalbumin/Creatinine Ratio, Urine

## 2023-08-09 NOTE — ASSESSMENT & PLAN NOTE
-- Labs prior to follow up.  -- A1c goal <7%.  -- Medications discussed:  MFM   GLP1-DPP4 - does not want to retry due to weight loss  OWENS   SGLT2   Insulin   -- Reviewed logs/CGM:  Postprandial hyperglycemia.  Instructed to send glucose logs in 14 days.  Reach out to me sooner for any glucose <70 or consistently >180.  -- Medication Changes:   Synjardy 12.5-1000 -- 1 tablet twice daily   Levemir 18 units daily   Prandin 0.5mg three times daily before meals   -- Reviewed goals of therapy are to get the best control we can without hypoglycemia.  -- Reviewed patient's current insulin regimen. Clarified proper insulin dose and timing in relation to meals, etc. Insulin injection sites and proper rotation instructed.    -- Advised frequent self blood glucose monitoring.  Patient encouraged to document glucose results and bring them to every clinic visit.  -- Hypoglycemia precautions discussed. Instructed on precautions before driving.    -- Call for Bg repeatedly < 90 or > 180.   -- Close adherence to lifestyle changes recommended.   -- Periodic follow ups for eye evaluations, foot care and dental care suggested.

## 2023-08-21 NOTE — TELEPHONE ENCOUNTER
Update Praluent twice a month.           Previous Messages       ----- Message -----   From: Vanessa Oropeza MD   Sent: 8/21/2023   2:44 PM CDT   To: Nayeli Manzano MA   Subject: RE: Praluent                                     Based on his most recent profile we should increase Praluent to 2 x month and repeat lipids and LFT's in 2 months.

## 2023-08-21 NOTE — TELEPHONE ENCOUNTER
Outgoing call to patient for Praluent refill. Patient takes dose on the 15th of every month. Directions state take every 14 days, but spouse says that provider advised patient to take once monthly. Staff message sent to MDO to confirm proper directions.

## 2023-08-22 NOTE — TELEPHONE ENCOUNTER
"Specialty Pharmacy - Refill Coordination    Specialty Medication Orders Linked to Encounter      Flowsheet Row Most Recent Value   Medication #1 alirocumab (PRALUENT PEN) 150 mg/mL PnIj (Order#[id number], Rx#[id number])            Refill Questions - Documented Responses      Flowsheet Row Most Recent Value   Patient Availability and HIPAA Verification    Does patient want to proceed with activity? Yes   HIPAA/medical authority confirmed? Yes   Relationship to patient of person spoken to? Self   Refill Screening Questions    Would patient like to speak to a pharmacist? No   When does the patient need to receive the medication? 08/30/23   Refill Delivery Questions    How will the patient receive the medication? [facility]   When does the patient need to receive the medication? 08/30/23   Shipping Address Home   Address in [facility] confirmed and updated if neccessary? Yes   Expected Copay ($) 132.85   Is the patient able to afford the medication copay? Yes   Payment Method CC on file   Days supply of Refill 28   Supplies needed? No supplies needed   Refill activity completed? Yes   Refill activity plan Refill scheduled   Shipment/Pickup Date: 08/28/23            Current Outpatient Medications   Medication Sig    alirocumab (PRALUENT PEN) 150 mg/mL PnIj Inject 1 mL (150 mg total) into the skin every 14 (fourteen) days.    aspirin (ECOTRIN) 81 MG EC tablet Take 81 mg by mouth once daily.    avanafiL (STENDRA) 200 mg Tab Take 200 mg by mouth as needed (prn ED).    BD ULTRA-FINE NAT PEN NEEDLE 32 gauge x 5/32" Ndle ONCE A DAY    blood sugar diagnostic Strp To check BG 2 times daily, to use with insurance preferred meter    blood-glucose meter kit To check BG 2 times daily, to use with insurance preferred meter    blood-glucose sensor (FREESTYLE MICHELLE 3 SENSOR) Pattie Use one sensor every 14 days    empagliflozin-metformin (SYNJARDY) 12.5-1,000 mg Tab Take 1 tablet by mouth 2 (two) times daily.    ezetimibe (ZETIA) 10 mg " tablet TAKE ONE TABLET BY MOUTH EVERY DAY    flash glucose sensor (FREESTYLE MICHELLE 14 DAY SENSOR) Kit APPLY TO SKIN EVERY 14 DAYS AS DIRECTED    insulin detemir U-100, Levemir, (LEVEMIR FLEXTOUCH U100 INSULIN) 100 unit/mL (3 mL) InPn pen Inject 18 Units into the skin every evening.    lancets Misc To check BG 2 times daily, to use with insurance preferred meter    losartan (COZAAR) 50 MG tablet Take 1 tablet (50 mg total) by mouth once daily.    metoprolol succinate (TOPROL-XL) 25 MG 24 hr tablet Take 1 tablet (25 mg total) by mouth once daily.    pulse oximeter (PULSE OXIMETER) device by Apply Externally route 2 (two) times a day. Use twice daily at 8 AM and 3 PM and record the value in MyChart as directed.    repaglinide (PRANDIN) 0.5 MG tablet Take 1 tablet (0.5 mg total) by mouth 3 (three) times daily before meals.   Last reviewed on 8/9/2023  8:03 AM by Cate Ellis, NP    Review of patient's allergies indicates:  No Known Allergies Last reviewed on  8/9/2023 8:03 AM by Cate Ellis      Tasks added this encounter   No tasks added.   Tasks due within next 3 months   8/22/2023 - Refill Coordination Outreach (1 time occurrence)     Karli Vega, PharmD  [facility]  [address] [place] [id number]  Phone: [phone]  Fax: [phone]

## 2023-08-22 NOTE — TELEPHONE ENCOUNTER
Outgoing call to patient and wife to inform them that provider stated Praluent is to be taken every two weeks. They verbalized understanding and set up refill.

## 2023-08-24 ENCOUNTER — TELEPHONE (OUTPATIENT)
Dept: ENDOCRINOLOGY | Facility: CLINIC | Age: 63
End: 2023-08-24
Payer: MEDICARE

## 2023-10-08 NOTE — HOSPITAL COURSE
73 Patient admitted with COVID-19 viral pneumonia and acute hypoxic respiratory failure - he was started on treatment with dexamethasone and remdesivir with significant improvement. Patient sated that overall he feels better than yesterday with improving shortness of breath and chest discomfort.  Does report that he had 1 black stool yesterday.  Denies history of prior bleeding and no diarrhea. Low suspicion for GIB and now further dark stools, patient improved to room air and was discharged in stable condition with close outpatient follow up.

## 2023-12-05 ENCOUNTER — OFFICE VISIT (OUTPATIENT)
Dept: URGENT CARE | Facility: CLINIC | Age: 63
End: 2023-12-05
Payer: MEDICARE

## 2023-12-05 VITALS
HEIGHT: 68 IN | BODY MASS INDEX: 25.73 KG/M2 | RESPIRATION RATE: 20 BRPM | OXYGEN SATURATION: 97 % | TEMPERATURE: 98 F | SYSTOLIC BLOOD PRESSURE: 168 MMHG | DIASTOLIC BLOOD PRESSURE: 77 MMHG | HEART RATE: 65 BPM | WEIGHT: 169.75 LBS

## 2023-12-05 DIAGNOSIS — L03.115 CELLULITIS OF RIGHT LOWER EXTREMITY: Primary | ICD-10-CM

## 2023-12-05 DIAGNOSIS — R22.9 LOCALIZED SOFT TISSUE SWELLING: ICD-10-CM

## 2023-12-05 PROCEDURE — 99213 PR OFFICE/OUTPT VISIT, EST, LEVL III, 20-29 MIN: ICD-10-PCS | Mod: S$GLB,,, | Performed by: FAMILY MEDICINE

## 2023-12-05 PROCEDURE — 99213 OFFICE O/P EST LOW 20 MIN: CPT | Mod: S$GLB,,, | Performed by: FAMILY MEDICINE

## 2023-12-05 RX ORDER — SULFAMETHOXAZOLE AND TRIMETHOPRIM 800; 160 MG/1; MG/1
1 TABLET ORAL 2 TIMES DAILY
Qty: 10 TABLET | Refills: 0 | Status: SHIPPED | OUTPATIENT
Start: 2023-12-05 | End: 2023-12-10

## 2023-12-05 NOTE — PROGRESS NOTES
"Subjective:      Patient ID: Darrell Hinds Jr. is a 63 y.o. male.    Vitals:  height is 5' 8" (1.727 m) and weight is 77 kg (169 lb 12.1 oz). His oral temperature is 98 °F (36.7 °C). His blood pressure is 168/77 (abnormal) and his pulse is 65. His respiration is 20 and oxygen saturation is 97%.     Chief Complaint: Leg Pain    Pt present to clinic with leg injury. Reports hit his leg with object 1 week ago, affected area is swollen. Treatment include tylenol with mild relief.     Leg Pain   The incident occurred more than 1 week ago. The incident occurred at home. The pain is present in the right leg. The quality of the pain is described as aching. The pain is at a severity of 4/10. The pain is mild. The pain has been Constant since onset. It is unknown if a foreign body is present. The symptoms are aggravated by movement. He has tried acetaminophen and NSAIDs for the symptoms. The treatment provided mild relief.     ROS   Objective:     Physical Exam   Constitutional: He is oriented to person, place, and time. He appears well-developed. He is cooperative.   HENT:   Head: Normocephalic and atraumatic.   Ears:   Right Ear: Hearing, tympanic membrane, external ear and ear canal normal.   Left Ear: Hearing, tympanic membrane, external ear and ear canal normal.   Nose: Nose normal. No mucosal edema or nasal deformity. No epistaxis. Right sinus exhibits no maxillary sinus tenderness and no frontal sinus tenderness. Left sinus exhibits no maxillary sinus tenderness and no frontal sinus tenderness.   Mouth/Throat: Uvula is midline, oropharynx is clear and moist and mucous membranes are normal. No trismus in the jaw. Normal dentition. No uvula swelling.   Eyes: Conjunctivae and lids are normal.   Neck: Trachea normal and phonation normal. Neck supple.   Cardiovascular: Normal rate, regular rhythm, normal heart sounds and normal pulses.   Pulmonary/Chest: Effort normal and breath sounds normal.   Abdominal: Normal " appearance and bowel sounds are normal. Soft.   Musculoskeletal: Normal range of motion.         General: Normal range of motion.   Neurological: He is alert and oriented to person, place, and time. He exhibits normal muscle tone.   Skin: Skin is warm, dry and intact.        Psychiatric: His speech is normal and behavior is normal. Judgment and thought content normal.   Nursing note and vitals reviewed.      Assessment:     1. Cellulitis of right lower extremity    2. Localized soft tissue swelling        Plan:       Cellulitis of right lower extremity  -     sulfamethoxazole-trimethoprim 800-160mg (BACTRIM DS) 800-160 mg Tab; Take 1 tablet by mouth 2 (two) times daily. for 5 days  Dispense: 10 tablet; Refill: 0    Localized soft tissue swelling    -Ice and elevation to help decrease swelling   Thank you for choosing [facility]!     Our goal in the Urgent Care is to always provide outstanding medical care. You may receive a survey by mail or e-mail in the next week regarding your experience today. We would greatly appreciate you completing and returning the survey. Your feedback provides us with a way to recognize our staff who provide very good care, and it helps us learn how to improve when your experience was below our aspiration of excellence.       We appreciate you trusting us with your medical care. We hope you feel better soon. We will be happy to take care of you for all of your future medical needs.  You must understand that you've received an Urgent Care treatment only and that you may be released before all your medical problems are known or treated. You, the patient, will arrange for follow up care as instructed.  Follow up with your PCP or specialty clinic as directed in the next 1-2 weeks if not improved or as needed.  You can call [phone] to schedule an appointment with the appropriate provider.  Another option is to follow up with [facility]  ([email or web address]) virtually for quick simple medical advice.  If your condition worsens we recommend that you receive another evaluation at the emergency room immediately or contact your primary medical clinics after hours call service to discuss your concerns.  Please return here or go to the Emergency Department for any concerns or worsening of condition.      *If you were prescribed a narcotic or controlled medication, do not drive or operate heavy equipment or machinery while taking these medications.

## 2023-12-07 NOTE — PROGRESS NOTES
Subjective:      Patient ID: Darrell Hinds Jr. is a 63 y.o. male.    Chief Complaint:  No chief complaint on file.    History of Present Illness  Darrell Hinds Jr. is here for follow up of DM.  Previously seen by me 8/2023.  This is a MyChart video visit.    The patient location is: [place]  The chief complaint leading to consultation is: DM  Visit type: Virtual visit with synchronous audio and video  Total time spent with patient: see below  Each patient to whom he or she provides medical services by telemedicine is:  (1) informed of the relationship between the physician and patient and the respective role of any other health care provider with respect to management of the patient; and (2) notified that he or she may decline to receive medical services by telemedicine and may withdraw from such care at any time.    With regards to Diabetes:    Diagnosed: ~2020  DE: 2/2022  Known complications:  DKA Denies  RN Denies  Eye Exam: 8/2022  PN Denies  Podiatry: 5/2022  Nephropathy Denies  CAD +  Denies history of pancreatitis & personal/family history of medullary thyroid cancer.     Diet/Exercise:  Eats 2-3 meals a day.   Snacks : Denies   Drinks : coffee, water, unsweet tea, Powerade.  Exercise - tries to stay active.   Recent illness, injury, steroids: Denies    Current regimen:  Synjardy 12.5-1000 -- 1 tablet twice daily   Levemir 20 units daily   Prandin 0.5mg three times daily before meals     Missing Prandin fairly often.     Other medications tried:  Januvia - weight loss ~20lbs    Glucose Monitor: Patricia  No backup glucometer  6 times a day testing  Log reviewed: sensor downloaded and reviewed    Hypoglycemia:  Denies  Knows how to correct with 15 grams of carbs- juice, coke, or a peppermint.     Diabetes Management Status    Hemoglobin A1C   Date Value Ref Range Status   12/04/2023 7.4 (H) 4.0 - 5.6 % Final     Comment:     ADA Screening Guidelines:  5.7-6.4%  Consistent with prediabetes  >or=6.5%  Consistent  with diabetes    High levels of fetal hemoglobin interfere with the HbA1C  assay. Heterozygous hemoglobin variants (HbS, HgC, etc)do  not significantly interfere with this assay.   However, presence of multiple variants may affect accuracy.     07/17/2023 8.2 (H) 4.0 - 5.6 % Final     Comment:     ADA Screening Guidelines:  5.7-6.4%  Consistent with prediabetes  >or=6.5%  Consistent with diabetes    High levels of fetal hemoglobin interfere with the HbA1C  assay. Heterozygous hemoglobin variants (HbS, HgC, etc)do  not significantly interfere with this assay.   However, presence of multiple variants may affect accuracy.     08/22/2022 8.8 (H) 4.0 - 5.6 % Final     Comment:     ADA Screening Guidelines:  5.7-6.4%  Consistent with prediabetes  >or=6.5%  Consistent with diabetes    High levels of fetal hemoglobin interfere with the HbA1C  assay. Heterozygous hemoglobin variants (HbS, HgC, etc)do  not significantly interfere with this assay.   However, presence of multiple variants may affect accuracy.         Statin: Not taking  ACE/ARB: Taking  Screening or Prevention Patient's value Goal Complete/Controlled?   HgA1C Testing and Control   Lab Results   Component Value Date    HGBA1C 7.4 (H) 12/04/2023      Annually/Less than 8% No   Lipid profile : 07/17/2023 Annually Yes   LDL control Lab Results   Component Value Date    LDLCALC 107.8 07/17/2023    Annually/Less than 100 mg/dl  No   Nephropathy screening Lab Results   Component Value Date    LABMICR 10.0 12/04/2023     Lab Results   Component Value Date    PROTEINUA Negative 07/22/2021    Annually Yes   Blood pressure BP Readings from Last 1 Encounters:   12/05/23 (!) 168/77    Less than 140/90 Yes   Dilated retinal exam : 08/17/2022 Annually Yes   Foot exam   : 07/17/2023 Annually Yes     Review of Systems  As above    There were no vitals taken for this visit.    There is no height or weight on file to calculate BMI.    Lab Review:   Lab Results   Component Value  Date    HGBA1C 7.4 (H) 12/04/2023    HGBA1C 8.2 (H) 07/17/2023    HGBA1C 8.8 (H) 08/22/2022       Lab Results   Component Value Date    CHOL 168 07/17/2023    HDL 41 07/17/2023    LDLCALC 107.8 07/17/2023    TRIG 96 07/17/2023    CHOLHDL 24.4 07/17/2023     Lab Results   Component Value Date     12/04/2023    K 5.2 (H) 12/04/2023     12/04/2023    CO2 23 12/04/2023     12/04/2023    BUN 19 12/04/2023    CREATININE 0.82 12/04/2023    CALCIUM 9.6 12/04/2023    PROT 7.0 12/04/2023    ALBUMIN 4.2 12/04/2023    BILITOT 0.5 12/04/2023    ALKPHOS 63 12/04/2023    AST 24 12/04/2023    ALT 17 12/04/2023    ANIONGAP 13 12/04/2023    ESTGFRAFRICA >60.0 04/25/2022    EGFRNONAA >60.0 04/25/2022    TSH 1.092 07/17/2023     Vit D, 25-Hydroxy   Date Value Ref Range Status   07/22/2021 23 (L) 30 - 96 ng/mL Final     Comment:     Vitamin D deficiency.........<10 ng/mL                              Vitamin D insufficiency......10-29 ng/mL       Vitamin D sufficiency........> or equal to 30 ng/mL  Vitamin D toxicity............>100 ng/mL       Assessment and Plan     1. Type 2 diabetes mellitus with hyperglycemia, without long-term current use of insulin  repaglinide (PRANDIN) 1 MG tablet    blood-glucose sensor (FREESTYLE MICHELLE 3 SENSOR) Pattie    insulin detemir U-100, Levemir, (LEVEMIR FLEXTOUCH U100 INSULIN) 100 unit/mL (3 mL) InPn pen    Hemoglobin A1C    Comprehensive Metabolic Panel      2. Type 2 diabetes mellitus with other specified complication, with long-term current use of insulin            Type 2 diabetes mellitus with hyperglycemia  -- Labs prior to follow up.  -- A1c goal <7%.  -- Medications discussed:  MFM   GLP1-DPP4 - does not want to retry due to weight loss  OWENS   SGLT2   Insulin   -- Reviewed logs/CGM:  Postprandial hyperglycemia.  Instructed to send glucose logs in 14 days.  Reach out to me sooner for any glucose <70 or consistently >180.  -- Medication Changes:   Synjardy 12.5-1000 -- 1 tablet  twice daily   Levemir 20 units daily   Prandin 1 mg three times daily before meals   Encouraged compliance.   -- Reviewed goals of therapy are to get the best control we can without hypoglycemia.  -- Reviewed patient's current insulin regimen. Clarified proper insulin dose and timing in relation to meals, etc. Insulin injection sites and proper rotation instructed.    -- Advised frequent self blood glucose monitoring.  Patient encouraged to document glucose results and bring them to every clinic visit.  -- Hypoglycemia precautions discussed. Instructed on precautions before driving.    -- Call for Bg repeatedly < 90 or > 180.   -- Close adherence to lifestyle changes recommended.   -- Periodic follow ups for eye evaluations, foot care and dental care suggested.        Follow up in about 4 months (around 4/11/2024).    I spent 30 minutes face-to-face with the patient, over half of the visit was spent on counseling and/or coordinating the care of the patient.    Counseling includes:  Diagnostic results, impressions, recommendations   Prognosis   Risk and benefits of management/treatment options   Instructions for management treatment and or follow-up   Importance of compliance with management   Risk factor reduction   Patient education

## 2023-12-11 ENCOUNTER — OFFICE VISIT (OUTPATIENT)
Dept: ENDOCRINOLOGY | Facility: CLINIC | Age: 63
End: 2023-12-11
Payer: MEDICARE

## 2023-12-11 DIAGNOSIS — Z79.4 TYPE 2 DIABETES MELLITUS WITH OTHER SPECIFIED COMPLICATION, WITH LONG-TERM CURRENT USE OF INSULIN: ICD-10-CM

## 2023-12-11 DIAGNOSIS — E11.65 TYPE 2 DIABETES MELLITUS WITH HYPERGLYCEMIA, WITHOUT LONG-TERM CURRENT USE OF INSULIN: Primary | Chronic | ICD-10-CM

## 2023-12-11 DIAGNOSIS — E11.69 TYPE 2 DIABETES MELLITUS WITH OTHER SPECIFIED COMPLICATION, WITH LONG-TERM CURRENT USE OF INSULIN: ICD-10-CM

## 2023-12-11 DIAGNOSIS — E11.65 TYPE 2 DIABETES MELLITUS WITH HYPERGLYCEMIA, WITHOUT LONG-TERM CURRENT USE OF INSULIN: Chronic | ICD-10-CM

## 2023-12-11 PROCEDURE — 99214 PR OFFICE/OUTPT VISIT, EST, LEVL IV, 30-39 MIN: ICD-10-PCS | Mod: 25,95,, | Performed by: NURSE PRACTITIONER

## 2023-12-11 PROCEDURE — 95251 PR GLUCOSE MONITOR, 72 HOUR, PHYS INTERP: ICD-10-PCS | Mod: S$PBB,NDTC,, | Performed by: NURSE PRACTITIONER

## 2023-12-11 PROCEDURE — 99214 OFFICE O/P EST MOD 30 MIN: CPT | Mod: 25,95,, | Performed by: NURSE PRACTITIONER

## 2023-12-11 PROCEDURE — 95251 CONT GLUC MNTR ANALYSIS I&R: CPT | Mod: S$PBB,NDTC,, | Performed by: NURSE PRACTITIONER

## 2023-12-11 RX ORDER — BLOOD-GLUCOSE SENSOR
EACH MISCELLANEOUS
Qty: 6 EACH | Refills: 3 | Status: SHIPPED | OUTPATIENT
Start: 2023-12-11 | End: 2023-12-13 | Stop reason: SDUPTHER

## 2023-12-11 RX ORDER — REPAGLINIDE 1 MG/1
1 TABLET ORAL
Qty: 270 TABLET | Refills: 3 | Status: SHIPPED | OUTPATIENT
Start: 2023-12-11 | End: 2024-12-10

## 2023-12-11 RX ORDER — INSULIN DETEMIR 100 [IU]/ML
20 INJECTION, SOLUTION SUBCUTANEOUS DAILY
Qty: 18 ML | Refills: 3 | Status: SHIPPED | OUTPATIENT
Start: 2023-12-11 | End: 2024-12-10

## 2023-12-11 RX ORDER — BLOOD-GLUCOSE SENSOR
EACH MISCELLANEOUS
Qty: 6 EACH | Refills: 3 | Status: SHIPPED | OUTPATIENT
Start: 2023-12-11 | End: 2023-12-11

## 2023-12-11 NOTE — ASSESSMENT & PLAN NOTE
-- Labs prior to follow up.  -- A1c goal <7%.  -- Medications discussed:  MFM   GLP1-DPP4 - does not want to retry due to weight loss  OWENS   SGLT2   Insulin   -- Reviewed logs/CGM:  Postprandial hyperglycemia.  Instructed to send glucose logs in 14 days.  Reach out to me sooner for any glucose <70 or consistently >180.  -- Medication Changes:   Synjardy 12.5-1000 -- 1 tablet twice daily   Levemir 20 units daily   Prandin 1 mg three times daily before meals   Encouraged compliance.   -- Reviewed goals of therapy are to get the best control we can without hypoglycemia.  -- Reviewed patient's current insulin regimen. Clarified proper insulin dose and timing in relation to meals, etc. Insulin injection sites and proper rotation instructed.    -- Advised frequent self blood glucose monitoring.  Patient encouraged to document glucose results and bring them to every clinic visit.  -- Hypoglycemia precautions discussed. Instructed on precautions before driving.    -- Call for Bg repeatedly < 90 or > 180.   -- Close adherence to lifestyle changes recommended.   -- Periodic follow ups for eye evaluations, foot care and dental care suggested.

## 2023-12-11 NOTE — Clinical Note
Upload Patricia VV in 4 months with labs prior Orders Placed This Encounter     Hemoglobin A1C     Comprehensive Metabolic Panel

## 2023-12-13 ENCOUNTER — TELEPHONE (OUTPATIENT)
Dept: CARDIOLOGY | Facility: CLINIC | Age: 63
End: 2023-12-13
Payer: MEDICARE

## 2023-12-13 DIAGNOSIS — E11.69 HYPERLIPIDEMIA ASSOCIATED WITH TYPE 2 DIABETES MELLITUS: Primary | ICD-10-CM

## 2023-12-13 DIAGNOSIS — I25.10 CORONARY ARTERY DISEASE INVOLVING NATIVE CORONARY ARTERY OF NATIVE HEART WITHOUT ANGINA PECTORIS: ICD-10-CM

## 2023-12-13 DIAGNOSIS — E11.65 TYPE 2 DIABETES MELLITUS WITH HYPERGLYCEMIA, WITHOUT LONG-TERM CURRENT USE OF INSULIN: Chronic | ICD-10-CM

## 2023-12-13 DIAGNOSIS — E78.5 HYPERLIPIDEMIA ASSOCIATED WITH TYPE 2 DIABETES MELLITUS: Primary | ICD-10-CM

## 2023-12-13 DIAGNOSIS — E11.65 TYPE 2 DIABETES MELLITUS WITH HYPERGLYCEMIA, WITHOUT LONG-TERM CURRENT USE OF INSULIN: ICD-10-CM

## 2023-12-13 RX ORDER — BLOOD-GLUCOSE SENSOR
EACH MISCELLANEOUS
Qty: 6 EACH | Refills: 3 | Status: SHIPPED | OUTPATIENT
Start: 2023-12-13 | End: 2024-02-05 | Stop reason: SDUPTHER

## 2024-01-10 DIAGNOSIS — E11.65 TYPE 2 DIABETES MELLITUS WITH HYPERGLYCEMIA, WITHOUT LONG-TERM CURRENT USE OF INSULIN: Chronic | ICD-10-CM

## 2024-01-10 RX ORDER — FLASH GLUCOSE SENSOR
KIT MISCELLANEOUS
Qty: 2 KIT | Refills: 1 | Status: CANCELLED | OUTPATIENT
Start: 2024-01-10

## 2024-01-11 RX ORDER — FLASH GLUCOSE SENSOR
KIT MISCELLANEOUS
Qty: 2 KIT | Refills: 11 | OUTPATIENT
Start: 2024-01-11

## 2024-01-11 NOTE — TELEPHONE ENCOUNTER
I called pt's home and spoke to Lizet, Rx was sent to the wrong provider.  She was advised to send a message to [facility]'s staff for assistance.  She verbalized understanding

## 2024-01-11 NOTE — TELEPHONE ENCOUNTER
Let patient know - I haven't seen him for OV since 2021 - looks like he began seeing Cate Ellis, endocrine NP at [facility]. I forwarded the request to Cate.   But I'm happy to add him on for an OV in the future, or he can continue to follow with Cate.   Looks like he had some recent labs already - so wouldn't need more.     It is up to him!     Thanks,  Nicolasa

## 2024-02-05 DIAGNOSIS — T46.6X5A STATIN MYOPATHY: ICD-10-CM

## 2024-02-05 DIAGNOSIS — E78.5 HYPERLIPIDEMIA ASSOCIATED WITH TYPE 2 DIABETES MELLITUS: Primary | ICD-10-CM

## 2024-02-05 DIAGNOSIS — E11.69 HYPERLIPIDEMIA ASSOCIATED WITH TYPE 2 DIABETES MELLITUS: Primary | ICD-10-CM

## 2024-02-05 DIAGNOSIS — G72.0 STATIN MYOPATHY: ICD-10-CM

## 2024-02-05 DIAGNOSIS — E11.65 TYPE 2 DIABETES MELLITUS WITH HYPERGLYCEMIA, WITHOUT LONG-TERM CURRENT USE OF INSULIN: Chronic | ICD-10-CM

## 2024-02-05 RX ORDER — BLOOD-GLUCOSE SENSOR
EACH MISCELLANEOUS
Qty: 6 EACH | Refills: 3 | Status: SHIPPED | OUTPATIENT
Start: 2024-02-05 | End: 2024-04-24

## 2024-02-05 NOTE — TELEPHONE ENCOUNTER
----- Message from Prem Simons, PharmCAITLYN sent at 2/5/2024 11:57 AM CST -----  Regarding: PCSK9 switch  Good morning,    We has message office on 1/29 in regards to Pt insurance plan switch to Repatha. We have not received any orders yet. Please advise.     Thank you.  Prem Simons  Clinical Pharmacist, PharmD    [facility]  [address] [place], [place] [address] [address]  Phone: [phone]  Fax: [phone]

## 2024-03-06 ENCOUNTER — TELEPHONE (OUTPATIENT)
Dept: CARDIOLOGY | Facility: CLINIC | Age: 64
End: 2024-03-06
Payer: MEDICARE

## 2024-03-06 NOTE — TELEPHONE ENCOUNTER
----- Message from Vanessa Oropeza MD sent at 3/6/2024 12:51 PM CST -----  Please inform the patient that his diabetes is not controlled and he should speak to Dr. Pena about it. Lipids are fine. We may make some changes in his regimen during his visit with me.

## 2024-03-11 ENCOUNTER — OFFICE VISIT (OUTPATIENT)
Dept: CARDIOLOGY | Facility: CLINIC | Age: 64
End: 2024-03-11
Payer: MEDICARE

## 2024-03-11 VITALS
SYSTOLIC BLOOD PRESSURE: 124 MMHG | BODY MASS INDEX: 26.12 KG/M2 | HEIGHT: 69 IN | HEART RATE: 72 BPM | WEIGHT: 176.38 LBS | DIASTOLIC BLOOD PRESSURE: 66 MMHG

## 2024-03-11 DIAGNOSIS — E78.5 HYPERLIPIDEMIA ASSOCIATED WITH TYPE 2 DIABETES MELLITUS: ICD-10-CM

## 2024-03-11 DIAGNOSIS — I20.89 OTHER FORMS OF ANGINA PECTORIS: ICD-10-CM

## 2024-03-11 DIAGNOSIS — T46.6X5A STATIN MYOPATHY: ICD-10-CM

## 2024-03-11 DIAGNOSIS — E11.65 TYPE 2 DIABETES MELLITUS WITH HYPERGLYCEMIA, WITHOUT LONG-TERM CURRENT USE OF INSULIN: Chronic | ICD-10-CM

## 2024-03-11 DIAGNOSIS — G72.0 STATIN MYOPATHY: ICD-10-CM

## 2024-03-11 DIAGNOSIS — I25.10 CORONARY ARTERY DISEASE INVOLVING NATIVE CORONARY ARTERY OF NATIVE HEART WITHOUT ANGINA PECTORIS: Primary | ICD-10-CM

## 2024-03-11 DIAGNOSIS — I10 ESSENTIAL HYPERTENSION: ICD-10-CM

## 2024-03-11 DIAGNOSIS — I70.0 AORTIC ATHEROSCLEROSIS: ICD-10-CM

## 2024-03-11 DIAGNOSIS — E11.69 HYPERLIPIDEMIA ASSOCIATED WITH TYPE 2 DIABETES MELLITUS: ICD-10-CM

## 2024-03-11 PROCEDURE — 99214 OFFICE O/P EST MOD 30 MIN: CPT | Mod: S$PBB,,, | Performed by: INTERNAL MEDICINE

## 2024-03-11 PROCEDURE — 99214 OFFICE O/P EST MOD 30 MIN: CPT | Mod: PBBFAC,PO | Performed by: INTERNAL MEDICINE

## 2024-03-11 PROCEDURE — 99999 PR PBB SHADOW E&M-EST. PATIENT-LVL IV: CPT | Mod: PBBFAC,,, | Performed by: INTERNAL MEDICINE

## 2024-03-11 NOTE — PROGRESS NOTES
Subjective:   Patient ID:  Darrell Hinds Jr. is a 63 y.o. male who presents for follow-up of Coronary Artery Disease and Hyperlipidemia      HPI: in 2021, coronary stenting    Summary       The Mid LAD-1 lesion was 75% stenosed with 0% stenosis post-intervention. The Mid LAD-2 lesion was 90% stenosed with 0% stenosis post-intervention.  A STENT RESOLUTE DOUGLAS [id number] drug eluting stent was successfully placed. Post dilated with 3.5 NC balloon  IVUS was used for LAD sent sizing  The 3rd RPL lesion was 100% stenosed, small caliber with collaterals from LAD  The Dist Cx lesion was 70% stenosed. The 1st Mrg lesion was 60% stenosed.  The filling pressures on the left were normal.  The pre-procedure left ventricular end diastolic pressure was 5.  The estimated blood loss was <50 mL.     The procedure log was documented by Documenter: Tierra Sun RN; Kristofer Mcdaniels and verified by Lawson Faulkner MD.    Since then patient is doing well, but remains sedentary. Therefore, it is hard to assess if he has any anginal symptoms at this time. He lost his PCP and changed insurance to Medicare with [employer] supplement.  Diabetes remains poorly controlled.  Lipids are fine on Repatha and Zetia.      Lab Results   Component Value Date     03/05/2024    K 5.2 (H) 03/05/2024     03/05/2024    CO2 24 03/05/2024    BUN 19 03/05/2024    CREATININE 0.90 03/05/2024     (H) 03/05/2024    HGBA1C 8.1 (H) 03/05/2024    AST 24 03/05/2024    ALT 19 03/05/2024    ALBUMIN 4.4 03/05/2024    PROT 7.0 03/05/2024    BILITOT 0.7 03/05/2024    WBC 5.05 07/17/2023    HGB 15.1 07/17/2023    HCT 45.7 07/17/2023    MCV 93 07/17/2023     07/17/2023    INR 1.0 01/08/2022    PSA 0.48 07/17/2023    TSH 1.280 03/05/2024    CHOL 81 (L) 03/05/2024    HDL 38 (L) 03/05/2024    LDLCALC 25.0 (L) 03/05/2024    TRIG 90 03/05/2024       No results found in the last 24 hours.     Review of Systems   Constitutional: Negative.   HENT: Negative.    "  Eyes: Negative.    Cardiovascular: Negative.  Negative for chest pain, claudication, dyspnea on exertion, irregular heartbeat, leg swelling, near-syncope, palpitations and syncope.   Respiratory: Negative.  Negative for cough, shortness of breath, snoring and wheezing.    Endocrine: Negative.  Negative for cold intolerance, heat intolerance, polydipsia, polyphagia and polyuria.   Hematologic/Lymphatic: Bruises/bleeds easily.   Skin: Negative.    Musculoskeletal:  Positive for muscle cramps.   Gastrointestinal: Negative.    Genitourinary: Negative.    Neurological:  Positive for numbness.   Psychiatric/Behavioral: Negative.         Objective:   Physical Exam  Vitals and nursing note reviewed.   Constitutional:       Appearance: He is well-developed.      Comments: /66   Pulse 72   Ht 5' 9" (1.753 m)   Wt 80 kg (176 lb 5.9 oz)   BMI 26.05 kg/m²      HENT:      Head: Normocephalic.   Eyes:      Pupils: Pupils are equal, round, and reactive to light.   Neck:      Thyroid: No thyromegaly.      Vascular: No carotid bruit.   Cardiovascular:      Rate and Rhythm: Normal rate and regular rhythm.      Pulses: Intact distal pulses.           Carotid pulses are 2+ on the right side and 2+ on the left side.       Radial pulses are 2+ on the right side and 2+ on the left side.        Femoral pulses are 2+ on the right side and 2+ on the left side.       Popliteal pulses are 2+ on the right side and 2+ on the left side.        Dorsalis pedis pulses are 2+ on the right side and 2+ on the left side.        Posterior tibial pulses are 2+ on the right side and 2+ on the left side.      Heart sounds: Normal heart sounds. No murmur heard.     No friction rub. No gallop.   Pulmonary:      Effort: Pulmonary effort is normal. No respiratory distress.      Breath sounds: Normal breath sounds. No wheezing or rales.   Chest:      Chest wall: No tenderness.   Abdominal:      General: Bowel sounds are normal.      Palpations: " "Abdomen is soft.   Musculoskeletal:         General: Normal range of motion.      Cervical back: Normal range of motion and neck supple.   Skin:     General: Skin is warm and dry.   Neurological:      Mental Status: He is alert and oriented to person, place, and time.           Assessment and Plan:     Problem List Items Addressed This Visit          Cardiology Problems    Essential hypertension    Hyperlipidemia associated with type 2 diabetes mellitus    Coronary artery disease involving native coronary artery of native heart without angina pectoris - Primary    Relevant Orders    Cardiac PET Scan Stress    Echo    Aortic atherosclerosis    Relevant Orders    Cardiac PET Scan Stress    Echo    Other forms of angina pectoris       Other    Type 2 diabetes mellitus with hyperglycemia (Chronic)    Statin myopathy       Patient's Medications   New Prescriptions    No medications on file   Previous Medications    ASPIRIN (ECOTRIN) 81 MG EC TABLET    Take 81 mg by mouth once daily.    AVANAFIL (STENDRA) 200 MG TAB    Take 200 mg by mouth as needed (prn ED).    BD ULTRA-FINE NAT PEN NEEDLE 32 GAUGE X 5/32" NDLE    ONCE A DAY    BLOOD SUGAR DIAGNOSTIC STRP    To check BG 2 times daily, to use with insurance preferred meter    BLOOD-GLUCOSE METER KIT    To check BG 2 times daily, to use with insurance preferred meter    BLOOD-GLUCOSE SENSOR (FREESTYLE MICHELLE 3 SENSOR) GIOVANNA    Change every 14 days.    EMPAGLIFLOZIN-METFORMIN (SYNJARDY) 12.5-1,000 MG TAB    Take 1 tablet by mouth 2 (two) times daily.    EVOLOCUMAB (REPATHA SURECLICK) 140 MG/ML PNIJ    Inject 1 mL (140 mg total) into the skin every 14 (fourteen) days.    EZETIMIBE (ZETIA) 10 MG TABLET    TAKE ONE TABLET BY MOUTH EVERY DAY    FLASH GLUCOSE SENSOR (FREESTYLE MICHELLE 14 DAY SENSOR) KIT    APPLY TO SKIN EVERY 14 DAYS AS DIRECTED    INSULIN DETEMIR U-100, LEVEMIR, (LEVEMIR FLEXTOUCH U100 INSULIN) 100 UNIT/ML (3 ML) INPN PEN    Inject 20 Units into the skin once " daily.    LANCETS MISC    To check BG 2 times daily, to use with insurance preferred meter    LOSARTAN (COZAAR) 50 MG TABLET    Take 1 tablet (50 mg total) by mouth once daily.    METOPROLOL SUCCINATE (TOPROL-XL) 25 MG 24 HR TABLET    Take 1 tablet (25 mg total) by mouth once daily.    PULSE OXIMETER (PULSE OXIMETER) DEVICE    by Apply Externally route 2 (two) times a day. Use twice daily at 8 AM and 3 PM and record the value in [facility] as directed.    REPAGLINIDE (PRANDIN) 1 MG TABLET    Take 1 tablet (1 mg total) by mouth 3 (three) times daily before meals.   Modified Medications    No medications on file   Discontinued Medications    No medications on file     Advised to establish with a new PCP. List of PCP's at [place] location  provided.  Review cardiac PET stress and echo results and advise.  Follow up in about 6 months (around 9/11/2024).

## 2024-04-17 NOTE — PROGRESS NOTES
Subjective:      Patient ID: Darrell Hinds Jr. is a 63 y.o. male.    Chief Complaint:  No chief complaint on file.    History of Present Illness  Darrell Hinds Jr. is here for follow up of DM.  Previously seen by me 12/2023.  This is a MyChart video visit.    The patient location is: [place]  The chief complaint leading to consultation is: DM  Visit type: Virtual visit with synchronous audio and video  Total time spent with patient: see below  Each patient to whom he or she provides medical services by telemedicine is:  (1) informed of the relationship between the physician and patient and the respective role of any other health care provider with respect to management of the patient; and (2) notified that he or she may decline to receive medical services by telemedicine and may withdraw from such care at any time.    Recent health screening:  CHOL 106  HDL 51  TRIG 126  LDL 30  Non HDL 55      With regards to Diabetes:    Diagnosed: ~2020  DE: 2/2022  Known complications:  DKA Denies  RN Denies  Eye Exam: 8/2022  PN Denies  Podiatry: 5/2022  Nephropathy Denies  CAD +  Denies history of pancreatitis & personal/family history of medullary thyroid cancer.     Diet/Exercise:  Eats 2-3 meals a day.   Snacks : Denies   Drinks : coffee, water, unsweet tea, Powerade.  Exercise - tries to stay active.   Recent illness, injury, steroids: Denies    Current regimen:  Synjardy 12.5-1000 -- 1 tablet twice daily   Levemir 20 units daily   Prandin 1 mg three times daily before meals     Occasionally missing Prandin with lunch and taking after dinner.     Other medications tried:  Januvia - weight loss ~20lbs    Glucose Monitor: Patricia  No backup glucometer  6 times a day testing  Log reviewed: out of sensors since December     Hypoglycemia:  Denies  Knows how to correct with 15 grams of carbs- juice, coke, or a peppermint.     Diabetes Management Status    Hemoglobin A1C   Date Value Ref Range Status   04/05/2024 8.0 (H) 4.0 - 5.6 %  Final     Comment:     ADA Screening Guidelines:  5.7-6.4%  Consistent with prediabetes  >or=6.5%  Consistent with diabetes    High levels of fetal hemoglobin interfere with the HbA1C  assay. Heterozygous hemoglobin variants (HbS, HgC, etc)do  not significantly interfere with this assay.   However, presence of multiple variants may affect accuracy.     03/05/2024 8.1 (H) 4.0 - 5.6 % Final     Comment:     ADA Screening Guidelines:  5.7-6.4%  Consistent with prediabetes  >or=6.5%  Consistent with diabetes    High levels of fetal hemoglobin interfere with the HbA1C  assay. Heterozygous hemoglobin variants (HbS, HgC, etc)do  not significantly interfere with this assay.   However, presence of multiple variants may affect accuracy.     12/04/2023 7.4 (H) 4.0 - 5.6 % Final     Comment:     ADA Screening Guidelines:  5.7-6.4%  Consistent with prediabetes  >or=6.5%  Consistent with diabetes    High levels of fetal hemoglobin interfere with the HbA1C  assay. Heterozygous hemoglobin variants (HbS, HgC, etc)do  not significantly interfere with this assay.   However, presence of multiple variants may affect accuracy.         Statin: Not taking  ACE/ARB: Taking  Screening or Prevention Patient's value Goal Complete/Controlled?   HgA1C Testing and Control   Lab Results   Component Value Date    HGBA1C 8.0 (H) 04/05/2024      Annually/Less than 8% No   Lipid profile : 03/05/2024 Annually Yes   LDL control Lab Results   Component Value Date    LDLCALC 25.0 (L) 03/05/2024    Annually/Less than 100 mg/dl  No   Nephropathy screening Lab Results   Component Value Date    LABMICR 10.0 12/04/2023     Lab Results   Component Value Date    PROTEINUA Negative 07/22/2021    Annually Yes   Blood pressure BP Readings from Last 1 Encounters:   03/11/24 124/66    Less than 140/90 Yes   Dilated retinal exam : 08/17/2022 Annually Yes   Foot exam   : 07/17/2023 Annually Yes     Review of Systems  As above    There were no vitals taken for this  visit.    There is no height or weight on file to calculate BMI.    Lab Review:   Lab Results   Component Value Date    HGBA1C 8.0 (H) 04/05/2024    HGBA1C 8.1 (H) 03/05/2024    HGBA1C 7.4 (H) 12/04/2023       Lab Results   Component Value Date    CHOL 81 (L) 03/05/2024    HDL 38 (L) 03/05/2024    LDLCALC 25.0 (L) 03/05/2024    TRIG 90 03/05/2024    CHOLHDL 46.9 03/05/2024     Lab Results   Component Value Date     04/05/2024    K 4.9 04/05/2024     04/05/2024    CO2 22 (L) 04/05/2024     (H) 04/05/2024    BUN 20 04/05/2024    CREATININE 0.9 04/05/2024    CALCIUM 9.4 04/05/2024    PROT 6.6 04/05/2024    ALBUMIN 4.3 04/05/2024    BILITOT 0.6 04/05/2024    ALKPHOS 64 04/05/2024    AST 16 04/05/2024    ALT 14 04/05/2024    ANIONGAP 8 04/05/2024    ESTGFRAFRICA >60.0 04/25/2022    EGFRNONAA >60.0 04/25/2022    TSH 1.280 03/05/2024     Vit D, 25-Hydroxy   Date Value Ref Range Status   07/22/2021 23 (L) 30 - 96 ng/mL Final     Comment:     Vitamin D deficiency.........<10 ng/mL                              Vitamin D insufficiency......10-29 ng/mL       Vitamin D sufficiency........> or equal to 30 ng/mL  Vitamin D toxicity............>100 ng/mL       Assessment and Plan     1. Type 2 diabetes mellitus with hyperglycemia, without long-term current use of insulin  flash glucose sensor (FREESTYLE MICHELLE 2 SENSOR) Kit    insulin (BASAGLAR KWIKPEN U-100 INSULIN) glargine 100 units/mL SubQ pen    blood-glucose meter kit    lancets Misc    blood sugar diagnostic Strp    losartan (COZAAR) 50 MG tablet    metoprolol succinate (TOPROL-XL) 25 MG 24 hr tablet    Ambulatory referral/consult to Internal Medicine    Hemoglobin A1C    Comprehensive Metabolic Panel      2. Essential hypertension  losartan (COZAAR) 50 MG tablet    metoprolol succinate (TOPROL-XL) 25 MG 24 hr tablet          Type 2 diabetes mellitus with hyperglycemia  -- Labs prior to follow up.  -- A1c goal <7%.  -- Medications discussed:  OFELIA    GLP1-DPP4 - does not want to retry due to weight loss  OWENS   SGLT2   Insulin   -- Reviewed logs/CGM:  No data to review - issues with getting Patricia sensors - reordered.  Glucometer ordered and encouraged to resume SMBG checks if unable to get Patricia.   Instructed to send glucose logs in 7 days.  Reach out to me sooner for any glucose <70 or consistently >180.  -- Medication Changes:   Synjardy 12.5-1000 -- 1 tablet twice daily   Levemir 20 units daily   Prandin 1 mg three times daily before meals   Encouraged compliance.   -- Reviewed goals of therapy are to get the best control we can without hypoglycemia.  -- Reviewed patient's current insulin regimen. Clarified proper insulin dose and timing in relation to meals, etc. Insulin injection sites and proper rotation instructed.    -- Advised frequent self blood glucose monitoring.  Patient encouraged to document glucose results and bring them to every clinic visit.  -- Hypoglycemia precautions discussed. Instructed on precautions before driving.    -- Call for Bg repeatedly < 90 or > 180.   -- Close adherence to lifestyle changes recommended.   -- Periodic follow ups for eye evaluations, foot care and dental care suggested.          Follow up in about 4 months (around 8/24/2024).    I spent 30 minutes face-to-face with the patient, over half of the visit was spent on counseling and/or coordinating the care of the patient.    Counseling includes:  Diagnostic results, impressions, recommendations   Prognosis   Risk and benefits of management/treatment options   Instructions for management treatment and or follow-up   Importance of compliance with management   Risk factor reduction   Patient education    Visit today included increased complexity associated with the care of the problems addressed and managing the longitudinal care of the patient due to the serious and/or complex managed problems.

## 2024-04-19 ENCOUNTER — PATIENT MESSAGE (OUTPATIENT)
Dept: ADMINISTRATIVE | Facility: HOSPITAL | Age: 64
End: 2024-04-19
Payer: MEDICARE

## 2024-04-22 ENCOUNTER — TELEPHONE (OUTPATIENT)
Dept: CARDIOLOGY | Facility: HOSPITAL | Age: 64
End: 2024-04-22
Payer: MEDICARE

## 2024-04-24 ENCOUNTER — OFFICE VISIT (OUTPATIENT)
Dept: ENDOCRINOLOGY | Facility: CLINIC | Age: 64
End: 2024-04-24
Payer: MEDICARE

## 2024-04-24 ENCOUNTER — HOSPITAL ENCOUNTER (OUTPATIENT)
Dept: CARDIOLOGY | Facility: HOSPITAL | Age: 64
Discharge: HOME | End: 2024-04-24
Attending: INTERNAL MEDICINE
Payer: MEDICARE

## 2024-04-24 VITALS
HEIGHT: 69 IN | HEART RATE: 68 BPM | DIASTOLIC BLOOD PRESSURE: 63 MMHG | SYSTOLIC BLOOD PRESSURE: 149 MMHG | WEIGHT: 176 LBS | BODY MASS INDEX: 26.07 KG/M2

## 2024-04-24 VITALS
BODY MASS INDEX: 26.07 KG/M2 | WEIGHT: 176 LBS | SYSTOLIC BLOOD PRESSURE: 149 MMHG | HEIGHT: 69 IN | DIASTOLIC BLOOD PRESSURE: 63 MMHG | HEART RATE: 70 BPM

## 2024-04-24 DIAGNOSIS — E11.65 TYPE 2 DIABETES MELLITUS WITH HYPERGLYCEMIA, WITHOUT LONG-TERM CURRENT USE OF INSULIN: Primary | Chronic | ICD-10-CM

## 2024-04-24 DIAGNOSIS — I70.0 AORTIC ATHEROSCLEROSIS: ICD-10-CM

## 2024-04-24 DIAGNOSIS — I25.10 CORONARY ARTERY DISEASE INVOLVING NATIVE CORONARY ARTERY OF NATIVE HEART WITHOUT ANGINA PECTORIS: ICD-10-CM

## 2024-04-24 DIAGNOSIS — I10 ESSENTIAL HYPERTENSION: ICD-10-CM

## 2024-04-24 LAB
ASCENDING AORTA: 3.65 CM
AV INDEX (PROSTH): 0.87
AV MEAN GRADIENT: 4 MMHG
AV PEAK GRADIENT: 6 MMHG
AV VALVE AREA BY VELOCITY RATIO: 3.65 CM²
AV VALVE AREA: 4.2 CM²
AV VELOCITY RATIO: 0.76
BSA FOR ECHO PROCEDURE: 1.97 M2
CFR FLOW - ANTERIOR: 2.28
CFR FLOW - INFERIOR: 2.54
CFR FLOW - LATERAL: 2.37
CFR FLOW - MAX: 3.24
CFR FLOW - MIN: 1.38
CFR FLOW - SEPTAL: 2.81
CFR FLOW - WHOLE HEART: 2.5
CV ECHO LV RWT: 0.39 CM
CV PHARM DOSE: 0.4 MG
CV STRESS BASE HR: 70 BPM
DIASTOLIC BLOOD PRESSURE: 63 MMHG
DOP CALC AO PEAK VEL: 1.27 M/S
DOP CALC AO VTI: 27.79 CM
DOP CALC LVOT AREA: 4.8 CM2
DOP CALC LVOT DIAMETER: 2.48 CM
DOP CALC LVOT PEAK VEL: 0.96 M/S
DOP CALC LVOT STROKE VOLUME: 116.69 CM3
DOP CALCLVOT PEAK VEL VTI: 24.17 CM
E WAVE DECELERATION TIME: 274.43 MSEC
E/A RATIO: 0.89
E/E' RATIO: 6.25 M/S
ECHO LV POSTERIOR WALL: 0.92 CM (ref 0.6–1.1)
EJECTION FRACTION- HIGH: 65 %
EJECTION FRACTION: 58 %
END DIASTOLIC INDEX-HIGH: 153 ML/M2
END DIASTOLIC INDEX-LOW: 93 ML/M2
END SYSTOLIC INDEX-HIGH: 71 ML/M2
END SYSTOLIC INDEX-LOW: 31 ML/M2
FRACTIONAL SHORTENING: 30 % (ref 28–44)
INTERVENTRICULAR SEPTUM: 1.01 CM (ref 0.6–1.1)
LA MAJOR: 4.71 CM
LA MINOR: 4.84 CM
LA WIDTH: 3.42 CM
LEFT ATRIUM SIZE: 3.93 CM
LEFT ATRIUM VOLUME INDEX MOD: 21 ML/M2
LEFT ATRIUM VOLUME INDEX: 27.8 ML/M2
LEFT ATRIUM VOLUME MOD: 41.15 CM3
LEFT ATRIUM VOLUME: 54.54 CM3
LEFT INTERNAL DIMENSION IN SYSTOLE: 3.32 CM (ref 2.1–4)
LEFT VENTRICLE DIASTOLIC VOLUME INDEX: 53.89 ML/M2
LEFT VENTRICLE DIASTOLIC VOLUME: 105.62 ML
LEFT VENTRICLE MASS INDEX: 82 G/M2
LEFT VENTRICLE SYSTOLIC VOLUME INDEX: 22.8 ML/M2
LEFT VENTRICLE SYSTOLIC VOLUME: 44.66 ML
LEFT VENTRICULAR INTERNAL DIMENSION IN DIASTOLE: 4.76 CM (ref 3.5–6)
LEFT VENTRICULAR MASS: 159.98 G
LV LATERAL E/E' RATIO: 5 M/S
LV SEPTAL E/E' RATIO: 8.33 M/S
MV A" WAVE DURATION": 9.99 MSEC
MV PEAK A VEL: 0.56 M/S
MV PEAK E VEL: 0.5 M/S
MV STENOSIS PRESSURE HALF TIME: 79.59 MS
MV VALVE AREA P 1/2 METHOD: 2.76 CM2
NUC REST DIASTOLIC VOLUME INDEX: 113
NUC REST EJECTION FRACTION: 53
NUC REST SYSTOLIC VOLUME INDEX: 53
NUC STRESS DIASTOLIC VOLUME INDEX: 130
NUC STRESS EJECTION FRACTION: 57 %
NUC STRESS SYSTOLIC VOLUME INDEX: 56
OHS CV CPX 85 PERCENT MAX PREDICTED HEART RATE MALE: 132
OHS CV CPX MAX PREDICTED HEART RATE: 155
OHS CV CPX PATIENT IS FEMALE: 0
OHS CV CPX PATIENT IS MALE: 1
OHS CV CPX PEAK DIASTOLIC BLOOD PRESSURE: 55 MMHG
OHS CV CPX PEAK HEAR RATE: 75 BPM
OHS CV CPX PEAK RATE PRESSURE PRODUCT: 9375
OHS CV CPX PEAK SYSTOLIC BLOOD PRESSURE: 125 MMHG
OHS CV CPX PERCENT MAX PREDICTED HEART RATE ACHIEVED: 48
OHS CV CPX RATE PRESSURE PRODUCT PRESENTING: NORMAL
OHS CV RV/LV RATIO: 0.75 CM
PERFUSION DEFECT 1 SIZE IN %: 10 %
PERFUSION DEFECT 2 SIZE IN %: 5 %
PISA TR MAX VEL: 2.38 M/S
PULM VEIN S/D RATIO: 2.5
PV PEAK D VEL: 0.34 M/S
PV PEAK S VEL: 0.85 M/S
RA MAJOR: 5.18 CM
RA PRESSURE ESTIMATED: 3 MMHG
RA WIDTH: 3.5 CM
REST FLOW - ANTERIOR: 0.63 CC/MIN/G
REST FLOW - INFERIOR: 0.64 CC/MIN/G
REST FLOW - LATERAL: 0.67 CC/MIN/G
REST FLOW - MAX: 0.88 CC/MIN/G
REST FLOW - MIN: 0.21 CC/MIN/G
REST FLOW - SEPTAL: 0.48 CC/MIN/G
REST FLOW - WHOLE HEART: 0.61 CC/MIN/G
RETIRED EF AND QEF - SEE NOTES: 53 %
RIGHT VENTRICULAR END-DIASTOLIC DIMENSION: 3.58 CM
RV TB RVSP: 5 MMHG
SINUS: 3.69 CM
STJ: 2.76 CM
STRESS FLOW - ANTERIOR: 1.45 CC/MIN/G
STRESS FLOW - INFERIOR: 1.64 CC/MIN/G
STRESS FLOW - LATERAL: 1.59 CC/MIN/G
STRESS FLOW - MAX: 2.32 CC/MIN/G
STRESS FLOW - MIN: 0.41 CC/MIN/G
STRESS FLOW - SEPTAL: 1.37 CC/MIN/G
STRESS FLOW - WHOLE HEART: 1.51 CC/MIN/G
SYSTOLIC BLOOD PRESSURE: 149 MMHG
TDI LATERAL: 0.1 M/S
TDI SEPTAL: 0.06 M/S
TDI: 0.08 M/S
TR MAX PG: 23 MMHG
TRICUSPID ANNULAR PLANE SYSTOLIC EXCURSION: 1.65 CM
TV REST PULMONARY ARTERY PRESSURE: 26 MMHG
Z-SCORE OF LEFT VENTRICULAR DIMENSION IN END DIASTOLE: -1.64
Z-SCORE OF LEFT VENTRICULAR DIMENSION IN END SYSTOLE: -0.3

## 2024-04-24 PROCEDURE — 93306 TTE W/DOPPLER COMPLETE: CPT | Mod: 26,,, | Performed by: INTERNAL MEDICINE

## 2024-04-24 PROCEDURE — 99214 OFFICE O/P EST MOD 30 MIN: CPT | Mod: 95,,, | Performed by: NURSE PRACTITIONER

## 2024-04-24 PROCEDURE — 93306 TTE W/DOPPLER COMPLETE: CPT

## 2024-04-24 PROCEDURE — 78431 MYOCRD IMG PET RST&STRS CT: CPT | Mod: 26,,, | Performed by: INTERNAL MEDICINE

## 2024-04-24 PROCEDURE — 78434 AQMBF PET REST & RX STRESS: CPT | Mod: 26,,, | Performed by: INTERNAL MEDICINE

## 2024-04-24 PROCEDURE — 78434 AQMBF PET REST & RX STRESS: CPT

## 2024-04-24 PROCEDURE — 93016 CV STRESS TEST SUPVJ ONLY: CPT | Mod: ,,, | Performed by: INTERNAL MEDICINE

## 2024-04-24 PROCEDURE — A9555 RB82 RUBIDIUM: HCPCS | Performed by: INTERNAL MEDICINE

## 2024-04-24 PROCEDURE — 63600175 PHARM REV CODE 636 W HCPCS: Performed by: INTERNAL MEDICINE

## 2024-04-24 PROCEDURE — G2211 COMPLEX E/M VISIT ADD ON: HCPCS | Mod: 95,,, | Performed by: NURSE PRACTITIONER

## 2024-04-24 PROCEDURE — 93018 CV STRESS TEST I&R ONLY: CPT | Mod: ,,, | Performed by: INTERNAL MEDICINE

## 2024-04-24 RX ORDER — INSULIN PUMP SYRINGE, 3 ML
EACH MISCELLANEOUS
Qty: 1 EACH | Refills: 0 | Status: SHIPPED | OUTPATIENT
Start: 2024-04-24

## 2024-04-24 RX ORDER — INSULIN GLARGINE 100 [IU]/ML
20 INJECTION, SOLUTION SUBCUTANEOUS DAILY
Qty: 18 ML | Refills: 3 | Status: SHIPPED | OUTPATIENT
Start: 2024-04-24 | End: 2025-04-24

## 2024-04-24 RX ORDER — FLASH GLUCOSE SENSOR
KIT MISCELLANEOUS
Qty: 6 KIT | Refills: 3 | Status: SHIPPED | OUTPATIENT
Start: 2024-04-24

## 2024-04-24 RX ORDER — REGADENOSON 0.08 MG/ML
0.4 INJECTION, SOLUTION INTRAVENOUS
Status: COMPLETED | OUTPATIENT
Start: 2024-04-24 | End: 2024-04-24

## 2024-04-24 RX ORDER — LANCETS
EACH MISCELLANEOUS
Qty: 200 EACH | Refills: 11 | Status: SHIPPED | OUTPATIENT
Start: 2024-04-24

## 2024-04-24 RX ORDER — METOPROLOL SUCCINATE 25 MG/1
25 TABLET, EXTENDED RELEASE ORAL DAILY
Qty: 90 TABLET | Refills: 3 | Status: SHIPPED | OUTPATIENT
Start: 2024-04-24

## 2024-04-24 RX ORDER — LOSARTAN POTASSIUM 50 MG/1
50 TABLET ORAL DAILY
Qty: 90 TABLET | Refills: 3 | Status: SHIPPED | OUTPATIENT
Start: 2024-04-24

## 2024-04-24 RX ADMIN — RUBIDIUM CHLORIDE RB-82 24.8 MILLICURIE: 150 INJECTION, SOLUTION INTRAVENOUS at 01:04

## 2024-04-24 RX ADMIN — RUBIDIUM CHLORIDE RB-82 25 MILLICURIE: 150 INJECTION, SOLUTION INTRAVENOUS at 01:04

## 2024-04-24 RX ADMIN — REGADENOSON 0.4 MG: 0.08 INJECTION, SOLUTION INTRAVENOUS at 01:04

## 2024-04-24 NOTE — Clinical Note
VV in 4 months with labs prior Orders Placed This Encounter     Hemoglobin A1C     Comprehensive Metabolic Panel     Ambulatory referral/consult to Internal Medicine

## 2024-04-24 NOTE — ASSESSMENT & PLAN NOTE
-- Labs prior to follow up.  -- A1c goal <7%.  -- Medications discussed:  MFM   GLP1-DPP4 - does not want to retry due to weight loss  OWENS   SGLT2   Insulin   -- Reviewed logs/CGM:  No data to review - issues with getting Patricia sensors - reordered.  Glucometer ordered and encouraged to resume SMBG checks if unable to get Patricia.   Instructed to send glucose logs in 7 days.  Reach out to me sooner for any glucose <70 or consistently >180.  -- Medication Changes:   Synjardy 12.5-1000 -- 1 tablet twice daily   Levemir 20 units daily   Prandin 1 mg three times daily before meals   Encouraged compliance.   -- Reviewed goals of therapy are to get the best control we can without hypoglycemia.  -- Reviewed patient's current insulin regimen. Clarified proper insulin dose and timing in relation to meals, etc. Insulin injection sites and proper rotation instructed.    -- Advised frequent self blood glucose monitoring.  Patient encouraged to document glucose results and bring them to every clinic visit.  -- Hypoglycemia precautions discussed. Instructed on precautions before driving.    -- Call for Bg repeatedly < 90 or > 180.   -- Close adherence to lifestyle changes recommended.   -- Periodic follow ups for eye evaluations, foot care and dental care suggested.

## 2024-04-25 DIAGNOSIS — E11.69 HYPERLIPIDEMIA ASSOCIATED WITH TYPE 2 DIABETES MELLITUS: ICD-10-CM

## 2024-04-25 DIAGNOSIS — E78.5 HYPERLIPIDEMIA ASSOCIATED WITH TYPE 2 DIABETES MELLITUS: ICD-10-CM

## 2024-04-25 RX ORDER — EZETIMIBE 10 MG/1
TABLET ORAL
Qty: 90 TABLET | Refills: 3 | Status: SHIPPED | OUTPATIENT
Start: 2024-04-25

## 2024-05-06 ENCOUNTER — TELEPHONE (OUTPATIENT)
Dept: FAMILY MEDICINE | Facility: CLINIC | Age: 64
End: 2024-05-06
Payer: MEDICARE

## 2024-05-06 NOTE — TELEPHONE ENCOUNTER
----- Message from Emy Kohler sent at 5/6/2024  4:11 PM CDT -----  Type:  Patient Returning Call    Who Called:pt  Who Left Message for Patient:edna  Does the patient know what this is regarding?:returning call  Would the patient rather a call back or a response via [facility]? call  Best Call Back Number: [phone]  Additional Information:

## 2024-05-08 ENCOUNTER — PATIENT MESSAGE (OUTPATIENT)
Dept: ENDOCRINOLOGY | Facility: CLINIC | Age: 64
End: 2024-05-08
Payer: MEDICARE

## 2024-05-08 DIAGNOSIS — E11.65 TYPE 2 DIABETES MELLITUS WITH HYPERGLYCEMIA, WITHOUT LONG-TERM CURRENT USE OF INSULIN: Primary | ICD-10-CM

## 2024-05-09 ENCOUNTER — TELEPHONE (OUTPATIENT)
Dept: FAMILY MEDICINE | Facility: CLINIC | Age: 64
End: 2024-05-09
Payer: MEDICARE

## 2024-05-09 ENCOUNTER — OFFICE VISIT (OUTPATIENT)
Dept: FAMILY MEDICINE | Facility: CLINIC | Age: 64
End: 2024-05-09
Payer: MEDICARE

## 2024-05-09 VITALS
WEIGHT: 177.56 LBS | HEIGHT: 69 IN | BODY MASS INDEX: 26.3 KG/M2 | SYSTOLIC BLOOD PRESSURE: 126 MMHG | TEMPERATURE: 98 F | HEART RATE: 68 BPM | DIASTOLIC BLOOD PRESSURE: 70 MMHG | OXYGEN SATURATION: 96 %

## 2024-05-09 DIAGNOSIS — Z79.4 TYPE 2 DIABETES MELLITUS WITH HYPERGLYCEMIA, WITH LONG-TERM CURRENT USE OF INSULIN: Chronic | ICD-10-CM

## 2024-05-09 DIAGNOSIS — I10 ESSENTIAL HYPERTENSION: ICD-10-CM

## 2024-05-09 DIAGNOSIS — I25.10 CORONARY ARTERY DISEASE INVOLVING NATIVE CORONARY ARTERY OF NATIVE HEART WITHOUT ANGINA PECTORIS: ICD-10-CM

## 2024-05-09 DIAGNOSIS — Z00.00 ENCOUNTER FOR MEDICAL EXAMINATION TO ESTABLISH CARE: ICD-10-CM

## 2024-05-09 DIAGNOSIS — E11.65 TYPE 2 DIABETES MELLITUS WITH HYPERGLYCEMIA, WITHOUT LONG-TERM CURRENT USE OF INSULIN: Chronic | ICD-10-CM

## 2024-05-09 DIAGNOSIS — Z79.4 TYPE 2 DIABETES MELLITUS WITH BOTH EYES AFFECTED BY MODERATE NONPROLIFERATIVE RETINOPATHY AND MACULAR EDEMA, WITH LONG-TERM CURRENT USE OF INSULIN: ICD-10-CM

## 2024-05-09 DIAGNOSIS — E11.3313 TYPE 2 DIABETES MELLITUS WITH BOTH EYES AFFECTED BY MODERATE NONPROLIFERATIVE RETINOPATHY AND MACULAR EDEMA, WITH LONG-TERM CURRENT USE OF INSULIN: ICD-10-CM

## 2024-05-09 DIAGNOSIS — E78.49 OTHER HYPERLIPIDEMIA: ICD-10-CM

## 2024-05-09 DIAGNOSIS — E11.65 TYPE 2 DIABETES MELLITUS WITH HYPERGLYCEMIA, WITH LONG-TERM CURRENT USE OF INSULIN: Chronic | ICD-10-CM

## 2024-05-09 PROCEDURE — 99215 OFFICE O/P EST HI 40 MIN: CPT | Mod: PBBFAC,PN | Performed by: STUDENT IN AN ORGANIZED HEALTH CARE EDUCATION/TRAINING PROGRAM

## 2024-05-09 PROCEDURE — 99214 OFFICE O/P EST MOD 30 MIN: CPT | Mod: S$PBB,,, | Performed by: STUDENT IN AN ORGANIZED HEALTH CARE EDUCATION/TRAINING PROGRAM

## 2024-05-09 PROCEDURE — 99999 PR PBB SHADOW E&M-EST. PATIENT-LVL V: CPT | Mod: PBBFAC,,, | Performed by: STUDENT IN AN ORGANIZED HEALTH CARE EDUCATION/TRAINING PROGRAM

## 2024-05-09 RX ORDER — BLOOD-GLUCOSE,RECEIVER,CONT
EACH MISCELLANEOUS
Qty: 1 EACH | Refills: 0 | Status: SHIPPED | OUTPATIENT
Start: 2024-05-09

## 2024-05-09 RX ORDER — BLOOD-GLUCOSE SENSOR
EACH MISCELLANEOUS
Qty: 3 EACH | Refills: 11 | Status: SHIPPED | OUTPATIENT
Start: 2024-05-09

## 2024-05-09 NOTE — TELEPHONE ENCOUNTER
----- Message from Ashley Rowe sent at 5/9/2024  7:28 AM CDT -----  Regarding: Appt for today  Contact: [phone]  Type:  Needs Medical Advice    Who Called: Cristy  Would the patient rather a call back or a response via [facility]? Call  Best Call Back Number: [phone]  Additional Information: Patient spoke to office on 5/6 and was told he had an appt today.

## 2024-05-09 NOTE — PROGRESS NOTES
[facility] Note    Chief Complaint      Chief Complaint   Patient presents with    Establish Care     History of Present Illness      HPI    Darrell Hinds Jr. is a 63 y.o. male with sHTN, T2DM, HLD and CAD s/p 3 stents who presents for establishment of care. He endorses no new complaints today, compliant with all medications except repaglinide he forgets to take due to work schedule. He follows with endo team as well. He is active and now working on improving his diet. HE lost his mum yesterday and managing the grief as expected.     Problem List Addressed This Visit:  1. Encounter for medical examination to establish care    2. Type 2 diabetes mellitus with hyperglycemia, with long-term current use of insulin  -     Ambulatory referral/consult to Ophthalmology; Future; Expected date: 05/16/2024    3. Essential hypertension    4. Other hyperlipidemia    5. Type 2 diabetes mellitus with both eyes affected by moderate nonproliferative retinopathy and macular edema, with long-term current use of insulin    6. Coronary artery disease involving native coronary artery of native heart without angina pectoris         Health Maintenance   Topic Date Due    Shingles Vaccine (1 of 2) Never done    Eye Exam  08/17/2023    Hemoglobin A1c  07/05/2024    Foot Exam  07/17/2024    TETANUS VACCINE  02/09/2025    Lipid Panel  03/05/2025    Aspirin/Antiplatelet Therapy  03/11/2025    Colorectal Cancer Screening  02/04/2032    Hepatitis C Screening  Completed    Abdominal Aortic Aneurysm Screening  Completed       Past Medical History:   Diagnosis Date    Cataract     Diabetes mellitus, type 2     Diabetic retinopathy     Encounter for cosmetic surgery     GERD (gastroesophageal reflux disease)     Hyperlipidemia     Hypertension     Hypertrophy of nasal turbinates     Kidney stone 2009    Nasal obstruction     Nasal septal deformity     Pneumonia due to COVID-19 virus 1/8/2022    Unexplained weight loss  9/22/2021    Unspecified disorder of kidney and ureter        Past Surgical History:   Procedure Laterality Date    COLONOSCOPY N/A 2/4/2022    Procedure: COLONOSCOPY Suprep covid test 9/5/21;  Surgeon: Miles Amador MD;  Location: [facility];  Service: Endoscopy;  Laterality: N/A;    COSMETIC SURGERY      Rhinoplasty    ESOPHAGOGASTRODUODENOSCOPY N/A 2/4/2022    Procedure: EGD (ESOPHAGOGASTRODUODENOSCOPY);  Surgeon: Miles Amador MD;  Location: [facility];  Service: Endoscopy;  Laterality: N/A;    EXCISION TURBINATE, SUBMUCOUS      HERNIA REPAIR      Umbilical    LEFT HEART CATHETERIZATION N/A 10/5/2021    Procedure: Left heart cath;  Surgeon: Lawson Faulkner MD;  Location: [facility] CATH LAB;  Service: Cardiology;  Laterality: N/A;    NASAL SEPTUM SURGERY         family history includes Cancer in his daughter; Cataracts in his father and mother; Diabetes in his brother and father; Heart disease in his father, paternal grandfather, and paternal grandmother; Heart failure in his father; Hyperlipidemia in his father; Hypertension in his brother and mother; Kidney disease in his maternal grandmother; No Known Problems in his maternal aunt, maternal grandfather, maternal uncle, paternal aunt, paternal uncle, and sister; Stroke in his father.    Social History     Tobacco Use    Smoking status: Never    Smokeless tobacco: Never   Substance Use Topics    Alcohol use: Not Currently     Alcohol/week: 1.0 standard drink of alcohol     Types: 1 Cans of beer per week     Comment: seldom..maybe once a month    Drug use: No       Review of Systems   Constitutional:  Negative for chills, fatigue and fever.   Respiratory:  Negative for cough and shortness of breath.    Cardiovascular:  Negative for chest pain, palpitations and leg swelling.   Genitourinary:  Negative for dysuria, flank pain and frequency.   Musculoskeletal:  Negative for gait problem and joint swelling.       Outpatient Encounter Medications as of 5/9/2024  "  Medication Sig Dispense Refill    aspirin (ECOTRIN) 81 MG EC tablet Take 81 mg by mouth once daily.      BD ULTRA-FINE NAT PEN NEEDLE 32 gauge x 5/32" Ndle ONCE A  each 3    blood sugar diagnostic Strp To check BG 4 times daily, to use with insurance preferred meter 200 each 11    blood-glucose meter kit To check BG 4 times daily, to use with insurance preferred meter 1 each 0    blood-glucose meter,continuous (DEXCOM G7 ) Misc Use as directed. 1 each 0    blood-glucose sensor (DEXCOM G7 SENSOR) Pattie Change every 10 days. 3 each 11    empagliflozin-metformin (SYNJARDY) 12.5-1,000 mg Tab Take 1 tablet by mouth 2 (two) times daily. 180 tablet 3    evolocumab (REPATHA SURECLICK) 140 mg/mL PnIj Inject 1 mL (140 mg total) into the skin every 14 (fourteen) days. 6 each 3    ezetimibe (ZETIA) 10 mg tablet TAKE ONE TABLET BY MOUTH EVERY DAY 90 tablet 3    flash glucose sensor (FREESTYLE MICHELLE 2 SENSOR) Kit Change every 14 days. 6 kit 3    insulin (BASAGLAR KWIKPEN U-100 INSULIN) glargine 100 units/mL SubQ pen Inject 20 Units into the skin once daily. 18 mL 3    lancets Misc To check BG 4 times daily, to use with insurance preferred meter 200 each 11    losartan (COZAAR) 50 MG tablet Take 1 tablet (50 mg total) by mouth once daily. 90 tablet 3    metoprolol succinate (TOPROL-XL) 25 MG 24 hr tablet Take 1 tablet (25 mg total) by mouth once daily. 90 tablet 3    pulse oximeter (PULSE OXIMETER) device by Apply Externally route 2 (two) times a day. Use twice daily at 8 AM and 3 PM and [facility] as directed. 1 each 0    repaglinide (PRANDIN) 1 MG tablet Take 1 tablet (1 mg total) by mouth 3 (three) times daily before meals. 270 tablet 3    [DISCONTINUED] avanafiL (STENDRA) 200 mg Tab Take 200 mg by mouth as needed (prn ED). 8 tablet 11     No facility-administered encounter medications on file as of 5/9/2024.        Review of patient's allergies indicates:  No Known Allergies    Physical Exam " "     Vital Signs  Temp: 98.1 °F (36.7 °C)  Temp Source: Temporal  Pulse: 68  SpO2: 96 %  BP: 126/70  BP Location: Right arm  Patient Position: Sitting  Pain Score: 0-No pain  Height and Weight  Height: 5' 9" (175.3 cm)  Weight: 80.6 kg (177 lb 9.3 oz)  BSA (Calculated - sq m): 1.98 sq meters  BMI (Calculated): 26.2  Weight in (lb) to have BMI = 25: 168.9]    Physical Exam  Constitutional:       Appearance: He is obese.   HENT:      Head: Normocephalic and atraumatic.      Mouth/Throat:      Mouth: Mucous membranes are moist.      Pharynx: Oropharynx is clear.   Eyes:      Extraocular Movements: Extraocular movements intact.      Pupils: Pupils are equal, round, and reactive to light.   Cardiovascular:      Rate and Rhythm: Normal rate and regular rhythm.      Pulses: Normal pulses.      Heart sounds: Normal heart sounds.   Pulmonary:      Effort: Pulmonary effort is normal.      Breath sounds: Normal breath sounds.   Abdominal:      General: There is no distension.      Palpations: Abdomen is soft. There is no mass.   Musculoskeletal:      Right lower leg: No edema.      Left lower leg: No edema.   Lymphadenopathy:      Cervical: No cervical adenopathy.   Skin:     General: Skin is warm.   Neurological:      General: No focal deficit present.      Mental Status: He is alert.   Psychiatric:         Mood and Affect: Mood normal.          Laboratory:  CBC:  No results for input(s): "WBC", "RBC", "HGB", "HCT", "PLT", "MCV", "MCH", "MCHC" in the last 2160 hours.  CMP:  Recent Labs   Lab Result Units 03/05/24  0842 04/05/24  0826   Glucose mg/dL 164* 151*   Calcium mg/dL 9.1 9.4   Albumin g/dL 4.4 4.3   Total Protein g/dL 7.0 6.6   Sodium mmol/L 144 139   Potassium mmol/L 5.2* 4.9   CO2 mmol/L 24 22*   Chloride mmol/L 110 109   BUN mg/dL 19 20   Alkaline Phosphatase U/L 67 64   ALT U/L 19 14   AST U/L 24 16   Total Bilirubin mg/dL 0.7 0.6     URINALYSIS:  No results for input(s): "COLORU", "CLARITYU", "SPECGRAV", "PHUR", " ""PROTEINUA", "GLUCOSEU", "BILIRUBINCON", "BLOODU", "WBCU", "RBCU", "BACTERIA", "MUCUS", "NITRITE", "LEUKOCYTESUR", "UROBILINOGEN", "HYALINECASTS" in the last 2160 hours.   LIPIDS:  Recent Labs   Lab Result Units 03/05/24  0842   TSH uIU/mL 1.280   HDL mg/dL 38*   Cholesterol mg/dL 81*   Triglycerides mg/dL 90   LDL Cholesterol mg/dL 25.0*   HDL/Cholesterol Ratio % 46.9   Non-HDL Cholesterol mg/dL 43   Total Cholesterol/HDL Ratio  2.1     TSH:  Recent Labs   Lab Result Units 03/05/24  0842   TSH uIU/mL 1.280     A1C:  Recent Labs   Lab Result Units 03/05/24  0842 04/05/24  0826   Hemoglobin A1C % 8.1* 8.0*       Radiology:      Assessment/Plan     Darrell Hinds Jr. is a 63 y.o.male with:    1. Encounter for medical examination to establish care    2. Type 2 diabetes mellitus with hyperglycemia, with long-term current use of insulin  - Ambulatory referral/consult to Ophthalmology; Future    3. Essential hypertension    4. Other hyperlipidemia    5. Type 2 diabetes mellitus with both eyes affected by moderate nonproliferative retinopathy and macular edema, with long-term current use of insulin    6. Coronary artery disease involving native coronary artery of native heart without angina pectoris    Plan :  -all recent labs reviewed and UTD  -due for annual eye exam, referral placed  -pending CGM authorization ( DEXCOM)  -med compliance reinforced   -c/w current regimen  -follows with endo ( Imelda Paz as well)  -c/w life style modifications      -Continue current medications and maintain follow up with specialists.      Patient verbalizes understanding and agrees with current treatment plan.      Kathy Stewart MD  [facility]  "

## 2024-05-09 NOTE — TELEPHONE ENCOUNTER
----- Message from Ashley Rowe sent at 5/9/2024  7:28 AM CDT -----  Regarding: Appt for today  Contact: [phone]  Type:  Needs Medical Advice    Who Called: Cristy  Would the patient rather a call back or a response via [facility]? Call  Best Call Back Number: [phone]  Additional Information: Patient spoke to office on 5/6 and was told he had an appt today.   fair minus

## 2024-05-10 ENCOUNTER — TELEPHONE (OUTPATIENT)
Dept: FAMILY MEDICINE | Facility: CLINIC | Age: 64
End: 2024-05-10
Payer: MEDICARE

## 2024-05-10 NOTE — TELEPHONE ENCOUNTER
----- Message from Ashley Rowe sent at 5/9/2024  7:28 AM CDT -----  Regarding: Appt for today  Contact: [phone]  Type:  Needs Medical Advice    Who Called: Cristy  Would the patient rather a call back or a response via [facility]? Call  Best Call Back Number: [phone]  Additional Information: Patient spoke to office on 5/6 and was told he had an appt today.

## 2024-05-28 ENCOUNTER — TELEPHONE (OUTPATIENT)
Dept: FAMILY MEDICINE | Facility: CLINIC | Age: 64
End: 2024-05-28
Payer: MEDICARE

## 2024-05-28 NOTE — TELEPHONE ENCOUNTER
----- Message from Jose Erazo sent at 5/28/2024 12:13 PM CDT -----  Contact: Pt  .Type:  Needs Medical Advice    Who Called: pt    Pharmacy name and phone #: [id number] [place] - [id number] [facility]   Phone: [phone]  Fax: [phone]  Would the patient rather a call back or a response via [facility]?  Call back  Best Call Back Number: [phone]   Additional Information: Pt. Needs a prior authorization on his blood-glucose sensor (DEXCOM G7 SENSOR) Pattie

## 2024-05-31 DIAGNOSIS — E11.65 TYPE 2 DIABETES MELLITUS WITH HYPERGLYCEMIA, WITHOUT LONG-TERM CURRENT USE OF INSULIN: ICD-10-CM

## 2024-05-31 RX ORDER — EMPAGLIFLOZIN AND METFORMIN HYDROCHLORIDE 12.5; 1 MG/1; MG/1
1 TABLET ORAL 2 TIMES DAILY
Qty: 180 TABLET | Refills: 1 | Status: SHIPPED | OUTPATIENT
Start: 2024-05-31

## 2024-05-31 NOTE — TELEPHONE ENCOUNTER
No care due was identified.  [facility] Embedded Care Due Messages. Reference number: [id number].   5/31/2024 2:12:06 PM CDT

## 2024-05-31 NOTE — TELEPHONE ENCOUNTER
Refill Routing Note   Medication(s) are not appropriate for processing by [facility] for the following reason(s):        Responsible provider unclear    [facility] action(s):  Defer        Medication Therapy Plan: Last ordered: 5/3/23 by Contreras Eden MD. Recent OV but no current order under PCP.      Appointments  past 12m or future 3m with PCP    Date Provider   Last Visit   5/9/2024 Kathy Stewart MD   Next Visit   Visit date not found Kathy Stewart MD   ED visits in past 90 days: 0        Note composed:3:30 PM 05/31/2024

## 2024-06-12 ENCOUNTER — TELEPHONE (OUTPATIENT)
Dept: FAMILY MEDICINE | Facility: CLINIC | Age: 64
End: 2024-06-12
Payer: MEDICARE

## 2024-06-12 NOTE — TELEPHONE ENCOUNTER
----- Message from Yenni Krishnan sent at 6/11/2024  1:38 PM CDT -----  Type:  Pharmacy Calling to Clarify an RX    Name of Caller:pt  [facility]  Prescription Name:Dexcom  What do they need to clarify?:PA  Best Call Back Number:[phone]

## 2024-06-12 NOTE — TELEPHONE ENCOUNTER
----- Message from May Hoyt sent at 6/12/2024  3:00 PM CDT -----  Needs advice from nurse:      Who Called:pt  Regarding:pharmacy is stating that the Dexcom that was called (not sure if sensor or ) needs to be e-scripted not called in and has to have instructions on it  Would the patient rather a call back or VIA [facility]?  Best Call Back number: [phone]    Additional Info:[id number] [place] - [id number] [place] (Ph: [phone])

## 2024-06-12 NOTE — TELEPHONE ENCOUNTER
Call[facility] and [facility] with [facility]; Explained that I was returning a phone call from a provider's office concerning the pt / pa; He states Medicare is need Medical records for pt; He also provided a fax number of [phone] for records to be faxed over to them; Will call the pt to see if its ok to send over medical records to provided fax number concerning DEXCOM

## 2024-06-27 ENCOUNTER — PATIENT MESSAGE (OUTPATIENT)
Dept: OPTOMETRY | Facility: CLINIC | Age: 64
End: 2024-06-27
Payer: MEDICARE

## 2024-06-27 ENCOUNTER — TELEPHONE (OUTPATIENT)
Dept: OPTOMETRY | Facility: CLINIC | Age: 64
End: 2024-06-27
Payer: MEDICARE

## 2024-06-27 NOTE — TELEPHONE ENCOUNTER
----- Message from Vida Spangler sent at 6/27/2024  3:12 PM CDT -----  Contact: [phone]  Type:  Patient Returning Call  Who Left Message for Patient:Tamika   Does the patient know what this is regarding?:Appt  Would the patient rather a call back or a response via [facility]? Call  Best Call Back Number:[phone]  Additional Information:

## 2024-06-27 NOTE — TELEPHONE ENCOUNTER
----- Message from Anna Marie Mcdaniel sent at 6/27/2024  3:06 PM CDT -----  Regarding: Patient Returning Missed Call  Patient is returning missed call . Pts call back- [phone]

## 2024-07-03 ENCOUNTER — PATIENT MESSAGE (OUTPATIENT)
Dept: ENDOCRINOLOGY | Facility: CLINIC | Age: 64
End: 2024-07-03
Payer: MEDICARE

## 2024-07-15 ENCOUNTER — OFFICE VISIT (OUTPATIENT)
Dept: OPTOMETRY | Facility: CLINIC | Age: 64
End: 2024-07-15
Payer: MEDICARE

## 2024-07-15 DIAGNOSIS — H52.7 REFRACTIVE ERROR: ICD-10-CM

## 2024-07-15 DIAGNOSIS — Z79.4 TYPE 2 DIABETES MELLITUS WITH HYPERGLYCEMIA, WITH LONG-TERM CURRENT USE OF INSULIN: Chronic | ICD-10-CM

## 2024-07-15 DIAGNOSIS — H35.033 HYPERTENSIVE RETINOPATHY, BILATERAL: ICD-10-CM

## 2024-07-15 DIAGNOSIS — H25.13 NUCLEAR SCLEROSIS OF BOTH EYES: ICD-10-CM

## 2024-07-15 DIAGNOSIS — I10 ESSENTIAL HYPERTENSION: ICD-10-CM

## 2024-07-15 DIAGNOSIS — E11.3313 TYPE 2 DIABETES MELLITUS WITH BOTH EYES AFFECTED BY MODERATE NONPROLIFERATIVE RETINOPATHY AND MACULAR EDEMA, WITH LONG-TERM CURRENT USE OF INSULIN: Primary | ICD-10-CM

## 2024-07-15 DIAGNOSIS — Z79.4 TYPE 2 DIABETES MELLITUS WITH BOTH EYES AFFECTED BY MODERATE NONPROLIFERATIVE RETINOPATHY AND MACULAR EDEMA, WITH LONG-TERM CURRENT USE OF INSULIN: Primary | ICD-10-CM

## 2024-07-15 DIAGNOSIS — H25.13 NS (NUCLEAR SCLEROSIS), BILATERAL: ICD-10-CM

## 2024-07-15 DIAGNOSIS — E11.65 TYPE 2 DIABETES MELLITUS WITH HYPERGLYCEMIA, WITH LONG-TERM CURRENT USE OF INSULIN: Chronic | ICD-10-CM

## 2024-07-15 PROCEDURE — 99213 OFFICE O/P EST LOW 20 MIN: CPT | Mod: PBBFAC,PN | Performed by: OPTOMETRIST

## 2024-07-15 PROCEDURE — 99214 OFFICE O/P EST MOD 30 MIN: CPT | Mod: S$PBB,,, | Performed by: OPTOMETRIST

## 2024-07-15 PROCEDURE — 99999 PR PBB SHADOW E&M-EST. PATIENT-LVL III: CPT | Mod: PBBFAC,,, | Performed by: OPTOMETRIST

## 2024-07-16 NOTE — PROGRESS NOTES
HPI    CC: Pt presents today for diabetic eye exam. Pt states occasional blurred   vision, mainly when sugar is elevated. Wear OTC readers for near. Pt   denies eye pain or irritation.    NITISH: 8/17/2022, Dr Jo    (+) Changes in vision   (-) Pain  (-) Irritation   (-) Itching   (-) Flashes  (-) Floaters  (+) Glasses wearer, OTC readers for near  (-) CL wearer  (+) Uses eye gtts, AT's gtts prn    Does patient want a refraction today? Yes, if needed    (-) Eye injury  (-) Eye surgery   (-)POHx  (-)FOHx    (+)DM  Hemoglobin A1C       Date                     Value               Ref Range             Status                04/05/2024               8.0 (H)             4.0 - 5.6 %           Final                  03/05/2024               8.1 (H)             4.0 - 5.6 %           Final                  12/04/2023               7.4 (H)             4.0 - 5.6 %           Final                   Last edited by Tamika Bassett MA on 7/15/2024 11:23 AM.            Assessment /Plan     For exam results, see Encounter Report.    Type 2 diabetes mellitus with both eyes affected by moderate nonproliferative retinopathy and macular edema, with long-term current use of insulin    Type 2 diabetes mellitus with hyperglycemia, with long-term current use of insulin  -     Ambulatory referral/consult to Ophthalmology    Refractive error    Nuclear sclerosis of both eyes    NS (nuclear sclerosis), bilateral    Essential hypertension    Hypertensive retinopathy, bilateral      MONITOR. ED PT ON ALL EXAM FINDINGS  OK TO CONTINUE WITH HABITUAL SPECS FOR NEAR VISION  MILD NS OU; PRESURGICAL; MONITOR. UV PROTECTION DISCUSSED   TYPE 2 DM W/ MODERATE NPDR OU AND CME OD>OS; CONTINUE WITH PCP FOR BP AND GLYCEMIC CONTROL; REFER TO RETINA FOR CONSULT   RTC 1 YR//PRN FOR REE/DFE

## 2024-07-30 ENCOUNTER — TELEPHONE (OUTPATIENT)
Dept: CARDIOLOGY | Facility: CLINIC | Age: 64
End: 2024-07-30
Payer: MEDICARE

## 2024-07-30 NOTE — TELEPHONE ENCOUNTER
----- Message from Glenis Cardona sent at 7/30/2024 12:18 PM CDT -----  Regarding: Burst  Patient think he taking to many blood thinner and he starting to get bursted on his body. Please call back @ [phone]. Thank you Glenis

## 2024-07-30 NOTE — TELEPHONE ENCOUNTER
Spoke with patient. Patient reports that he bruises easily. Reviewed medication list. Patient reports taking Aspirin 81 mg. Asked if he was taking any additional blood thinner that might not be on his list. Reports he is unsure and will check with his wife. Recommended he compare his medications to his medication list in [facility]. Patient will do so and call back.

## 2024-08-02 NOTE — TELEPHONE ENCOUNTER
Spoke with patient's wife. Notified of Dr. Oropeza's recommendation. She will check with Mr. Hinds.

## 2024-08-07 DIAGNOSIS — E11.65 INADEQUATELY CONTROLLED DIABETES MELLITUS: ICD-10-CM

## 2024-08-07 RX ORDER — PEN NEEDLE, DIABETIC 31 GX5/16"
NEEDLE, DISPOSABLE MISCELLANEOUS
Qty: 100 EACH | Refills: 3 | Status: SHIPPED | OUTPATIENT
Start: 2024-08-07

## 2024-08-08 NOTE — TELEPHONE ENCOUNTER
Patient reports he in only on aspirin. Complaints of bruising. Please advise.     Unfortunately patient has CAD and  LAD was stented in 2019.  He needs to stay os low dose ASA for life. Alternately we may change it to Plavix, bu I do no think he will bruise much less.  This is the only blood thinner he is on.   130.9

## 2024-10-23 ENCOUNTER — HOSPITAL ENCOUNTER (EMERGENCY)
Facility: HOSPITAL | Age: 64
Discharge: HOME | End: 2024-10-23
Attending: EMERGENCY MEDICINE
Payer: MEDICARE

## 2024-10-23 VITALS
OXYGEN SATURATION: 97 % | WEIGHT: 170 LBS | RESPIRATION RATE: 16 BRPM | HEART RATE: 75 BPM | HEIGHT: 69 IN | BODY MASS INDEX: 25.18 KG/M2 | TEMPERATURE: 98 F | SYSTOLIC BLOOD PRESSURE: 158 MMHG | DIASTOLIC BLOOD PRESSURE: 73 MMHG

## 2024-10-23 DIAGNOSIS — M47.816 OSTEOARTHRITIS OF LUMBAR SPINE, UNSPECIFIED SPINAL OSTEOARTHRITIS COMPLICATION STATUS: ICD-10-CM

## 2024-10-23 DIAGNOSIS — I70.0 AORTIC ATHEROSCLEROSIS: ICD-10-CM

## 2024-10-23 DIAGNOSIS — N40.0 ENLARGED PROSTATE: ICD-10-CM

## 2024-10-23 DIAGNOSIS — M54.50 ACUTE LEFT-SIDED LOW BACK PAIN WITHOUT SCIATICA: Primary | ICD-10-CM

## 2024-10-23 LAB
ALBUMIN SERPL BCP-MCNC: 3.9 G/DL (ref 3.5–5.2)
ALP SERPL-CCNC: 64 U/L (ref 40–150)
ALT SERPL W/O P-5'-P-CCNC: 11 U/L (ref 10–44)
ANION GAP SERPL CALC-SCNC: 9 MMOL/L (ref 8–16)
AST SERPL-CCNC: 15 U/L (ref 10–40)
BACTERIA #/AREA URNS AUTO: NORMAL /HPF
BASOPHILS # BLD AUTO: 0.03 K/UL (ref 0–0.2)
BASOPHILS NFR BLD: 0.4 % (ref 0–1.9)
BILIRUB SERPL-MCNC: 0.4 MG/DL (ref 0.1–1)
BILIRUB UR QL STRIP: NEGATIVE
BUN SERPL-MCNC: 16 MG/DL (ref 8–23)
CALCIUM SERPL-MCNC: 9 MG/DL (ref 8.7–10.5)
CHLORIDE SERPL-SCNC: 106 MMOL/L (ref 95–110)
CLARITY UR REFRACT.AUTO: CLEAR
CO2 SERPL-SCNC: 22 MMOL/L (ref 23–29)
COLOR UR AUTO: YELLOW
CREAT SERPL-MCNC: 1 MG/DL (ref 0.5–1.4)
DIFFERENTIAL METHOD BLD: ABNORMAL
EOSINOPHIL # BLD AUTO: 0.1 K/UL (ref 0–0.5)
EOSINOPHIL NFR BLD: 0.7 % (ref 0–8)
ERYTHROCYTE [DISTWIDTH] IN BLOOD BY AUTOMATED COUNT: 13.1 % (ref 11.5–14.5)
EST. GFR  (NO RACE VARIABLE): >60 ML/MIN/1.73 M^2
GLUCOSE SERPL-MCNC: 153 MG/DL (ref 70–110)
GLUCOSE UR QL STRIP: ABNORMAL
HCT VFR BLD AUTO: 43.6 % (ref 40–54)
HCV AB SERPL QL IA: NORMAL
HGB BLD-MCNC: 14.6 G/DL (ref 14–18)
HGB UR QL STRIP: NEGATIVE
HIV 1+2 AB+HIV1 P24 AG SERPL QL IA: NORMAL
IMM GRANULOCYTES # BLD AUTO: 0.03 K/UL (ref 0–0.04)
IMM GRANULOCYTES NFR BLD AUTO: 0.4 % (ref 0–0.5)
KETONES UR QL STRIP: ABNORMAL
LEUKOCYTE ESTERASE UR QL STRIP: NEGATIVE
LIPASE SERPL-CCNC: 33 U/L (ref 4–60)
LYMPHOCYTES # BLD AUTO: 1.4 K/UL (ref 1–4.8)
LYMPHOCYTES NFR BLD: 18.8 % (ref 18–48)
MCH RBC QN AUTO: 31 PG (ref 27–31)
MCHC RBC AUTO-ENTMCNC: 33.5 G/DL (ref 32–36)
MCV RBC AUTO: 93 FL (ref 82–98)
MICROSCOPIC COMMENT: NORMAL
MONOCYTES # BLD AUTO: 0.5 K/UL (ref 0.3–1)
MONOCYTES NFR BLD: 6.6 % (ref 4–15)
NEUTROPHILS # BLD AUTO: 5.4 K/UL (ref 1.8–7.7)
NEUTROPHILS NFR BLD: 73.1 % (ref 38–73)
NITRITE UR QL STRIP: NEGATIVE
NRBC BLD-RTO: 0 /100 WBC
PH UR STRIP: 6 [PH] (ref 5–8)
PLATELET # BLD AUTO: 195 K/UL (ref 150–450)
PMV BLD AUTO: 9.6 FL (ref 9.2–12.9)
POTASSIUM SERPL-SCNC: 4.6 MMOL/L (ref 3.5–5.1)
PROT SERPL-MCNC: 6.5 G/DL (ref 6–8.4)
PROT UR QL STRIP: NEGATIVE
RBC # BLD AUTO: 4.71 M/UL (ref 4.6–6.2)
RBC #/AREA URNS AUTO: 1 /HPF (ref 0–4)
SODIUM SERPL-SCNC: 137 MMOL/L (ref 136–145)
SP GR UR STRIP: 1.03 (ref 1–1.03)
SQUAMOUS #/AREA URNS AUTO: 1 /HPF
URN SPEC COLLECT METH UR: ABNORMAL
WBC # BLD AUTO: 7.41 K/UL (ref 3.9–12.7)
WBC #/AREA URNS AUTO: 0 /HPF (ref 0–5)
YEAST UR QL AUTO: NORMAL

## 2024-10-23 PROCEDURE — 87389 HIV-1 AG W/HIV-1&-2 AB AG IA: CPT | Performed by: PHYSICIAN ASSISTANT

## 2024-10-23 PROCEDURE — 80053 COMPREHEN METABOLIC PANEL: CPT | Performed by: PHYSICIAN ASSISTANT

## 2024-10-23 PROCEDURE — 99285 EMERGENCY DEPT VISIT HI MDM: CPT | Mod: 25

## 2024-10-23 PROCEDURE — 83690 ASSAY OF LIPASE: CPT | Performed by: PHYSICIAN ASSISTANT

## 2024-10-23 PROCEDURE — 86803 HEPATITIS C AB TEST: CPT | Performed by: PHYSICIAN ASSISTANT

## 2024-10-23 PROCEDURE — 81001 URINALYSIS AUTO W/SCOPE: CPT | Performed by: PHYSICIAN ASSISTANT

## 2024-10-23 PROCEDURE — 85025 COMPLETE CBC W/AUTO DIFF WBC: CPT | Performed by: PHYSICIAN ASSISTANT

## 2024-10-23 PROCEDURE — 25000003 PHARM REV CODE 250: Performed by: PHYSICIAN ASSISTANT

## 2024-10-23 RX ORDER — LIDOCAINE 50 MG/G
1 PATCH TOPICAL
Status: DISCONTINUED | OUTPATIENT
Start: 2024-10-23 | End: 2024-10-23 | Stop reason: HOSPADM

## 2024-10-23 RX ORDER — MELOXICAM 15 MG/1
15 TABLET ORAL DAILY PRN
Qty: 14 TABLET | Refills: 0 | Status: SHIPPED | OUTPATIENT
Start: 2024-10-23 | End: 2024-11-06

## 2024-10-23 RX ORDER — LIDOCAINE 50 MG/G
1 PATCH TOPICAL DAILY PRN
Qty: 10 PATCH | Refills: 0 | Status: SHIPPED | OUTPATIENT
Start: 2024-10-23 | End: 2024-11-02

## 2024-10-23 RX ORDER — METHOCARBAMOL 500 MG/1
500 TABLET, FILM COATED ORAL 2 TIMES DAILY PRN
Qty: 14 TABLET | Refills: 0 | Status: SHIPPED | OUTPATIENT
Start: 2024-10-23 | End: 2024-10-30

## 2024-10-23 RX ORDER — HYDROCODONE BITARTRATE AND ACETAMINOPHEN 5; 325 MG/1; MG/1
1 TABLET ORAL
Status: COMPLETED | OUTPATIENT
Start: 2024-10-23 | End: 2024-10-23

## 2024-10-23 RX ADMIN — LIDOCAINE 1 PATCH: 50 PATCH TOPICAL at 05:10

## 2024-10-23 RX ADMIN — HYDROCODONE BITARTRATE AND ACETAMINOPHEN 1 TABLET: 5; 325 TABLET ORAL at 05:10

## 2024-10-24 ENCOUNTER — TELEPHONE (OUTPATIENT)
Dept: SPINE | Facility: CLINIC | Age: 64
End: 2024-10-24
Payer: MEDICARE

## 2024-10-30 ENCOUNTER — OFFICE VISIT (OUTPATIENT)
Dept: UROLOGY | Facility: CLINIC | Age: 64
End: 2024-10-30
Payer: MEDICARE

## 2024-10-30 VITALS
HEART RATE: 72 BPM | WEIGHT: 175.94 LBS | SYSTOLIC BLOOD PRESSURE: 178 MMHG | BODY MASS INDEX: 26.06 KG/M2 | HEIGHT: 69 IN | DIASTOLIC BLOOD PRESSURE: 73 MMHG

## 2024-10-30 DIAGNOSIS — N40.0 ENLARGED PROSTATE: Primary | ICD-10-CM

## 2024-10-30 DIAGNOSIS — Z09 ENCOUNTER FOR EXAMINATION FOLLOWING TREATMENT AT HOSPITAL: ICD-10-CM

## 2024-10-30 DIAGNOSIS — N20.0 BILATERAL NEPHROLITHIASIS: ICD-10-CM

## 2024-10-30 PROCEDURE — 99215 OFFICE O/P EST HI 40 MIN: CPT | Mod: PBBFAC,PO | Performed by: NURSE PRACTITIONER

## 2024-10-30 PROCEDURE — 99999 PR PBB SHADOW E&M-EST. PATIENT-LVL V: CPT | Mod: PBBFAC,,, | Performed by: NURSE PRACTITIONER

## 2024-10-30 RX ORDER — DUTASTERIDE 0.5 MG/1
0.5 CAPSULE, LIQUID FILLED ORAL DAILY
Qty: 30 CAPSULE | Refills: 11 | Status: SHIPPED | OUTPATIENT
Start: 2024-10-30 | End: 2025-10-30

## 2024-10-31 ENCOUNTER — TELEPHONE (OUTPATIENT)
Dept: UROLOGY | Facility: CLINIC | Age: 64
End: 2024-10-31
Payer: MEDICARE

## 2024-11-06 ENCOUNTER — OFFICE VISIT (OUTPATIENT)
Dept: SPINE | Facility: CLINIC | Age: 64
End: 2024-11-06
Attending: PHYSICAL MEDICINE & REHABILITATION
Payer: MEDICARE

## 2024-11-06 VITALS
SYSTOLIC BLOOD PRESSURE: 156 MMHG | RESPIRATION RATE: 18 BRPM | WEIGHT: 175.94 LBS | DIASTOLIC BLOOD PRESSURE: 70 MMHG | HEIGHT: 69 IN | HEART RATE: 64 BPM | OXYGEN SATURATION: 100 % | BODY MASS INDEX: 26.06 KG/M2

## 2024-11-06 DIAGNOSIS — M47.816 SPONDYLOSIS OF LUMBAR REGION WITHOUT MYELOPATHY OR RADICULOPATHY: ICD-10-CM

## 2024-11-06 DIAGNOSIS — M54.9 DORSALGIA: Primary | ICD-10-CM

## 2024-11-06 DIAGNOSIS — S39.012A STRAIN OF LUMBAR REGION, INITIAL ENCOUNTER: ICD-10-CM

## 2024-11-06 DIAGNOSIS — M54.50 ACUTE LEFT-SIDED LOW BACK PAIN WITHOUT SCIATICA: ICD-10-CM

## 2024-11-06 PROCEDURE — 99215 OFFICE O/P EST HI 40 MIN: CPT | Mod: PBBFAC | Performed by: NURSE PRACTITIONER

## 2024-11-06 PROCEDURE — 99999 PR PBB SHADOW E&M-EST. PATIENT-LVL V: CPT | Mod: PBBFAC,,, | Performed by: NURSE PRACTITIONER
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
## 2024-11-09 DIAGNOSIS — E11.65 TYPE 2 DIABETES MELLITUS WITH HYPERGLYCEMIA, WITHOUT LONG-TERM CURRENT USE OF INSULIN: Chronic | ICD-10-CM

## 2024-11-11 DIAGNOSIS — E11.65 TYPE 2 DIABETES MELLITUS WITH HYPERGLYCEMIA, WITHOUT LONG-TERM CURRENT USE OF INSULIN: Chronic | ICD-10-CM

## 2024-11-11 RX ORDER — REPAGLINIDE 1 MG/1
1 TABLET ORAL
Qty: 270 TABLET | Refills: 3 | OUTPATIENT
Start: 2024-11-11

## 2024-11-11 RX ORDER — REPAGLINIDE 1 MG/1
1 TABLET ORAL
Qty: 270 TABLET | Refills: 0 | Status: SHIPPED | OUTPATIENT
Start: 2024-11-11 | End: 2025-11-11

## 2024-11-19 PROBLEM — R29.898 DECREASED STRENGTH OF TRUNK AND BACK: Status: ACTIVE | Noted: 2024-11-19

## 2024-11-19 PROBLEM — Z74.09 IMPAIRED FUNCTIONAL MOBILITY AND ACTIVITY TOLERANCE: Status: ACTIVE | Noted: 2024-11-19

## 2024-11-20 DIAGNOSIS — E11.9 TYPE 2 DIABETES MELLITUS WITHOUT COMPLICATION: ICD-10-CM

## 2024-12-11 DIAGNOSIS — E11.9 TYPE 2 DIABETES MELLITUS WITHOUT COMPLICATION: ICD-10-CM

## 2025-01-06 DIAGNOSIS — E11.65 TYPE 2 DIABETES MELLITUS WITH HYPERGLYCEMIA, WITHOUT LONG-TERM CURRENT USE OF INSULIN: ICD-10-CM

## 2025-01-07 ENCOUNTER — TELEPHONE (OUTPATIENT)
Dept: FAMILY MEDICINE | Facility: CLINIC | Age: 65
End: 2025-01-07
Payer: MEDICARE

## 2025-01-07 RX ORDER — EMPAGLIFLOZIN AND METFORMIN HYDROCHLORIDE 12.5; 1 MG/1; MG/1
1 TABLET ORAL 2 TIMES DAILY
Qty: 60 TABLET | Refills: 2 | Status: SHIPPED | OUTPATIENT
Start: 2025-01-07

## 2025-01-07 NOTE — TELEPHONE ENCOUNTER
----- Message from Claire Ladd NP sent at 1/7/2025  3:20 PM CST -----  Regarding: appointment  Good afternoon,  Patient has not seen Dr. Hopkins since April I believe. He is OD for an A1C and an appointment. I am going to refill his diabetes medication for 90 days, no refills. Please call him to get scheduled for his follow up.     Thank you,  MM

## 2025-01-30 DIAGNOSIS — E11.65 TYPE 2 DIABETES MELLITUS WITH HYPERGLYCEMIA, WITHOUT LONG-TERM CURRENT USE OF INSULIN: Chronic | ICD-10-CM

## 2025-01-30 RX ORDER — REPAGLINIDE 1 MG/1
1 TABLET ORAL
Qty: 270 TABLET | Refills: 0 | Status: SHIPPED | OUTPATIENT
Start: 2025-01-30 | End: 2026-01-30

## 2025-02-18 ENCOUNTER — PATIENT MESSAGE (OUTPATIENT)
Dept: ADMINISTRATIVE | Facility: HOSPITAL | Age: 65
End: 2025-02-18
Payer: MEDICARE

## 2025-02-21 DIAGNOSIS — Z00.00 ENCOUNTER FOR MEDICARE ANNUAL WELLNESS EXAM: ICD-10-CM

## 2025-03-12 DIAGNOSIS — E11.9 TYPE 2 DIABETES MELLITUS WITHOUT COMPLICATION: ICD-10-CM

## 2025-03-26 PROCEDURE — 82570 ASSAY OF URINE CREATININE: CPT | Performed by: STUDENT IN AN ORGANIZED HEALTH CARE EDUCATION/TRAINING PROGRAM

## 2025-03-27 ENCOUNTER — OFFICE VISIT (OUTPATIENT)
Dept: INTERNAL MEDICINE | Facility: CLINIC | Age: 65
End: 2025-03-27
Payer: MEDICARE

## 2025-03-27 ENCOUNTER — RESULTS FOLLOW-UP (OUTPATIENT)
Dept: FAMILY MEDICINE | Facility: CLINIC | Age: 65
End: 2025-03-27

## 2025-03-27 VITALS
SYSTOLIC BLOOD PRESSURE: 130 MMHG | OXYGEN SATURATION: 98 % | HEART RATE: 67 BPM | BODY MASS INDEX: 26.12 KG/M2 | HEIGHT: 69 IN | DIASTOLIC BLOOD PRESSURE: 76 MMHG | WEIGHT: 176.38 LBS

## 2025-03-27 DIAGNOSIS — Z00.00 ENCOUNTER FOR MEDICARE ANNUAL WELLNESS EXAM: Primary | ICD-10-CM

## 2025-03-27 DIAGNOSIS — E78.5 HYPERLIPIDEMIA ASSOCIATED WITH TYPE 2 DIABETES MELLITUS: ICD-10-CM

## 2025-03-27 DIAGNOSIS — Z79.4 TYPE 2 DIABETES MELLITUS WITH BOTH EYES AFFECTED BY MODERATE NONPROLIFERATIVE RETINOPATHY AND MACULAR EDEMA, WITH LONG-TERM CURRENT USE OF INSULIN: ICD-10-CM

## 2025-03-27 DIAGNOSIS — D69.2 PURPURA: ICD-10-CM

## 2025-03-27 DIAGNOSIS — I70.0 AORTIC ATHEROSCLEROSIS: ICD-10-CM

## 2025-03-27 DIAGNOSIS — I20.89 OTHER FORMS OF ANGINA PECTORIS: ICD-10-CM

## 2025-03-27 DIAGNOSIS — E11.69 HYPERLIPIDEMIA ASSOCIATED WITH TYPE 2 DIABETES MELLITUS: ICD-10-CM

## 2025-03-27 DIAGNOSIS — E11.65 TYPE 2 DIABETES MELLITUS WITH HYPERGLYCEMIA, WITH LONG-TERM CURRENT USE OF INSULIN: Chronic | ICD-10-CM

## 2025-03-27 DIAGNOSIS — E11.3313 TYPE 2 DIABETES MELLITUS WITH BOTH EYES AFFECTED BY MODERATE NONPROLIFERATIVE RETINOPATHY AND MACULAR EDEMA, WITH LONG-TERM CURRENT USE OF INSULIN: ICD-10-CM

## 2025-03-27 DIAGNOSIS — I10 ESSENTIAL HYPERTENSION: ICD-10-CM

## 2025-03-27 DIAGNOSIS — Z79.4 TYPE 2 DIABETES MELLITUS WITH HYPERGLYCEMIA, WITH LONG-TERM CURRENT USE OF INSULIN: Chronic | ICD-10-CM

## 2025-03-27 DIAGNOSIS — I25.10 CORONARY ARTERY DISEASE INVOLVING NATIVE CORONARY ARTERY OF NATIVE HEART WITHOUT ANGINA PECTORIS: ICD-10-CM

## 2025-03-27 PROCEDURE — 99999 PR PBB SHADOW E&M-EST. PATIENT-LVL IV: CPT | Mod: PBBFAC,,, | Performed by: NURSE PRACTITIONER

## 2025-03-27 PROCEDURE — 99214 OFFICE O/P EST MOD 30 MIN: CPT | Mod: PBBFAC | Performed by: NURSE PRACTITIONER

## 2025-03-27 NOTE — PROGRESS NOTES
"  Darrell Hinds presented for an initial Medicare AWV today. The following components were reviewed and updated:    Medical history  Family History  Social history  Allergies and Current Medications  Health Risk Assessment  Health Maintenance  Care Team    **See Completed Assessments for Annual Wellness visit with in the encounter summary    The following assessments were completed:  Depression Screening  Cognitive function Screening    Timed Get Up Test  Whisper Test      Opioid documentation:      Patient does not have a current opioid prescription.          Vitals:    03/27/25 1459   BP: 130/76   BP Location: Right arm   Pulse: 67   SpO2: 98%   Weight: 80 kg (176 lb 5.9 oz)   Height: 5' 9" (1.753 m)     Body mass index is 26.05 kg/m².       Physical Exam  Constitutional:       Appearance: Normal appearance.   Cardiovascular:      Rate and Rhythm: Normal rate and regular rhythm.   Pulmonary:      Effort: Pulmonary effort is normal.      Breath sounds: Normal breath sounds.   Musculoskeletal:         General: Normal range of motion.   Skin:     General: Skin is warm and dry.   Neurological:      General: No focal deficit present.      Mental Status: He is alert.   Psychiatric:         Mood and Affect: Mood normal.           Diagnoses and health risks identified today and associated recommendations/orders:    1. Encounter for Medicare annual wellness exam  Here for Health Risk Assessment/Annual Wellness Visit.  Health maintenance reviewed and updated. Follow up in one year.   - Referral to Enhanced Annual Wellness Visit (eAWV) W+1    2. Essential hypertension  Chronic, at goal with losartan, metoprolol. Followed by PCP, Cardiology.    3. Aortic atherosclerosis  Chronic, stable on current medications. Followed by PCP, Cardiology.    4. Coronary artery disease involving native coronary artery of native heart without angina pectoris  Chronic, stable on current medications. No C/O CP/Dyspnea. Followed by PCP, " Cardiology.    5. Other forms of angina pectoris  Chronic, stable on current medications. No C/O CP/Dyspnea.Followed by PCP, Cardiology.    6. Hyperlipidemia associated with type 2 diabetes mellitus  Chronic, LDL not at goal. Followed by PCP, Cardiology.    7. Type 2 diabetes mellitus with both eyes affected by moderate nonproliferative retinopathy and macular edema, with long-term current use of insulin  Chronic, not at goal, trending downward. A1c 7.6, previous 8.5. Followed by PCP, Endocrinology, Optometry..    8. Type 2 diabetes mellitus with hyperglycemia, with long-term current use of insulin  Chronic, not at goal, trending downward. A1c 7.6, previous 8.5. Followed by PCP, Endocrinology.    9. Christian        Provided Darrell with a 5-10 year written screening schedule and personal prevention plan. Recommendations were developed using the USPSTF age appropriate recommendations. Education, counseling, and referrals were provided as needed.  After Visit Summary printed and given to patient which includes a list of additional screenings\tests needed.    Follow up in 4 months (on 7/15/2025).with PCP      Carol Almaraz NP

## 2025-03-27 NOTE — PATIENT INSTRUCTIONS
Counseling and Referral of Other Preventative  (Italic type indicates deductible and co-insurance are waived)    Patient Name: Darrell Hinds  Today's Date: 3/27/2025    Health Maintenance       Date Due Completion Date    Shingles Vaccine (1 of 2) Never done ---    RSV Vaccine (Age 60+ and Pregnant patients) (1 - Risk 60-74 years 1-dose series) Never done ---    Pneumococcal Vaccines (Age 50+) (3 of 3 - PCV20 or PCV21) 12/07/2023 12/7/2018    Influenza Vaccine (1) 09/01/2024 12/21/2019    COVID-19 Vaccine (1 - 2024-25 season) Never done ---    TETANUS VACCINE 02/09/2025 2/9/2015    Foot Exam 04/25/2025 4/25/2024 (Done)    Override on 4/25/2024: Done    Diabetic Eye Exam 07/15/2025 7/15/2024    Hemoglobin A1c 09/26/2025 3/26/2025    PROSTATE-SPECIFIC ANTIGEN 10/30/2025 10/30/2024    Aspirin/Antiplatelet Therapy 11/06/2025 11/6/2024    Diabetes Urine Screening 03/26/2026 3/26/2025    Lipid Panel 03/26/2026 3/26/2025    Colorectal Cancer Screening 02/04/2032 2/4/2022        No orders of the defined types were placed in this encounter.      The following information is provided to all patients.  This information is to help you find resources for any of the problems found today that may be affecting your health:                  Living healthy guide: www.[facility].[place].gov      Understanding Diabetes: www.diabetes.org      Eating healthy: www.cdc.gov/healthyweight      CDC home safety checklist: www.cdc.gov/steadi/patient.html      Agency on Aging: www.goea.[place].[facility]      Alcoholics anonymous (AA): www.aa.org      Physical Activity: www.petrona.nih.gov/mw5amva      Tobacco use: www.quitwithusla.org         Counseling and Referral of Other Preventative  (Italic type indicates deductible and co-insurance are waived)    Patient Name: Darrell Hinds  Today's Date: 3/27/2025    Health Maintenance       Date Due Completion Date    Shingles Vaccine (1 of 2) Never done ---    RSV Vaccine (Age 60+ and Pregnant patients) (1 -  Risk 60-74 years 1-dose series) Never done ---    Pneumococcal Vaccines (Age 50+) (3 of 3 - PCV20 or PCV21) 12/07/2023 12/7/2018    Influenza Vaccine (1) 09/01/2024 12/21/2019    COVID-19 Vaccine (1 - 2024-25 season) Never done ---    TETANUS VACCINE 02/09/2025 2/9/2015    Foot Exam 04/25/2025 4/25/2024 (Done)    Override on 4/25/2024: Done    Diabetic Eye Exam 07/15/2025 7/15/2024    Hemoglobin A1c 09/26/2025 3/26/2025    PROSTATE-SPECIFIC ANTIGEN 10/30/2025 10/30/2024    Aspirin/Antiplatelet Therapy 11/06/2025 11/6/2024    Diabetes Urine Screening 03/26/2026 3/26/2025    Lipid Panel 03/26/2026 3/26/2025    Colorectal Cancer Screening 02/04/2032 2/4/2022        No orders of the defined types were placed in this encounter.      The following information is provided to all patients.  This information is to help you find resources for any of the problems found today that may be affecting your health:                  Living healthy guide: www.[facility].[place].gov      Understanding Diabetes: www.diabetes.org      Eating healthy: www.cdc.gov/healthyweight      CDC home safety checklist: www.cdc.gov/steadi/patient.html      Agency on Aging: www.goea.[place].[facility]      Alcoholics anonymous (AA): www.aa.org      Physical Activity: www.petrona.nih.gov/jj6cdxk      Tobacco use: www.quitwithusla.org

## 2025-04-21 DIAGNOSIS — E11.65 TYPE 2 DIABETES MELLITUS WITH HYPERGLYCEMIA, WITHOUT LONG-TERM CURRENT USE OF INSULIN: Chronic | ICD-10-CM

## 2025-04-21 RX ORDER — INSULIN GLARGINE 100 [IU]/ML
20 INJECTION, SOLUTION SUBCUTANEOUS DAILY
Qty: 18 ML | Refills: 3 | Status: CANCELLED | OUTPATIENT
Start: 2025-04-21 | End: 2026-04-21

## 2025-04-21 RX ORDER — INSULIN DEGLUDEC 100 U/ML
20 INJECTION, SOLUTION SUBCUTANEOUS DAILY
Qty: 18 ML | Refills: 0 | Status: SHIPPED | OUTPATIENT
Start: 2025-04-21

## 2025-04-25 DIAGNOSIS — E11.69 HYPERLIPIDEMIA ASSOCIATED WITH TYPE 2 DIABETES MELLITUS: ICD-10-CM

## 2025-04-25 DIAGNOSIS — E78.5 HYPERLIPIDEMIA ASSOCIATED WITH TYPE 2 DIABETES MELLITUS: ICD-10-CM

## 2025-04-25 DIAGNOSIS — E11.65 TYPE 2 DIABETES MELLITUS WITH HYPERGLYCEMIA, WITHOUT LONG-TERM CURRENT USE OF INSULIN: Chronic | ICD-10-CM

## 2025-04-25 DIAGNOSIS — I10 ESSENTIAL HYPERTENSION: ICD-10-CM

## 2025-04-25 RX ORDER — EZETIMIBE 10 MG/1
10 TABLET ORAL
Qty: 90 TABLET | Refills: 1 | Status: SHIPPED | OUTPATIENT
Start: 2025-04-25

## 2025-04-25 NOTE — TELEPHONE ENCOUNTER
Last office visit 05/29/2024. Scheduled for 07/15/25.    Please see attached refill request. Let me know how I can be of any further assistance.     Thank you,     Kortney Cooper

## 2025-04-28 ENCOUNTER — OFFICE VISIT (OUTPATIENT)
Dept: UROLOGY | Facility: CLINIC | Age: 65
End: 2025-04-28
Payer: MEDICARE

## 2025-04-28 VITALS
WEIGHT: 175.06 LBS | HEART RATE: 77 BPM | OXYGEN SATURATION: 98 % | DIASTOLIC BLOOD PRESSURE: 83 MMHG | HEIGHT: 69 IN | BODY MASS INDEX: 25.93 KG/M2 | SYSTOLIC BLOOD PRESSURE: 148 MMHG

## 2025-04-28 DIAGNOSIS — E11.65 TYPE 2 DIABETES MELLITUS WITH HYPERGLYCEMIA, WITHOUT LONG-TERM CURRENT USE OF INSULIN: ICD-10-CM

## 2025-04-28 DIAGNOSIS — N20.0 BILATERAL NEPHROLITHIASIS: ICD-10-CM

## 2025-04-28 DIAGNOSIS — N40.0 ENLARGED PROSTATE: Primary | ICD-10-CM

## 2025-04-28 PROCEDURE — 99215 OFFICE O/P EST HI 40 MIN: CPT | Mod: PBBFAC,PO | Performed by: NURSE PRACTITIONER

## 2025-04-28 PROCEDURE — 99999 PR PBB SHADOW E&M-EST. PATIENT-LVL V: CPT | Mod: PBBFAC,,, | Performed by: NURSE PRACTITIONER

## 2025-04-28 PROCEDURE — 99214 OFFICE O/P EST MOD 30 MIN: CPT | Mod: S$PBB,,, | Performed by: NURSE PRACTITIONER

## 2025-04-28 RX ORDER — LOSARTAN POTASSIUM 50 MG/1
50 TABLET ORAL
Qty: 90 TABLET | Refills: 0 | Status: SHIPPED | OUTPATIENT
Start: 2025-04-28

## 2025-04-28 RX ORDER — METOPROLOL SUCCINATE 25 MG/1
25 TABLET, EXTENDED RELEASE ORAL
Qty: 90 TABLET | Refills: 0 | Status: SHIPPED | OUTPATIENT
Start: 2025-04-28

## 2025-04-28 NOTE — PATIENT INSTRUCTIONS
U/S retroperitoneal complete (next available) for evaluation of bilateral renal stones.   Continue taking dutasteride (Avodart) 0.5 mg mg daily for BPH.   Follow-up in 6 months for annual urology exam.

## 2025-04-28 NOTE — PROGRESS NOTES
Subjective:       Patient ID: Darrell Hinds Jr. is a 64 y.o. male.    Chief Complaint: Follow-up    History of Present Illness    CHIEF COMPLAINT:  Patient presents today for 6-month follow-up of prostate and kidney stones. Patient's wife is present during visit.     GENITOURINARY:  He reports occasional nocturia only after late dinners. Daytime urination is normal with good stream when adequately hydrated. He denies straining with urination.    GASTROINTESTINAL:  He experienced transient morning abdominal cramping which resolved spontaneously, possibly related to recent food intake.    MEDICATIONS:  He recently started Dutasteride 6 months ago and reports possible hair growth since initiation. He missed today's dose due to confusion about medication duration, but will resume today.      ROS:  General: -fever, -chills, -fatigue, -weight gain, -weight loss  Eyes: -vision changes, -redness, -discharge  ENT: -ear pain, -nasal congestion, -sore throat  Cardiovascular: -chest pain, -palpitations, -lower extremity edema  Respiratory: -cough, -shortness of breath  Gastrointestinal: -abdominal pain, -nausea, -vomiting, -diarrhea, -constipation, -blood in stool  Genitourinary: -dysuria, -hematuria, +frequency (drinks a lot of water), +nocturia (sometimes)  Musculoskeletal: -joint pain, -muscle pain  Skin: -rash, -lesion  Neurological: -headache, -dizziness, -numbness, -tingling  Psychiatric: -anxiety, -depression, -sleep difficulty           Objective:      Physical Exam  Vitals and nursing note reviewed.   Constitutional:       General: He is not in acute distress.     Appearance: He is well-developed. He is not ill-appearing.   HENT:      Head: Normocephalic and atraumatic.   Eyes:      Pupils: Pupils are equal, round, and reactive to light.   Cardiovascular:      Rate and Rhythm: Normal rate.   Pulmonary:      Effort: Pulmonary effort is normal. No respiratory distress.   Abdominal:      Palpations: Abdomen is soft.      " Tenderness: There is no abdominal tenderness.   Musculoskeletal:         General: Normal range of motion.      Cervical back: Normal range of motion.   Skin:     General: Skin is warm and dry.   Neurological:      Mental Status: He is alert and oriented to person, place, and time.      Coordination: Coordination normal.   Psychiatric:         Mood and Affect: Mood normal.         Behavior: Behavior normal.         Thought Content: Thought content normal.         Judgment: Judgment normal.         Assessment:       Problem List Items Addressed This Visit    None  Visit Diagnoses         Enlarged prostate    -  Primary      Bilateral nephrolithiasis        Relevant Orders    US Retroperitoneal Complete            Plan:           Darrell De Anda" was seen today for follow-up.    Diagnoses and all orders for this visit:    Enlarged prostate    Bilateral nephrolithiasis  -     US Retroperitoneal Complete; Future    Other order  Continue taking dutasteride (Avodart) 0.5 mg mg daily for BPH.     Follow-up in 6 months for annual urology exam.    This note was generated with the assistance of ambient listening technology. Verbal consent was obtained by the patient and accompanying visitor(s) for the recording of patient appointment to facilitate this note. I attest to having reviewed and edited the generated note for accuracy, though some syntax or spelling errors may persist. Please contact the author of this note for any clarification.      Fabian Rose DNP    "

## 2025-04-29 RX ORDER — EMPAGLIFLOZIN AND METFORMIN HYDROCHLORIDE 12.5; 1 MG/1; MG/1
1 TABLET ORAL 2 TIMES DAILY
Qty: 60 TABLET | Refills: 2 | Status: SHIPPED | OUTPATIENT
Start: 2025-04-29

## 2025-05-06 NOTE — PROGRESS NOTES
Subjective:      Patient ID: Darrell Hinds Jr. is a 64 y.o. male.    Chief Complaint:  Diabetes    History of Present Illness  Darrell Hinds Jr. is here for follow up of DM.  Previously seen by me 2024.  This is a MyChart video visit.    The patient location is: [place]  The chief complaint leading to consultation is: DM  Visit type: Virtual visit with synchronous audio and video  Total time spent with patient: see below  Each patient to whom he or she provides medical services by telemedicine is:  (1) informed of the relationship between the physician and patient and the respective role of any other health care provider with respect to management of the patient; and (2) notified that he or she may decline to receive medical services by telemedicine and may withdraw from such care at any time.    With regards to Diabetes:    Diagnosed: ~  DE: 2022  Known complications:  DKA Denies  RN Denies  Eye Exam: 2024  PN Denies  Podiatry: 2022  Nephropathy Denies  CAD +  Denies history of pancreatitis & personal/family history of medullary thyroid cancer.     Diet/Exercise:  Eats 2-3 meals a day.   Snacks : occasionally - fruit.   Drinks : coffee, water.  Exercise - tries to stay active.   Recent illness, injury, steroids: Denies    Current Regimen:  Synjardy 12.5-1000 -- 1 tablet twice daily   Tresiba 20 units daily   Prandin 1 mg three times daily before meals - forgetting to take L and D dose fairly often     Other medications tried:  Januvia - weight loss ~20lbs    Glucose Monitor: Dexcom G7   6 times a day testing  Log reviewed: sensor reviewed  Fastin-140  Reports PP hyperglycemia and takes ~ 3 hours to come back down.   Before bed ~ 200 but reports to eat before bed    Hypoglycemia:  Rare - suspects false lows overnight   Knows how to correct with 15 grams of carbs- juice, coke, or a peppermint.     Diabetes Management Status    Hemoglobin A1C   Date Value Ref Range Status   2024 8.5 (H) 4.0 -  5.6 % Final     Comment:     ADA Screening Guidelines:  5.7-6.4%  Consistent with prediabetes  >or=6.5%  Consistent with diabetes    High levels of fetal hemoglobin interfere with the HbA1C  assay. Heterozygous hemoglobin variants (HbS, HgC, etc)do  not significantly interfere with this assay.   However, presence of multiple variants may affect accuracy.     04/05/2024 8.0 (H) 4.0 - 5.6 % Final     Comment:     ADA Screening Guidelines:  5.7-6.4%  Consistent with prediabetes  >or=6.5%  Consistent with diabetes    High levels of fetal hemoglobin interfere with the HbA1C  assay. Heterozygous hemoglobin variants (HbS, HgC, etc)do  not significantly interfere with this assay.   However, presence of multiple variants may affect accuracy.     03/05/2024 8.1 (H) 4.0 - 5.6 % Final     Comment:     ADA Screening Guidelines:  5.7-6.4%  Consistent with prediabetes  >or=6.5%  Consistent with diabetes    High levels of fetal hemoglobin interfere with the HbA1C  assay. Heterozygous hemoglobin variants (HbS, HgC, etc)do  not significantly interfere with this assay.   However, presence of multiple variants may affect accuracy.       Hemoglobin A1c   Date Value Ref Range Status   03/26/2025 7.6 (H) 4.0 - 5.6 % Final     Comment:     ADA Screening Guidelines:  5.7-6.4%  Consistent with prediabetes  >=6.5%  Consistent with diabetes    High levels of fetal hemoglobin interfere with the HbA1C  assay. Heterozygous hemoglobin variants (HbS, HgC, etc)do  not significantly interfere with this assay.   However, presence of multiple variants may affect accuracy.       Statin: Not taking  ACE/ARB: Taking  Screening or Prevention Patient's value Goal Complete/Controlled?   HgA1C Testing and Control   Lab Results   Component Value Date    HGBA1C 7.6 (H) 03/26/2025      Annually/Less than 8% No   Lipid profile : 03/26/2025 Annually Yes   LDL control Lab Results   Component Value Date    LDLCALC 100.6 03/26/2025    Annually/Less than 100 mg/dl  No    Nephropathy screening Lab Results   Component Value Date    LABMICR 11.0 03/26/2025     Lab Results   Component Value Date    PROTEINUA Negative 10/23/2024    Annually Yes   Blood pressure BP Readings from Last 1 Encounters:   04/28/25 (!) 148/83    Less than 140/90 Yes   Dilated retinal exam : 07/15/2024 Annually Yes   Foot exam   : 04/25/2024 Annually Yes     Review of Systems  As above    There were no vitals taken for this visit.    There is no height or weight on file to calculate BMI.    Lab Review:   Lab Results   Component Value Date    HGBA1C 7.6 (H) 03/26/2025    HGBA1C 8.5 (H) 08/20/2024    HGBA1C 8.0 (H) 04/05/2024       Lab Results   Component Value Date    CHOL 164 03/26/2025    HDL 40 03/26/2025    LDLCALC 100.6 03/26/2025    TRIG 117 03/26/2025    CHOLHDL 24.4 03/26/2025     Lab Results   Component Value Date     10/23/2024    K 4.6 10/23/2024     10/23/2024    CO2 22 (L) 10/23/2024     (H) 10/23/2024    BUN 16 10/23/2024    CREATININE 1.0 10/23/2024    CALCIUM 9.0 10/23/2024    PROT 6.5 10/23/2024    ALBUMIN 3.9 10/23/2024    BILITOT 0.4 10/23/2024    ALKPHOS 64 10/23/2024    AST 15 10/23/2024    ALT 11 10/23/2024    ANIONGAP 9 10/23/2024    ESTGFRAFRICA >60.0 04/25/2022    EGFRNONAA >60.0 04/25/2022    TSH 1.280 03/05/2024     Vit D, 25-Hydroxy   Date Value Ref Range Status   07/22/2021 23 (L) 30 - 96 ng/mL Final     Comment:     Vitamin D deficiency.........<10 ng/mL                              Vitamin D insufficiency......10-29 ng/mL       Vitamin D sufficiency........> or equal to 30 ng/mL  Vitamin D toxicity............>100 ng/mL       Assessment and Plan     1. Type 2 diabetes mellitus with both eyes affected by moderate nonproliferative retinopathy and macular edema, with long-term current use of insulin  blood-glucose meter kit    lancets Misc    blood sugar diagnostic Strp    insulin glargine U-100, Lantus, (BASAGLAR KWIKPEN U-100 INSULIN) 100 unit/mL (3 mL) InPn pen     Comprehensive Metabolic Panel    Hemoglobin A1C    TSH      2. Coronary artery disease involving native coronary artery of native heart without angina pectoris            Type 2 diabetes mellitus with both eyes affected by moderate nonproliferative retinopathy and macular edema, with long-term current use of insulin  -- Labs prior to follow up.  -- A1c goal <7%.  -- Medications discussed:  MFM   GLP1-DPP4 - does not want to retry due to weight loss  OWENS   SGLT2   Insulin   -- Reviewed logs/CGM:  Significant PP hyperglycemia.  Instructed to send glucose logs in 14 days.  Reach out to me sooner for any glucose <70 or consistently >180.  -- Medication Changes:   Synjardy 12.5-1000 -- 1 tablet twice daily   Basaglar (patient stated Tresiba is more expensive) 20 units daily   Prandin 1 mg three times daily before meals   Encouraged compliance.   -- Reviewed goals of therapy are to get the best control we can without hypoglycemia.  -- Reviewed patient's current insulin regimen. Clarified proper insulin dose and timing in relation to meals, etc. Insulin injection sites and proper rotation instructed.    -- Advised frequent self blood glucose monitoring.  Patient encouraged to document glucose results and bring them to every clinic visit.  -- Hypoglycemia precautions discussed. Instructed on precautions before driving.    -- Call for Bg repeatedly < 90 or > 180.   -- Close adherence to lifestyle changes recommended.   -- Periodic follow ups for eye evaluations, foot care and dental care suggested.    Coronary artery disease involving native coronary artery of native heart without angina pectoris  -- Optimize glucose control.       Follow up in about 4 months (around 9/7/2025).    I spent 30 minutes face-to-face with the patient, over half of the visit was spent on counseling and/or coordinating the care of the patient.    Counseling includes:  Diagnostic results, impressions, recommendations   Prognosis   Risk and  benefits of management/treatment options   Instructions for management treatment and or follow-up   Importance of compliance with management   Risk factor reduction   Patient education    Visit today included increased complexity associated with the care of the problems addressed and managing the longitudinal care of the patient due to the serious and/or complex managed problems.

## 2025-05-07 ENCOUNTER — RESULTS FOLLOW-UP (OUTPATIENT)
Dept: UROLOGY | Facility: CLINIC | Age: 65
End: 2025-05-07

## 2025-05-07 ENCOUNTER — OFFICE VISIT (OUTPATIENT)
Dept: ENDOCRINOLOGY | Facility: CLINIC | Age: 65
End: 2025-05-07
Payer: MEDICARE

## 2025-05-07 DIAGNOSIS — Z79.4 TYPE 2 DIABETES MELLITUS WITH BOTH EYES AFFECTED BY MODERATE NONPROLIFERATIVE RETINOPATHY AND MACULAR EDEMA, WITH LONG-TERM CURRENT USE OF INSULIN: Primary | ICD-10-CM

## 2025-05-07 DIAGNOSIS — E11.3313 TYPE 2 DIABETES MELLITUS WITH BOTH EYES AFFECTED BY MODERATE NONPROLIFERATIVE RETINOPATHY AND MACULAR EDEMA, WITH LONG-TERM CURRENT USE OF INSULIN: ICD-10-CM

## 2025-05-07 DIAGNOSIS — Z79.4 TYPE 2 DIABETES MELLITUS WITH BOTH EYES AFFECTED BY MODERATE NONPROLIFERATIVE RETINOPATHY AND MACULAR EDEMA, WITH LONG-TERM CURRENT USE OF INSULIN: ICD-10-CM

## 2025-05-07 DIAGNOSIS — I25.10 CORONARY ARTERY DISEASE INVOLVING NATIVE CORONARY ARTERY OF NATIVE HEART WITHOUT ANGINA PECTORIS: ICD-10-CM

## 2025-05-07 DIAGNOSIS — E11.3313 TYPE 2 DIABETES MELLITUS WITH BOTH EYES AFFECTED BY MODERATE NONPROLIFERATIVE RETINOPATHY AND MACULAR EDEMA, WITH LONG-TERM CURRENT USE OF INSULIN: Primary | ICD-10-CM

## 2025-05-07 RX ORDER — BLOOD SUGAR DIAGNOSTIC
STRIP MISCELLANEOUS
Qty: 200 STRIP | Refills: 11 | Status: SHIPPED | OUTPATIENT
Start: 2025-05-07

## 2025-05-07 RX ORDER — LANCETS
EACH MISCELLANEOUS
Qty: 200 EACH | Refills: 11 | Status: SHIPPED | OUTPATIENT
Start: 2025-05-07 | End: 2025-05-07

## 2025-05-07 RX ORDER — INSULIN GLARGINE 100 [IU]/ML
20 INJECTION, SOLUTION SUBCUTANEOUS DAILY
Qty: 18 ML | Refills: 3 | Status: SHIPPED | OUTPATIENT
Start: 2025-05-07 | End: 2026-05-07

## 2025-05-07 RX ORDER — LANCETS
EACH MISCELLANEOUS
Qty: 200 EACH | Refills: 11 | Status: SHIPPED | OUTPATIENT
Start: 2025-05-07

## 2025-05-07 RX ORDER — INSULIN PUMP SYRINGE, 3 ML
EACH MISCELLANEOUS
Qty: 1 EACH | Refills: 0 | Status: SHIPPED | OUTPATIENT
Start: 2025-05-07 | End: 2025-05-07

## 2025-05-07 RX ORDER — BLOOD-GLUCOSE METER
EACH MISCELLANEOUS
Qty: 1 EACH | Refills: 0 | Status: SHIPPED | OUTPATIENT
Start: 2025-05-07

## 2025-05-07 NOTE — Clinical Note
Upload Dexcom VV in 3-4 months with labs prior Orders Placed This Encounter     Comprehensive Metabolic Panel     Hemoglobin A1C     TSH

## 2025-05-07 NOTE — ASSESSMENT & PLAN NOTE
-- Labs prior to follow up.  -- A1c goal <7%.  -- Medications discussed:  MFM   GLP1-DPP4 - does not want to retry due to weight loss  OWENS   SGLT2   Insulin   -- Reviewed logs/CGM:  Significant PP hyperglycemia.  Instructed to send glucose logs in 14 days.  Reach out to me sooner for any glucose <70 or consistently >180.  -- Medication Changes:   Synjardy 12.5-1000 -- 1 tablet twice daily   Basaglar (patient stated Tresiba is more expensive) 20 units daily   Prandin 1 mg three times daily before meals   Encouraged compliance.   -- Reviewed goals of therapy are to get the best control we can without hypoglycemia.  -- Reviewed patient's current insulin regimen. Clarified proper insulin dose and timing in relation to meals, etc. Insulin injection sites and proper rotation instructed.    -- Advised frequent self blood glucose monitoring.  Patient encouraged to document glucose results and bring them to every clinic visit.  -- Hypoglycemia precautions discussed. Instructed on precautions before driving.    -- Call for Bg repeatedly < 90 or > 180.   -- Close adherence to lifestyle changes recommended.   -- Periodic follow ups for eye evaluations, foot care and dental care suggested.

## 2025-05-21 ENCOUNTER — TELEPHONE (OUTPATIENT)
Dept: ENDOCRINOLOGY | Facility: CLINIC | Age: 65
End: 2025-05-21
Payer: MEDICARE

## 2025-05-23 ENCOUNTER — PATIENT MESSAGE (OUTPATIENT)
Dept: ENDOCRINOLOGY | Facility: CLINIC | Age: 65
End: 2025-05-23
Payer: MEDICARE

## 2025-07-14 DIAGNOSIS — E11.65 INADEQUATELY CONTROLLED DIABETES MELLITUS: ICD-10-CM

## 2025-07-14 DIAGNOSIS — E11.65 TYPE 2 DIABETES MELLITUS WITH HYPERGLYCEMIA, WITHOUT LONG-TERM CURRENT USE OF INSULIN: ICD-10-CM

## 2025-07-15 ENCOUNTER — OFFICE VISIT (OUTPATIENT)
Dept: FAMILY MEDICINE | Facility: CLINIC | Age: 65
End: 2025-07-15
Payer: MEDICARE

## 2025-07-15 VITALS
SYSTOLIC BLOOD PRESSURE: 120 MMHG | HEIGHT: 69 IN | TEMPERATURE: 98 F | DIASTOLIC BLOOD PRESSURE: 70 MMHG | WEIGHT: 176.56 LBS | OXYGEN SATURATION: 98 % | HEART RATE: 74 BPM | BODY MASS INDEX: 26.15 KG/M2

## 2025-07-15 DIAGNOSIS — Z23 NEED FOR PNEUMOCOCCAL VACCINATION: ICD-10-CM

## 2025-07-15 DIAGNOSIS — Z00.00 ANNUAL PHYSICAL EXAM: Primary | ICD-10-CM

## 2025-07-15 DIAGNOSIS — Z79.4 TYPE 2 DIABETES MELLITUS WITH HYPERGLYCEMIA, WITH LONG-TERM CURRENT USE OF INSULIN: ICD-10-CM

## 2025-07-15 DIAGNOSIS — Z12.5 PROSTATE CANCER SCREENING: ICD-10-CM

## 2025-07-15 DIAGNOSIS — E11.65 TYPE 2 DIABETES MELLITUS WITH HYPERGLYCEMIA, WITH LONG-TERM CURRENT USE OF INSULIN: ICD-10-CM

## 2025-07-15 DIAGNOSIS — E78.49 OTHER HYPERLIPIDEMIA: ICD-10-CM

## 2025-07-15 DIAGNOSIS — I10 ESSENTIAL HYPERTENSION: ICD-10-CM

## 2025-07-15 DIAGNOSIS — T46.6X5A STATIN MYOPATHY: ICD-10-CM

## 2025-07-15 DIAGNOSIS — G72.0 STATIN MYOPATHY: ICD-10-CM

## 2025-07-15 PROCEDURE — 99999 PR PBB SHADOW E&M-EST. PATIENT-LVL V: CPT | Mod: PBBFAC,,, | Performed by: STUDENT IN AN ORGANIZED HEALTH CARE EDUCATION/TRAINING PROGRAM

## 2025-07-15 PROCEDURE — 99215 OFFICE O/P EST HI 40 MIN: CPT | Mod: PBBFAC,PN,25 | Performed by: STUDENT IN AN ORGANIZED HEALTH CARE EDUCATION/TRAINING PROGRAM

## 2025-07-15 PROCEDURE — 90677 PCV20 VACCINE IM: CPT | Mod: PBBFAC,PN

## 2025-07-15 PROCEDURE — 99215 OFFICE O/P EST HI 40 MIN: CPT | Mod: S$PBB,,, | Performed by: STUDENT IN AN ORGANIZED HEALTH CARE EDUCATION/TRAINING PROGRAM

## 2025-07-15 PROCEDURE — G0009 ADMIN PNEUMOCOCCAL VACCINE: HCPCS | Mod: PBBFAC,PN

## 2025-07-15 PROCEDURE — 99999PBSHW PR PBB SHADOW TECHNICAL ONLY FILED TO HB: Mod: PBBFAC,,,

## 2025-07-15 RX ORDER — PEN NEEDLE, DIABETIC 31 GX5/16"
NEEDLE, DISPOSABLE MISCELLANEOUS
Qty: 100 EACH | Refills: 3 | Status: SHIPPED | OUTPATIENT
Start: 2025-07-15

## 2025-07-15 RX ORDER — BLOOD-GLUCOSE,RECEIVER,CONT
EACH MISCELLANEOUS
Qty: 1 EACH | Refills: 0 | Status: SHIPPED | OUTPATIENT
Start: 2025-07-15

## 2025-07-15 RX ORDER — BLOOD-GLUCOSE SENSOR
1 EACH MISCELLANEOUS
Qty: 3 EACH | Refills: 11 | Status: SHIPPED | OUTPATIENT
Start: 2025-10-10 | End: 2026-10-10

## 2025-07-15 RX ORDER — BLOOD-GLUCOSE SENSOR
EACH MISCELLANEOUS
Qty: 3 EACH | Refills: 11 | Status: SHIPPED | OUTPATIENT
Start: 2025-07-15

## 2025-07-15 RX ADMIN — PNEUMOCOCCAL 20-VALENT CONJUGATE VACCINE 0.5 ML
2.2; 2.2; 2.2; 2.2; 2.2; 2.2; 2.2; 2.2; 2.2; 2.2; 2.2; 2.2; 2.2; 2.2; 2.2; 2.2; 4.4; 2.2; 2.2; 2.2 INJECTION, SUSPENSION INTRAMUSCULAR at 04:07

## 2025-07-15 NOTE — PROGRESS NOTES
[facility] Note    Chief Complaint      Chief Complaint   Patient presents with    Annual Exam     History of Present Illness     Darrell Hinds Jr. is a 65 y.o. male with sHTN, T2DM, HLD and CAD s/p 3 stents who presents for f/u on chronic conditions. He endorses mild dull aching left sided neck pain especially upon waking up from sleep . He is compliant with all medications. He follows with endo team as well. He is active and now working on improving his diet. HE lost his mum yesterday and managing the grief as expected.     Problem List Addressed This Visit:  1. Annual physical exam  -     CBC Auto Differential; Standing  -     Comprehensive Metabolic Panel; Standing  -     Hemoglobin A1C; Standing  -     TSH; Standing  -     Lipid Panel; Standing    2. Prostate cancer screening  -     PSA, Screening; Future; Expected date: 07/15/2025    3. Essential hypertension  -     CBC Auto Differential; Standing  -     Comprehensive Metabolic Panel; Standing  -     TSH; Standing  -     Hypertension Digital Medicine (HDMP) Enrollment Order    4. Other hyperlipidemia  -     Lipid Panel; Standing    5. Type 2 diabetes mellitus with hyperglycemia, with long-term current use of insulin  -     Hemoglobin A1C; Standing  -     Diabetes Digital Medicine (DDMP) Enrollment Order  -     blood-glucose sensor (DEXCOM G7 SENSOR) Pattie; 1 each by Misc.(Non-Drug; Combo Route) route every 10 days.  Dispense: 3 each; Refill: 11  -     Ambulatory referral/consult to Ophthalmology; Future; Expected date: 07/22/2025  -     Ambulatory referral/consult to Podiatry; Future; Expected date: 07/22/2025    6. Need for pneumococcal vaccination  -     pneumoc 20-radha conj-dip cr(PF) (PREVNAR-20 (PF)) injection Syrg 0.5 mL    7. Statin myopathy         Health Maintenance   Topic Date Due    Shingles Vaccine (1 of 2) Never done    RSV Vaccine (Age 60+ and Pregnant patients) (1 - Risk 60-74 years 1-dose series) Never done    COVID-19  Vaccine (1 - 2024-25 season) Never done    TETANUS VACCINE  02/09/2025    Foot Exam  04/25/2025    Diabetic Eye Exam  07/15/2025    Influenza Vaccine (1) 09/01/2025    Hemoglobin A1c  09/26/2025    PROSTATE-SPECIFIC ANTIGEN  10/30/2025    Diabetes Urine Screening  03/26/2026    Lipid Panel  03/26/2026    Aspirin/Antiplatelet Therapy  07/15/2026    Colorectal Cancer Screening  02/04/2032    Hepatitis C Screening  Completed    Pneumococcal Vaccines (Age 50+)  Completed    Abdominal Aortic Aneurysm Screening  Completed       Past Medical History:   Diagnosis Date    Cataract     Diabetes mellitus, type 2     Diabetic retinopathy     Encounter for cosmetic surgery     GERD (gastroesophageal reflux disease)     Hyperlipidemia     Hypertension     Hypertrophy of nasal turbinates     Kidney stone 2009    Nasal obstruction     Nasal septal deformity     Pneumonia due to COVID-19 virus 1/8/2022    Unexplained weight loss 9/22/2021    Unspecified disorder of kidney and ureter        Past Surgical History:   Procedure Laterality Date    COLONOSCOPY N/A 2/4/2022    Procedure: COLONOSCOPY Suprep covid test 9/5/21;  Surgeon: Miles Amador MD;  Location: [facility];  Service: Endoscopy;  Laterality: N/A;    COSMETIC SURGERY      Rhinoplasty    ESOPHAGOGASTRODUODENOSCOPY N/A 2/4/2022    Procedure: EGD (ESOPHAGOGASTRODUODENOSCOPY);  Surgeon: Miles Amador MD;  Location: [facility];  Service: Endoscopy;  Laterality: N/A;    EXCISION TURBINATE, SUBMUCOUS      HERNIA REPAIR      Umbilical    LEFT HEART CATHETERIZATION N/A 10/5/2021    Procedure: Left heart cath;  Surgeon: Lawson Faulkner MD;  Location: [facility] CATH LAB;  Service: Cardiology;  Laterality: N/A;    NASAL SEPTUM SURGERY         family history includes Cancer in his daughter; Cataracts in his father and mother; Diabetes in his brother and father; Heart disease in his father, paternal grandfather, and paternal grandmother; Heart failure in his father; Hyperlipidemia in his  father; Hypertension in his brother and mother; Kidney disease in his maternal grandmother; No Known Problems in his maternal aunt, maternal grandfather, maternal uncle, paternal aunt, paternal uncle, and sister; Stroke in his father.    Social History     Tobacco Use    Smoking status: Never    Smokeless tobacco: Never   Substance Use Topics    Alcohol use: Yes     Alcohol/week: 1.0 standard drink of alcohol     Types: 1 Cans of beer per week     Comment: seldom..maybe once a month    Drug use: No       Review of Systems   Constitutional:  Negative for chills, fatigue and fever.   Respiratory:  Negative for cough and shortness of breath.    Cardiovascular:  Negative for chest pain, palpitations and leg swelling.   Genitourinary:  Negative for dysuria, flank pain and frequency.   Musculoskeletal:  Negative for gait problem and joint swelling.       Outpatient Encounter Medications as of 7/15/2025   Medication Sig Dispense Refill    antiox #8/om3/dha/epa/lut/zeax (PRESERVISION AREDS 2, OMEGA-3, ORAL) Take by mouth Daily.      aspirin (ECOTRIN) 81 MG EC tablet Take 81 mg by mouth once daily.      blood-glucose sensor (DEXCOM G7 SENSOR) Pattie Change every 10 days. 3 each 11    blood-glucose,,cont (DEXCOM G7 ) Misc Use as directed. 1 each 0    dutasteride (AVODART) 0.5 mg capsule Take 1 capsule (0.5 mg total) by mouth once daily. 30 capsule 11    ezetimibe (ZETIA) 10 mg tablet TAKE ONE TABLET BY MOUTH EVERY DAY 90 tablet 1    flash glucose sensor (FREESTYLE MICHELLE 2 SENSOR) Kit Change every 14 days. 6 kit 3    insulin glargine U-100, Lantus, (BASAGLAR KWIKPEN U-100 INSULIN) 100 unit/mL (3 mL) InPn pen Inject 20 Units into the skin once daily. 18 mL 3    losartan (COZAAR) 50 MG tablet TAKE 1 TABLET BY MOUTH EVERY DAY 90 tablet 0    metoprolol succinate (TOPROL-XL) 25 MG 24 hr tablet TAKE 1 TABLET BY MOUTH EVERY DAY 90 tablet 0    repaglinide (PRANDIN) 1 MG tablet TAKE 1 TABLET (1 MG TOTAL) BY MOUTH 3  "(THREE) TIMES DAILY BEFORE MEALS. 270 tablet 0    SYNJARDY 12.5-1,000 mg Tab TAKE 1 TABLET BY MOUTH TWICE A DAY 60 tablet 2    ACCU-CHEK GUIDE GLUCOSE METER Mis CHECK BLOOD SUGAR TWICE A DAY 1 each 0    ACCU-CHEK GUIDE TEST STRIPS Strp CHECK BLOOD SUGAR TWICE A  strip 11    BD ULTRA-FINE NAT PEN NEEDLE 32 gauge x 5/32" Ndle ONCE A  each 3    [START ON 10/10/2025] blood-glucose sensor (DEXCOM G7 SENSOR) Pattie 1 each by Misc.(Non-Drug; Combo Route) route every 10 days. 3 each 11    lancets (ACCU-CHEK SOFTCLIX LANCETS) Misc CHECK BLOOD SUGAR TWICE A  each 11    [DISCONTINUED] BD ULTRA-FINE NAT PEN NEEDLE 32 gauge x 5/32" Ndle ONCE A  each 3    [DISCONTINUED] blood-glucose meter,continuous (DEXCOM G7 ) Misc Use as directed. 1 each 0    [DISCONTINUED] blood-glucose sensor (DEXCOM G7 SENSOR) Pattie Change every 10 days. 3 each 11     Facility-Administered Encounter Medications as of 7/15/2025   Medication Dose Route Frequency Provider Last Rate Last Admin    [COMPLETED] pneumoc 20-radha conj-dip cr(PF) (PREVNAR-20 (PF)) injection Syrg 0.5 mL  0.5 mL Intramuscular 1 time in Clinic/HOD    0.5 mL at 07/15/25 1626        Review of patient's allergies indicates:  No Known Allergies    Physical Exam      Vital Signs  Temp: 97.7 °F (36.5 °C)  Temp Source: Temporal  Pulse: 74  SpO2: 98 %  BP: 120/70  BP Location: Right arm  Patient Position: Sitting  Pain Score: 0-No pain  Height and Weight  Height: 5' 9" (175.3 cm)  Weight: 80.1 kg (176 lb 9.4 oz)  BSA (Calculated - sq m): 1.97 sq meters  BMI (Calculated): 26.1  Weight in (lb) to have BMI = 25: 168.9]    Physical Exam  Constitutional:       Appearance: He is obese.   HENT:      Head: Normocephalic and atraumatic.      Mouth/Throat:      Mouth: Mucous membranes are moist.      Pharynx: Oropharynx is clear.   Eyes:      Extraocular Movements: Extraocular movements intact.      Pupils: Pupils are equal, round, and reactive to light. " "  Cardiovascular:      Rate and Rhythm: Normal rate and regular rhythm.      Pulses: Normal pulses.      Heart sounds: Normal heart sounds.   Pulmonary:      Effort: Pulmonary effort is normal.      Breath sounds: Normal breath sounds.   Abdominal:      General: There is no distension.      Palpations: Abdomen is soft. There is no mass.   Musculoskeletal:      Right lower leg: No edema.      Left lower leg: No edema.   Lymphadenopathy:      Cervical: No cervical adenopathy.   Skin:     General: Skin is warm.   Neurological:      General: No focal deficit present.      Mental Status: He is alert.   Psychiatric:         Mood and Affect: Mood normal.          Laboratory:  CBC:  No results for input(s): "WBC", "RBC", "HGB", "HCT", "PLT", "MCV", "MCH", "MCHC" in the last 2160 hours.  CMP:  No results for input(s): "GLU", "CALCIUM", "ALBUMIN", "PROT", "NA", "K", "CO2", "CL", "BUN", "ALKPHOS", "ALT", "AST", "BILITOT" in the last 2160 hours.    Invalid input(s): "CREATININ"    URINALYSIS:  No results for input(s): "COLORU", "CLARITYU", "SPECGRAV", "PHUR", "PROTEINUA", "GLUCOSEU", "BILIRUBINCON", "BLOODU", "WBCU", "RBCU", "BACTERIA", "MUCUS", "NITRITE", "LEUKOCYTESUR", "UROBILINOGEN", "HYALINECASTS" in the last 2160 hours.   LIPIDS:  No results for input(s): "TSH", "HDL", "CHOL", "TRIG", "LDLCALC", "CHOLHDL", "NONHDLCHOL", "TOTALCHOLEST" in the last 2160 hours.    TSH:  No results for input(s): "TSH" in the last 2160 hours.    A1C:  No results for input(s): "HGBA1C" in the last 2160 hours.      Radiology:      Assessment/Plan     Darrell Hinds Jr. is a 65 y.o.male with:    1. Annual physical exam  -     CBC Auto Differential; Standing  -     Comprehensive Metabolic Panel; Standing  -     Hemoglobin A1C; Standing  -     TSH; Standing  -     Lipid Panel; Standing    2. Prostate cancer screening  -     PSA, Screening; Future; Expected date: 07/15/2025    3. Essential hypertension  -     CBC Auto Differential; Standing  -     " Comprehensive Metabolic Panel; Standing  -     TSH; Standing  -     Hypertension Digital Medicine (HDMP) Enrollment Order    4. Other hyperlipidemia  -     Lipid Panel; Standing    5. Type 2 diabetes mellitus with hyperglycemia, with long-term current use of insulin  -     Hemoglobin A1C; Standing  -     Diabetes Digital Medicine (DDMP) Enrollment Order  -     blood-glucose sensor (DEXCOM G7 SENSOR) Pattie; 1 each by Misc.(Non-Drug; Combo Route) route every 10 days.  Dispense: 3 each; Refill: 11  -     Ambulatory referral/consult to Ophthalmology; Future; Expected date: 07/22/2025  -     Ambulatory referral/consult to Podiatry; Future; Expected date: 07/22/2025    6. Need for pneumococcal vaccination  -     pneumoc 20-radha conj-dip cr(PF) (PREVNAR-20 (PF)) injection Syrg 0.5 mL    7. Statin myopathy      6. Coronary artery disease involving native coronary artery of native heart without angina pectoris    Plan :  -labs due ordered   -vaccines due : tetanus, shingles and PCV 20  -preventive counseling provided  -due for annual eye exam, referral placed  -pending CGM authorization ( DEXCOM)  -med compliance reinforced   -c/w current regimen  -follows with endo ( Imelda Paz as well)  -c/w life style modifications      -Continue current medications and maintain follow up with specialists.      Patient verbalizes understanding and agrees with current treatment plan.    42 minutes of total time spent on the encounter, which includes face to face time and non-face to face time preparing to see the patient (eg, review of tests), Obtaining and/or reviewing separately obtained history, Documenting clinical information in the electronic or other health record, Independently interpreting results (not separately reported) and communicating results to the patient/family/caregiver, or Care coordination (not separately reported).      Kathy Stewart MD  Internal Medicine  [facility]

## 2025-08-06 ENCOUNTER — RESULTS FOLLOW-UP (OUTPATIENT)
Dept: ENDOCRINOLOGY | Facility: CLINIC | Age: 65
End: 2025-08-06
Payer: MEDICARE

## 2025-08-06 DIAGNOSIS — E11.65 TYPE 2 DIABETES MELLITUS WITH HYPERGLYCEMIA, WITHOUT LONG-TERM CURRENT USE OF INSULIN: Primary | ICD-10-CM

## 2025-08-12 ENCOUNTER — OFFICE VISIT (OUTPATIENT)
Dept: ENDOCRINOLOGY | Facility: CLINIC | Age: 65
End: 2025-08-12
Payer: MEDICARE

## 2025-08-12 ENCOUNTER — PATIENT MESSAGE (OUTPATIENT)
Dept: ENDOCRINOLOGY | Facility: CLINIC | Age: 65
End: 2025-08-12

## 2025-08-12 DIAGNOSIS — Z79.4 TYPE 2 DIABETES MELLITUS WITH BOTH EYES AFFECTED BY MODERATE NONPROLIFERATIVE RETINOPATHY AND MACULAR EDEMA, WITH LONG-TERM CURRENT USE OF INSULIN: Primary | ICD-10-CM

## 2025-08-12 DIAGNOSIS — E11.3313 TYPE 2 DIABETES MELLITUS WITH BOTH EYES AFFECTED BY MODERATE NONPROLIFERATIVE RETINOPATHY AND MACULAR EDEMA, WITH LONG-TERM CURRENT USE OF INSULIN: ICD-10-CM

## 2025-08-12 DIAGNOSIS — Z79.4 TYPE 2 DIABETES MELLITUS WITH BOTH EYES AFFECTED BY MODERATE NONPROLIFERATIVE RETINOPATHY AND MACULAR EDEMA, WITH LONG-TERM CURRENT USE OF INSULIN: ICD-10-CM

## 2025-08-12 DIAGNOSIS — E11.3313 TYPE 2 DIABETES MELLITUS WITH BOTH EYES AFFECTED BY MODERATE NONPROLIFERATIVE RETINOPATHY AND MACULAR EDEMA, WITH LONG-TERM CURRENT USE OF INSULIN: Primary | ICD-10-CM

## 2025-08-12 PROCEDURE — 95251 CONT GLUC MNTR ANALYSIS I&R: CPT | Mod: NDTC,,, | Performed by: NURSE PRACTITIONER

## 2025-08-12 PROCEDURE — G2211 COMPLEX E/M VISIT ADD ON: HCPCS | Mod: 95,,, | Performed by: NURSE PRACTITIONER

## 2025-08-12 PROCEDURE — 98006 SYNCH AUDIO-VIDEO EST MOD 30: CPT | Mod: 95,,, | Performed by: NURSE PRACTITIONER

## 2025-08-12 RX ORDER — INSULIN GLARGINE 100 [IU]/ML
26 INJECTION, SOLUTION SUBCUTANEOUS DAILY
Qty: 25 ML | Refills: 3 | Status: SHIPPED | OUTPATIENT
Start: 2025-08-12 | End: 2025-08-12

## 2025-08-12 RX ORDER — INSULIN GLARGINE 100 [IU]/ML
26 INJECTION, SOLUTION SUBCUTANEOUS DAILY
Qty: 15 EACH | Refills: 3 | Status: SHIPPED | OUTPATIENT
Start: 2025-08-12 | End: 2026-08-12

## 2025-08-12 RX ORDER — REPAGLINIDE 2 MG/1
2 TABLET ORAL
Qty: 270 TABLET | Refills: 3 | Status: SHIPPED | OUTPATIENT
Start: 2025-08-12 | End: 2026-08-12

## 2025-08-17 DIAGNOSIS — E11.65 TYPE 2 DIABETES MELLITUS WITH HYPERGLYCEMIA, WITHOUT LONG-TERM CURRENT USE OF INSULIN: ICD-10-CM

## 2025-08-17 DIAGNOSIS — I10 ESSENTIAL HYPERTENSION: ICD-10-CM

## 2025-08-17 RX ORDER — LOSARTAN POTASSIUM 50 MG/1
50 TABLET ORAL
Qty: 90 TABLET | Refills: 3 | Status: SHIPPED | OUTPATIENT
Start: 2025-08-17

## 2025-08-17 RX ORDER — EMPAGLIFLOZIN AND METFORMIN HYDROCHLORIDE 12.5; 1 MG/1; MG/1
1 TABLET ORAL 2 TIMES DAILY
Qty: 180 TABLET | Refills: 1 | Status: SHIPPED | OUTPATIENT
Start: 2025-08-17

## 2025-08-17 RX ORDER — METOPROLOL SUCCINATE 25 MG/1
25 TABLET, EXTENDED RELEASE ORAL
Qty: 90 TABLET | Refills: 3 | Status: SHIPPED | OUTPATIENT
Start: 2025-08-17

## 2025-08-18 ENCOUNTER — TELEPHONE (OUTPATIENT)
Dept: UROLOGY | Facility: CLINIC | Age: 65
End: 2025-08-18
Payer: MEDICARE

## 2025-08-18 DIAGNOSIS — N40.0 ENLARGED PROSTATE: ICD-10-CM

## 2025-08-18 RX ORDER — DUTASTERIDE 0.5 MG/1
0.5 CAPSULE, LIQUID FILLED ORAL DAILY
Qty: 90 CAPSULE | Refills: 2 | Status: SHIPPED | OUTPATIENT
Start: 2025-08-18 | End: 2026-05-15

## 2025-08-19 DIAGNOSIS — E11.69 HYPERLIPIDEMIA ASSOCIATED WITH TYPE 2 DIABETES MELLITUS: ICD-10-CM

## 2025-08-19 DIAGNOSIS — E78.5 HYPERLIPIDEMIA ASSOCIATED WITH TYPE 2 DIABETES MELLITUS: ICD-10-CM

## 2025-08-19 RX ORDER — EZETIMIBE 10 MG/1
10 TABLET ORAL
Qty: 90 TABLET | Refills: 1 | Status: SHIPPED | OUTPATIENT
Start: 2025-08-19

## 2025-08-19 RX ORDER — INSULIN GLARGINE 300 [IU]/ML
26 INJECTION, SOLUTION SUBCUTANEOUS DAILY
Qty: 9 ML | Refills: 3 | Status: SHIPPED | OUTPATIENT
Start: 2025-08-19 | End: 2026-08-19

## (undated) DEVICE — GUIDEWIRE AMPLATZ .035X260

## (undated) DEVICE — SHEATH INTRODUCER 6FR 11CM

## (undated) DEVICE — OMNIPAQUE 350MG 150ML VIAL

## (undated) DEVICE — SEE MEDLINE ITEM 156894

## (undated) DEVICE — CATH NC QUANTUM APEX MR 3.5X12

## (undated) DEVICE — GUIDE WIRE WHOLLY FLOPPY

## (undated) DEVICE — PAD DEFIB CADENCE ADULT R2

## (undated) DEVICE — SHEATH INTRODUCER 4FR 11CM

## (undated) DEVICE — KIT CO-PILOT

## (undated) DEVICE — KIT MICROINTRO 4F .018X40X7CM

## (undated) DEVICE — INFLATOR ENCORE 26 BLLN INFL

## (undated) DEVICE — GUIDE LAUNCHER 6FR EBU 3.5

## (undated) DEVICE — CATH INFINITI JUDKINS JR4

## (undated) DEVICE — CATH IMPULSE 5F 100CM FR4

## (undated) DEVICE — PROTECTION STATION PLUS

## (undated) DEVICE — CATH BLLN FG APEX MR 2.50X12MM

## (undated) DEVICE — CATH EAGLE EYE PLATINUM

## (undated) DEVICE — KIT CUSTOM MANIFOLD

## (undated) DEVICE — KIT GLIDESHEATH SLEND 6FR 10CM

## (undated) DEVICE — SPIKE CONTRAST CONTROLLER

## (undated) DEVICE — SEE MEDLINE ITEM 157187

## (undated) DEVICE — GUIDE WIRE BMW 014 X190

## (undated) DEVICE — HEMOSTAT VASC BAND REG 24CM

## (undated) DEVICE — CATH FL 3.5 5FR

## (undated) DEVICE — WIRE GUIDE SAFE-T-J .035 260CM